# Patient Record
Sex: FEMALE | Race: WHITE | NOT HISPANIC OR LATINO | Employment: UNEMPLOYED | URBAN - NONMETROPOLITAN AREA
[De-identification: names, ages, dates, MRNs, and addresses within clinical notes are randomized per-mention and may not be internally consistent; named-entity substitution may affect disease eponyms.]

---

## 2019-02-13 ENCOUNTER — APPOINTMENT (OUTPATIENT)
Dept: CT IMAGING | Facility: HOSPITAL | Age: 49
End: 2019-02-13

## 2019-02-13 ENCOUNTER — HOSPITAL ENCOUNTER (EMERGENCY)
Facility: HOSPITAL | Age: 49
Discharge: HOME | End: 2019-02-13
Attending: EMERGENCY MEDICINE | Admitting: EMERGENCY MEDICINE

## 2019-02-13 VITALS
DIASTOLIC BLOOD PRESSURE: 84 MMHG | SYSTOLIC BLOOD PRESSURE: 176 MMHG | OXYGEN SATURATION: 95 % | TEMPERATURE: 98.1 F | HEART RATE: 109 BPM | WEIGHT: 120 LBS | RESPIRATION RATE: 18 BRPM | BODY MASS INDEX: 21.26 KG/M2 | HEIGHT: 63 IN

## 2019-02-13 DIAGNOSIS — N39.0 ACUTE UTI: Primary | ICD-10-CM

## 2019-02-13 LAB
6-ACETYL MORPHINE: NEGATIVE
ALBUMIN SERPL-MCNC: 4.2 G/DL (ref 3.5–5)
ALBUMIN/GLOB SERPL: 1.3 G/DL (ref 1.5–2.5)
ALP SERPL-CCNC: 79 U/L (ref 35–104)
ALT SERPL W P-5'-P-CCNC: 127 U/L (ref 10–36)
AMPHET+METHAMPHET UR QL: NEGATIVE
ANION GAP SERPL CALCULATED.3IONS-SCNC: 12.5 MMOL/L (ref 3.6–11.2)
AST SERPL-CCNC: 118 U/L (ref 10–30)
BACTERIA UR QL AUTO: ABNORMAL /HPF
BARBITURATES UR QL SCN: NEGATIVE
BASOPHILS # BLD AUTO: 0.04 10*3/MM3 (ref 0–0.3)
BASOPHILS NFR BLD AUTO: 0.3 % (ref 0–2)
BENZODIAZ UR QL SCN: NEGATIVE
BILIRUB SERPL-MCNC: 1.1 MG/DL (ref 0.2–1.8)
BILIRUB UR QL STRIP: NEGATIVE
BUN BLD-MCNC: 12 MG/DL (ref 7–21)
BUN/CREAT SERPL: 21.1 (ref 7–25)
BUPRENORPHINE SERPL-MCNC: NEGATIVE NG/ML
CALCIUM SPEC-SCNC: 8.9 MG/DL (ref 7.7–10)
CANNABINOIDS SERPL QL: NEGATIVE
CHLORIDE SERPL-SCNC: 106 MMOL/L (ref 99–112)
CLARITY UR: ABNORMAL
CO2 SERPL-SCNC: 19.5 MMOL/L (ref 24.3–31.9)
COCAINE UR QL: NEGATIVE
COLOR UR: ABNORMAL
CREAT BLD-MCNC: 0.57 MG/DL (ref 0.43–1.29)
DEPRECATED RDW RBC AUTO: 45.8 FL (ref 37–54)
EOSINOPHIL # BLD AUTO: 0.13 10*3/MM3 (ref 0–0.7)
EOSINOPHIL NFR BLD AUTO: 1 % (ref 0–5)
ERYTHROCYTE [DISTWIDTH] IN BLOOD BY AUTOMATED COUNT: 13.1 % (ref 11.5–14.5)
ETHANOL BLD-MCNC: 42 MG/DL
ETHANOL UR QL: 0.04 %
GFR SERPL CREATININE-BSD FRML MDRD: 113 ML/MIN/1.73
GLOBULIN UR ELPH-MCNC: 3.3 GM/DL
GLUCOSE BLD-MCNC: 99 MG/DL (ref 70–110)
GLUCOSE UR STRIP-MCNC: NEGATIVE MG/DL
HCT VFR BLD AUTO: 43.2 % (ref 37–47)
HGB BLD-MCNC: 14.7 G/DL (ref 12–16)
HGB UR QL STRIP.AUTO: ABNORMAL
HYALINE CASTS UR QL AUTO: ABNORMAL /LPF
IMM GRANULOCYTES # BLD AUTO: 0.04 10*3/MM3 (ref 0–0.03)
IMM GRANULOCYTES NFR BLD AUTO: 0.3 % (ref 0–0.5)
KETONES UR QL STRIP: NEGATIVE
LEUKOCYTE ESTERASE UR QL STRIP.AUTO: ABNORMAL
LYMPHOCYTES # BLD AUTO: 2.4 10*3/MM3 (ref 1–3)
LYMPHOCYTES NFR BLD AUTO: 18.5 % (ref 21–51)
MAGNESIUM SERPL-MCNC: 2 MG/DL (ref 1.7–2.6)
MCH RBC QN AUTO: 32.5 PG (ref 27–33)
MCHC RBC AUTO-ENTMCNC: 34 G/DL (ref 33–37)
MCV RBC AUTO: 95.6 FL (ref 80–94)
METHADONE UR QL SCN: NEGATIVE
MONOCYTES # BLD AUTO: 1.47 10*3/MM3 (ref 0.1–0.9)
MONOCYTES NFR BLD AUTO: 11.4 % (ref 0–10)
NEUTROPHILS # BLD AUTO: 8.86 10*3/MM3 (ref 1.4–6.5)
NEUTROPHILS NFR BLD AUTO: 68.5 % (ref 30–70)
NITRITE UR QL STRIP: NEGATIVE
OPIATES UR QL: NEGATIVE
OSMOLALITY SERPL CALC.SUM OF ELEC: 275.5 MOSM/KG (ref 273–305)
OXYCODONE UR QL SCN: NEGATIVE
PCP UR QL SCN: NEGATIVE
PH UR STRIP.AUTO: 7 [PH] (ref 5–8)
PLATELET # BLD AUTO: 195 10*3/MM3 (ref 130–400)
PMV BLD AUTO: 11.5 FL (ref 6–10)
POTASSIUM BLD-SCNC: 3.3 MMOL/L (ref 3.5–5.3)
PROT SERPL-MCNC: 7.5 G/DL (ref 6–8)
PROT UR QL STRIP: NEGATIVE
RBC # BLD AUTO: 4.52 10*6/MM3 (ref 4.2–5.4)
RBC # UR: ABNORMAL /HPF
REF LAB TEST METHOD: ABNORMAL
SODIUM BLD-SCNC: 138 MMOL/L (ref 135–153)
SP GR UR STRIP: 1.02 (ref 1–1.03)
SQUAMOUS #/AREA URNS HPF: ABNORMAL /HPF
UROBILINOGEN UR QL STRIP: ABNORMAL
WBC NRBC COR # BLD: 12.94 10*3/MM3 (ref 4.5–12.5)
WBC UR QL AUTO: ABNORMAL /HPF
YEAST URNS QL MICRO: ABNORMAL /HPF

## 2019-02-13 PROCEDURE — 87086 URINE CULTURE/COLONY COUNT: CPT | Performed by: EMERGENCY MEDICINE

## 2019-02-13 PROCEDURE — 85025 COMPLETE CBC W/AUTO DIFF WBC: CPT | Performed by: EMERGENCY MEDICINE

## 2019-02-13 PROCEDURE — 99284 EMERGENCY DEPT VISIT MOD MDM: CPT

## 2019-02-13 PROCEDURE — 80307 DRUG TEST PRSMV CHEM ANLYZR: CPT | Performed by: EMERGENCY MEDICINE

## 2019-02-13 PROCEDURE — 87077 CULTURE AEROBIC IDENTIFY: CPT | Performed by: EMERGENCY MEDICINE

## 2019-02-13 PROCEDURE — 93010 ELECTROCARDIOGRAM REPORT: CPT | Performed by: INTERNAL MEDICINE

## 2019-02-13 PROCEDURE — 25010000002 CEFTRIAXONE PER 250 MG: Performed by: EMERGENCY MEDICINE

## 2019-02-13 PROCEDURE — 87186 SC STD MICRODIL/AGAR DIL: CPT | Performed by: EMERGENCY MEDICINE

## 2019-02-13 PROCEDURE — 70450 CT HEAD/BRAIN W/O DYE: CPT | Performed by: RADIOLOGY

## 2019-02-13 PROCEDURE — 96372 THER/PROPH/DIAG INJ SC/IM: CPT

## 2019-02-13 PROCEDURE — 80053 COMPREHEN METABOLIC PANEL: CPT | Performed by: EMERGENCY MEDICINE

## 2019-02-13 PROCEDURE — 93005 ELECTROCARDIOGRAM TRACING: CPT | Performed by: EMERGENCY MEDICINE

## 2019-02-13 PROCEDURE — 70450 CT HEAD/BRAIN W/O DYE: CPT

## 2019-02-13 PROCEDURE — 81001 URINALYSIS AUTO W/SCOPE: CPT | Performed by: EMERGENCY MEDICINE

## 2019-02-13 PROCEDURE — 83735 ASSAY OF MAGNESIUM: CPT | Performed by: EMERGENCY MEDICINE

## 2019-02-13 RX ORDER — PHENYTOIN SODIUM 100 MG/1
CAPSULE, EXTENDED RELEASE ORAL 3 TIMES DAILY
COMMUNITY

## 2019-02-13 RX ORDER — CEFTRIAXONE 1 G/1
1000 INJECTION, POWDER, FOR SOLUTION INTRAMUSCULAR; INTRAVENOUS ONCE
Status: COMPLETED | OUTPATIENT
Start: 2019-02-13 | End: 2019-02-13

## 2019-02-13 RX ORDER — LIDOCAINE HYDROCHLORIDE 10 MG/ML
2.1 INJECTION, SOLUTION EPIDURAL; INFILTRATION; INTRACAUDAL; PERINEURAL ONCE
Status: COMPLETED | OUTPATIENT
Start: 2019-02-13 | End: 2019-02-13

## 2019-02-13 RX ORDER — CIPROFLOXACIN 250 MG/1
250 TABLET, FILM COATED ORAL 2 TIMES DAILY
Qty: 14 TABLET | Refills: 0 | OUTPATIENT
Start: 2019-02-13 | End: 2023-04-01

## 2019-02-13 RX ORDER — POTASSIUM CHLORIDE 20 MEQ/1
20 TABLET, EXTENDED RELEASE ORAL ONCE
Status: COMPLETED | OUTPATIENT
Start: 2019-02-13 | End: 2019-02-13

## 2019-02-13 RX ADMIN — LIDOCAINE HYDROCHLORIDE 2.1 ML: 10 INJECTION, SOLUTION EPIDURAL; INFILTRATION; INTRACAUDAL; PERINEURAL at 14:27

## 2019-02-13 RX ADMIN — METOPROLOL TARTRATE 25 MG: 25 TABLET, FILM COATED ORAL at 14:16

## 2019-02-13 RX ADMIN — CEFTRIAXONE SODIUM 1000 MG: 1 INJECTION, POWDER, FOR SOLUTION INTRAMUSCULAR; INTRAVENOUS at 14:27

## 2019-02-13 RX ADMIN — POTASSIUM CHLORIDE 20 MEQ: 1500 TABLET, EXTENDED RELEASE ORAL at 14:29

## 2019-02-13 NOTE — NURSING NOTE
Presented clinicals to Dr Boyle. Pt to be discharged home. Her boyfriend is here and feels comfortable taking her home. She was recommended to follow up with outpatient services and return if symptoms worsen. RBTOx2.

## 2019-02-13 NOTE — NURSING NOTE
"Intake assessment completed. Pt presented with confusion reporting she had fell or taken an unknown medication. Her only complaints to intake have been headache and that she is having trouble walking. Pt was has had disorganized speech and had been acting bizzare to ED staff. During assessment the pt answers orientation question correctly. Reports feeling \"much better than earlier\". The pt says she intends on going home after evaluation and that her boyfriend is coming to pick her up. She is NOT willing to voluntarily be admitted to the [facility]. She has denies si, hi, and avh. Denies depression, rates anxiety 10. Pt denies drug/ etoh abuse.   "

## 2019-02-13 NOTE — ED PROVIDER NOTES
Subjective   Patient presents to ER with altered        URI   Presenting symptoms: congestion, cough and fatigue    Timing:  Intermittent  Progression:  Worsening  Chronicity:  New  Relieved by:  Nothing  Worsened by:  Nothing  Ineffective treatments:  None tried  Altered Mental Status       Review of Systems   Constitutional: Positive for activity change and fatigue.   HENT: Positive for congestion.    Eyes: Negative.    Respiratory: Positive for cough.    Cardiovascular: Negative.    Gastrointestinal: Negative.    Endocrine: Negative.    Genitourinary: Negative.    Musculoskeletal: Negative.    Allergic/Immunologic: Negative.    Neurological: Negative.    Hematological: Negative.    Psychiatric/Behavioral: Positive for behavioral problems and sleep disturbance.       Past Medical History:   Diagnosis Date   • Seizures (CMS/HCC)        No Known Allergies    History reviewed. No pertinent surgical history.    History reviewed. No pertinent family history.    Social History     Socioeconomic History   • Marital status:      Spouse name: Not on file   • Number of children: Not on file   • Years of education: Not on file   • Highest education level: Not on file   Tobacco Use   • Smoking status: Current Every Day Smoker     Packs/day: 2.00     Types: Cigarettes   • Smokeless tobacco: Never Used   Substance and Sexual Activity   • Alcohol use: Yes     Comment: drinks a couple beers every other day    • Drug use: No     Comment: DENIES drug use           Objective   Physical Exam   Constitutional: She appears well-developed and well-nourished.   HENT:   Head: Normocephalic.   Eyes: Pupils are equal, round, and reactive to light.   Cardiovascular: Normal rate and regular rhythm.   Pulmonary/Chest: Effort normal. She has wheezes.   Musculoskeletal: Normal range of motion.   Neurological: She is alert.   Skin: Skin is warm.   Psychiatric: She has a normal mood and affect.   Nursing note and vitals  reviewed.      Procedures           ED Course                  MDM      Final diagnoses:   Acute UTI            Tyshawn Ngo MD  02/13/19 [id number]

## 2019-02-15 LAB — BACTERIA SPEC AEROBE CULT: ABNORMAL

## 2023-04-01 ENCOUNTER — HOSPITAL ENCOUNTER (EMERGENCY)
Facility: HOSPITAL | Age: 53
Discharge: HOME | End: 2023-04-01
Attending: STUDENT IN AN ORGANIZED HEALTH CARE EDUCATION/TRAINING PROGRAM | Admitting: STUDENT IN AN ORGANIZED HEALTH CARE EDUCATION/TRAINING PROGRAM
Payer: COMMERCIAL

## 2023-04-01 VITALS
WEIGHT: 140 LBS | TEMPERATURE: 97.1 F | DIASTOLIC BLOOD PRESSURE: 68 MMHG | HEIGHT: 63 IN | SYSTOLIC BLOOD PRESSURE: 154 MMHG | BODY MASS INDEX: 24.8 KG/M2 | HEART RATE: 78 BPM | OXYGEN SATURATION: 100 % | RESPIRATION RATE: 18 BRPM

## 2023-04-01 DIAGNOSIS — M54.50 ACUTE BILATERAL LOW BACK PAIN WITHOUT SCIATICA: Primary | ICD-10-CM

## 2023-04-01 PROCEDURE — 96374 THER/PROPH/DIAG INJ IV PUSH: CPT

## 2023-04-01 PROCEDURE — 25010000002 HYDROMORPHONE 1 MG/ML SOLUTION: Performed by: NURSE PRACTITIONER

## 2023-04-01 PROCEDURE — 96372 THER/PROPH/DIAG INJ SC/IM: CPT

## 2023-04-01 PROCEDURE — 25010000002 DEXAMETHASONE SODIUM PHOSPHATE 10 MG/ML SOLUTION: Performed by: NURSE PRACTITIONER

## 2023-04-01 PROCEDURE — 99283 EMERGENCY DEPT VISIT LOW MDM: CPT

## 2023-04-01 RX ORDER — DEXAMETHASONE SODIUM PHOSPHATE 10 MG/ML
10 INJECTION, SOLUTION INTRAMUSCULAR; INTRAVENOUS ONCE
Status: COMPLETED | OUTPATIENT
Start: 2023-04-01 | End: 2023-04-01

## 2023-04-01 RX ORDER — CYCLOBENZAPRINE HCL 10 MG
10 TABLET ORAL ONCE
Status: COMPLETED | OUTPATIENT
Start: 2023-04-01 | End: 2023-04-01

## 2023-04-01 RX ADMIN — CYCLOBENZAPRINE 10 MG: 10 TABLET, FILM COATED ORAL at 05:34

## 2023-04-01 RX ADMIN — DEXAMETHASONE SODIUM PHOSPHATE 10 MG: 10 INJECTION INTRAMUSCULAR; INTRAVENOUS at 05:34

## 2023-04-01 RX ADMIN — HYDROMORPHONE HYDROCHLORIDE 1 MG: 1 INJECTION, SOLUTION INTRAMUSCULAR; INTRAVENOUS; SUBCUTANEOUS at 04:41

## 2023-04-04 NOTE — ED PROVIDER NOTES
Subjective   History of Present Illness  Patient is a 53 year old female with past medical history significant for seizures. She reports lower back pain. She states that she has history of having back pain in the past and thinks she irritated something. She denies any other significant complaints.    History provided by:  Patient  Back Pain  Location:  Lumbar spine  Quality:  Stabbing and aching  Radiates to:  Does not radiate  Pain severity:  Moderate  Onset quality:  Gradual  Timing:  Constant  Chronicity:  New  Associated symptoms: no abdominal pain, no bladder incontinence, no bowel incontinence, no chest pain, no dysuria, no fever, no leg pain, no numbness and no paresthesias        Review of Systems   Constitutional: Negative.  Negative for fever.   HENT: Negative.    Respiratory: Negative.    Cardiovascular: Negative.  Negative for chest pain.   Gastrointestinal: Negative.  Negative for abdominal pain and bowel incontinence.   Endocrine: Negative.    Genitourinary: Negative.  Negative for bladder incontinence and dysuria.   Musculoskeletal: Positive for back pain.   Skin: Negative.    Neurological: Negative.  Negative for numbness and paresthesias.   Hematological: Negative.    Psychiatric/Behavioral: Negative.    All other systems reviewed and are negative.      Past Medical History:   Diagnosis Date   • Seizures        No Known Allergies    No past surgical history on file.    No family history on file.    Social History     Socioeconomic History   • Marital status:    Tobacco Use   • Smoking status: Every Day     Packs/day: 2.00     Types: Cigarettes   • Smokeless tobacco: Never   Substance and Sexual Activity   • Alcohol use: Yes     Comment: drinks a couple beers every other day    • Drug use: No     Comment: DENIES drug use           Objective   Physical Exam  Vitals and nursing note reviewed.   Constitutional:       General: She is not in acute distress.     Appearance: She is well-developed.  She is not diaphoretic.   HENT:      Head: Normocephalic and atraumatic.      Right Ear: External ear normal.      Left Ear: External ear normal.      Nose: Nose normal.   Eyes:      Conjunctiva/sclera: Conjunctivae normal.      Pupils: Pupils are equal, round, and reactive to light.   Neck:      Vascular: No JVD.      Trachea: No tracheal deviation.   Cardiovascular:      Rate and Rhythm: Normal rate and regular rhythm.      Heart sounds: Normal heart sounds. No murmur heard.  Pulmonary:      Effort: Pulmonary effort is normal. No respiratory distress.      Breath sounds: Normal breath sounds. No wheezing.   Abdominal:      General: Bowel sounds are normal.      Palpations: Abdomen is soft.      Tenderness: There is no abdominal tenderness.   Musculoskeletal:         General: No deformity. Normal range of motion.      Cervical back: Normal range of motion and neck supple.   Skin:     General: Skin is warm and dry.      Capillary Refill: Capillary refill takes less than 2 seconds.      Coloration: Skin is not pale.      Findings: No erythema or rash.   Neurological:      General: No focal deficit present.      Mental Status: She is alert and oriented to person, place, and time.      Cranial Nerves: No cranial nerve deficit.   Psychiatric:         Behavior: Behavior normal.         Thought Content: Thought content normal.         Procedures       ED Course                                           Medical Decision Making  Patient is a 53 year old female with past medical history significant for seizures. She reports lower back pain. She states that she has history of having back pain in the past and thinks she irritated something. She denies any other significant complaints.    Acute bilateral low back pain without sciatica: complicated acute illness or injury  Risk  Prescription drug management.          Final diagnoses:   Acute bilateral low back pain without sciatica       ED Disposition  ED Disposition     ED  Disposition   Discharge    Condition   Stable    Comment   --             Kamilah Chan, APRN  [address] [id number] [phone]    Call in 2 days           Medication List      Stop    ciprofloxacin 250 MG tablet  Commonly known as: Joanna Cheung, SANDHYA  04/04/23 1012

## 2023-05-23 ENCOUNTER — HOSPITAL ENCOUNTER (EMERGENCY)
Facility: HOSPITAL | Age: 53
Discharge: PSYCHIATRIC OR SUBSTANCE USE FACILITY | DRG: 885 | End: 2023-05-23
Attending: STUDENT IN AN ORGANIZED HEALTH CARE EDUCATION/TRAINING PROGRAM | Admitting: STUDENT IN AN ORGANIZED HEALTH CARE EDUCATION/TRAINING PROGRAM
Payer: COMMERCIAL

## 2023-05-23 ENCOUNTER — HOSPITAL ENCOUNTER (INPATIENT)
Facility: HOSPITAL | Age: 53
LOS: 8 days | Discharge: HOME | DRG: 885 | End: 2023-05-31
Attending: PSYCHIATRY & NEUROLOGY | Admitting: PSYCHIATRY & NEUROLOGY
Payer: COMMERCIAL

## 2023-05-23 VITALS
HEART RATE: 100 BPM | SYSTOLIC BLOOD PRESSURE: 145 MMHG | BODY MASS INDEX: 23.04 KG/M2 | DIASTOLIC BLOOD PRESSURE: 87 MMHG | TEMPERATURE: 97.3 F | RESPIRATION RATE: 18 BRPM | OXYGEN SATURATION: 96 % | HEIGHT: 63 IN | WEIGHT: 130 LBS

## 2023-05-23 DIAGNOSIS — R44.3 HALLUCINATIONS: Primary | ICD-10-CM

## 2023-05-23 DIAGNOSIS — F25.1 SCHIZOAFFECTIVE DISORDER, DEPRESSIVE TYPE: ICD-10-CM

## 2023-05-23 LAB
ALBUMIN SERPL-MCNC: 4.5 G/DL (ref 3.5–5.2)
ALBUMIN/GLOB SERPL: 1.3 G/DL
ALP SERPL-CCNC: 85 U/L (ref 39–117)
ALT SERPL W P-5'-P-CCNC: 21 U/L (ref 1–33)
AMPHET+METHAMPHET UR QL: NEGATIVE
AMPHETAMINES UR QL: NEGATIVE
ANION GAP SERPL CALCULATED.3IONS-SCNC: 12.3 MMOL/L (ref 5–15)
AST SERPL-CCNC: 19 U/L (ref 1–32)
BACTERIA UR QL AUTO: ABNORMAL /HPF
BARBITURATES UR QL SCN: NEGATIVE
BASOPHILS # BLD AUTO: 0.15 10*3/MM3 (ref 0–0.2)
BASOPHILS NFR BLD AUTO: 1.2 % (ref 0–1.5)
BENZODIAZ UR QL SCN: NEGATIVE
BILIRUB SERPL-MCNC: 0.3 MG/DL (ref 0–1.2)
BILIRUB UR QL STRIP: NEGATIVE
BUN SERPL-MCNC: 3 MG/DL (ref 6–20)
BUN/CREAT SERPL: 5.6 (ref 7–25)
BUPRENORPHINE SERPL-MCNC: POSITIVE NG/ML
C TRACH RRNA CVX QL NAA+PROBE: NOT DETECTED
CALCIUM SPEC-SCNC: 9.8 MG/DL (ref 8.6–10.5)
CANNABINOIDS SERPL QL: NEGATIVE
CHLORIDE SERPL-SCNC: 102 MMOL/L (ref 98–107)
CLARITY UR: ABNORMAL
CO2 SERPL-SCNC: 25.7 MMOL/L (ref 22–29)
COCAINE UR QL: NEGATIVE
COLOR UR: YELLOW
CREAT SERPL-MCNC: 0.54 MG/DL (ref 0.57–1)
DEPRECATED RDW RBC AUTO: 44.7 FL (ref 37–54)
EGFRCR SERPLBLD CKD-EPI 2021: 110.9 ML/MIN/1.73
EOSINOPHIL # BLD AUTO: 0.1 10*3/MM3 (ref 0–0.4)
EOSINOPHIL NFR BLD AUTO: 0.8 % (ref 0.3–6.2)
ERYTHROCYTE [DISTWIDTH] IN BLOOD BY AUTOMATED COUNT: 13.2 % (ref 12.3–15.4)
ETHANOL BLD-MCNC: <10 MG/DL (ref 0–10)
ETHANOL UR QL: <0.01 %
FLUAV RNA RESP QL NAA+PROBE: NOT DETECTED
FLUBV RNA ISLT QL NAA+PROBE: NOT DETECTED
GLOBULIN UR ELPH-MCNC: 3.5 GM/DL
GLUCOSE SERPL-MCNC: 126 MG/DL (ref 65–99)
GLUCOSE UR STRIP-MCNC: NEGATIVE MG/DL
HCG SERPL QL: NEGATIVE
HCT VFR BLD AUTO: 46.2 % (ref 34–46.6)
HGB BLD-MCNC: 15.2 G/DL (ref 12–15.9)
HGB UR QL STRIP.AUTO: NEGATIVE
HYALINE CASTS UR QL AUTO: ABNORMAL /LPF
IMM GRANULOCYTES # BLD AUTO: 0.04 10*3/MM3 (ref 0–0.05)
IMM GRANULOCYTES NFR BLD AUTO: 0.3 % (ref 0–0.5)
KETONES UR QL STRIP: NEGATIVE
LEUKOCYTE ESTERASE UR QL STRIP.AUTO: ABNORMAL
LYMPHOCYTES # BLD AUTO: 2.34 10*3/MM3 (ref 0.7–3.1)
LYMPHOCYTES NFR BLD AUTO: 19.5 % (ref 19.6–45.3)
MAGNESIUM SERPL-MCNC: 1.9 MG/DL (ref 1.6–2.6)
MCH RBC QN AUTO: 30.5 PG (ref 26.6–33)
MCHC RBC AUTO-ENTMCNC: 32.9 G/DL (ref 31.5–35.7)
MCV RBC AUTO: 92.8 FL (ref 79–97)
METHADONE UR QL SCN: NEGATIVE
MONOCYTES # BLD AUTO: 0.98 10*3/MM3 (ref 0.1–0.9)
MONOCYTES NFR BLD AUTO: 8.1 % (ref 5–12)
N GONORRHOEA RRNA SPEC QL NAA+PROBE: NOT DETECTED
NEUTROPHILS NFR BLD AUTO: 70.1 % (ref 42.7–76)
NEUTROPHILS NFR BLD AUTO: 8.42 10*3/MM3 (ref 1.7–7)
NITRITE UR QL STRIP: NEGATIVE
NRBC BLD AUTO-RTO: 0 /100 WBC (ref 0–0.2)
OPIATES UR QL: NEGATIVE
OXYCODONE UR QL SCN: NEGATIVE
PCP UR QL SCN: NEGATIVE
PH UR STRIP.AUTO: 7.5 [PH] (ref 5–8)
PLATELET # BLD AUTO: 329 10*3/MM3 (ref 140–450)
PMV BLD AUTO: 9.9 FL (ref 6–12)
POTASSIUM SERPL-SCNC: 3.5 MMOL/L (ref 3.5–5.2)
PROPOXYPH UR QL: NEGATIVE
PROT SERPL-MCNC: 8 G/DL (ref 6–8.5)
PROT UR QL STRIP: NEGATIVE
RBC # BLD AUTO: 4.98 10*6/MM3 (ref 3.77–5.28)
RBC # UR STRIP: ABNORMAL /HPF
REF LAB TEST METHOD: ABNORMAL
SARS-COV-2 RNA RESP QL NAA+PROBE: NOT DETECTED
SODIUM SERPL-SCNC: 140 MMOL/L (ref 136–145)
SP GR UR STRIP: 1.01 (ref 1–1.03)
SQUAMOUS #/AREA URNS HPF: ABNORMAL /HPF
TRICHOMONAS VAGINALIS PCR: NOT DETECTED
TRICYCLICS UR QL SCN: NEGATIVE
UROBILINOGEN UR QL STRIP: ABNORMAL
WBC # UR STRIP: ABNORMAL /HPF
WBC NRBC COR # BLD: 12.03 10*3/MM3 (ref 3.4–10.8)

## 2023-05-23 PROCEDURE — 87661 TRICHOMONAS VAGINALIS AMPLIF: CPT | Performed by: PSYCHIATRY & NEUROLOGY

## 2023-05-23 PROCEDURE — 87591 N.GONORRHOEAE DNA AMP PROB: CPT | Performed by: PSYCHIATRY & NEUROLOGY

## 2023-05-23 PROCEDURE — 99285 EMERGENCY DEPT VISIT HI MDM: CPT

## 2023-05-23 PROCEDURE — 81001 URINALYSIS AUTO W/SCOPE: CPT | Performed by: STUDENT IN AN ORGANIZED HEALTH CARE EDUCATION/TRAINING PROGRAM

## 2023-05-23 PROCEDURE — 87086 URINE CULTURE/COLONY COUNT: CPT | Performed by: PSYCHIATRY & NEUROLOGY

## 2023-05-23 PROCEDURE — 93005 ELECTROCARDIOGRAM TRACING: CPT | Performed by: PSYCHIATRY & NEUROLOGY

## 2023-05-23 PROCEDURE — 80053 COMPREHEN METABOLIC PANEL: CPT | Performed by: STUDENT IN AN ORGANIZED HEALTH CARE EDUCATION/TRAINING PROGRAM

## 2023-05-23 PROCEDURE — 87636 SARSCOV2 & INF A&B AMP PRB: CPT | Performed by: STUDENT IN AN ORGANIZED HEALTH CARE EDUCATION/TRAINING PROGRAM

## 2023-05-23 PROCEDURE — 80306 DRUG TEST PRSMV INSTRMNT: CPT | Performed by: STUDENT IN AN ORGANIZED HEALTH CARE EDUCATION/TRAINING PROGRAM

## 2023-05-23 PROCEDURE — 83735 ASSAY OF MAGNESIUM: CPT | Performed by: STUDENT IN AN ORGANIZED HEALTH CARE EDUCATION/TRAINING PROGRAM

## 2023-05-23 PROCEDURE — 82077 ASSAY SPEC XCP UR&BREATH IA: CPT | Performed by: STUDENT IN AN ORGANIZED HEALTH CARE EDUCATION/TRAINING PROGRAM

## 2023-05-23 PROCEDURE — 84703 CHORIONIC GONADOTROPIN ASSAY: CPT | Performed by: STUDENT IN AN ORGANIZED HEALTH CARE EDUCATION/TRAINING PROGRAM

## 2023-05-23 PROCEDURE — 87491 CHLMYD TRACH DNA AMP PROBE: CPT | Performed by: PSYCHIATRY & NEUROLOGY

## 2023-05-23 PROCEDURE — 36415 COLL VENOUS BLD VENIPUNCTURE: CPT

## 2023-05-23 PROCEDURE — 93010 ELECTROCARDIOGRAM REPORT: CPT | Performed by: INTERNAL MEDICINE

## 2023-05-23 PROCEDURE — 99223 1ST HOSP IP/OBS HIGH 75: CPT | Performed by: PSYCHIATRY & NEUROLOGY

## 2023-05-23 PROCEDURE — 85025 COMPLETE CBC W/AUTO DIFF WBC: CPT | Performed by: STUDENT IN AN ORGANIZED HEALTH CARE EDUCATION/TRAINING PROGRAM

## 2023-05-23 RX ORDER — BUPRENORPHINE HYDROCHLORIDE AND NALOXONE HYDROCHLORIDE DIHYDRATE 8; 2 MG/1; MG/1
2 TABLET SUBLINGUAL DAILY
Status: CANCELLED | OUTPATIENT
Start: 2023-05-23

## 2023-05-23 RX ORDER — NICOTINE 21 MG/24HR
1 PATCH, TRANSDERMAL 24 HOURS TRANSDERMAL
Status: DISCONTINUED | OUTPATIENT
Start: 2023-05-23 | End: 2023-05-31 | Stop reason: HOSPADM

## 2023-05-23 RX ORDER — LOPERAMIDE HYDROCHLORIDE 2 MG/1
2 CAPSULE ORAL
Status: DISCONTINUED | OUTPATIENT
Start: 2023-05-23 | End: 2023-05-31 | Stop reason: HOSPADM

## 2023-05-23 RX ORDER — TRAZODONE HYDROCHLORIDE 50 MG/1
50 TABLET ORAL NIGHTLY PRN
Status: DISCONTINUED | OUTPATIENT
Start: 2023-05-23 | End: 2023-05-31 | Stop reason: HOSPADM

## 2023-05-23 RX ORDER — HYDROXYZINE 50 MG/1
50 TABLET, FILM COATED ORAL EVERY 6 HOURS PRN
Status: DISCONTINUED | OUTPATIENT
Start: 2023-05-23 | End: 2023-05-31 | Stop reason: HOSPADM

## 2023-05-23 RX ORDER — BUPRENORPHINE HYDROCHLORIDE AND NALOXONE HYDROCHLORIDE DIHYDRATE 8; 2 MG/1; MG/1
2 TABLET SUBLINGUAL DAILY
COMMUNITY
End: 2023-06-10 | Stop reason: HOSPADM

## 2023-05-23 RX ORDER — ECHINACEA PURPUREA EXTRACT 125 MG
2 TABLET ORAL AS NEEDED
Status: DISCONTINUED | OUTPATIENT
Start: 2023-05-23 | End: 2023-05-31 | Stop reason: HOSPADM

## 2023-05-23 RX ORDER — BENZONATATE 100 MG/1
100 CAPSULE ORAL 3 TIMES DAILY PRN
Status: DISCONTINUED | OUTPATIENT
Start: 2023-05-23 | End: 2023-05-31 | Stop reason: HOSPADM

## 2023-05-23 RX ORDER — IBUPROFEN 400 MG/1
400 TABLET ORAL EVERY 6 HOURS PRN
Status: DISCONTINUED | OUTPATIENT
Start: 2023-05-23 | End: 2023-05-30

## 2023-05-23 RX ORDER — ALUMINA, MAGNESIA, AND SIMETHICONE 2400; 2400; 240 MG/30ML; MG/30ML; MG/30ML
15 SUSPENSION ORAL EVERY 6 HOURS PRN
Status: DISCONTINUED | OUTPATIENT
Start: 2023-05-23 | End: 2023-05-31 | Stop reason: HOSPADM

## 2023-05-23 RX ORDER — OLANZAPINE 5 MG/1
5 TABLET ORAL NIGHTLY
Status: DISCONTINUED | OUTPATIENT
Start: 2023-05-23 | End: 2023-05-24

## 2023-05-23 RX ORDER — BENZTROPINE MESYLATE 1 MG/ML
1 INJECTION INTRAMUSCULAR; INTRAVENOUS ONCE AS NEEDED
Status: DISCONTINUED | OUTPATIENT
Start: 2023-05-23 | End: 2023-05-31 | Stop reason: HOSPADM

## 2023-05-23 RX ORDER — ONDANSETRON 4 MG/1
4 TABLET, FILM COATED ORAL EVERY 6 HOURS PRN
Status: DISCONTINUED | OUTPATIENT
Start: 2023-05-23 | End: 2023-05-31 | Stop reason: HOSPADM

## 2023-05-23 RX ORDER — ACETAMINOPHEN 325 MG/1
650 TABLET ORAL EVERY 6 HOURS PRN
Status: DISCONTINUED | OUTPATIENT
Start: 2023-05-23 | End: 2023-05-27

## 2023-05-23 RX ORDER — FAMOTIDINE 20 MG/1
20 TABLET, FILM COATED ORAL 2 TIMES DAILY PRN
Status: DISCONTINUED | OUTPATIENT
Start: 2023-05-23 | End: 2023-05-31 | Stop reason: HOSPADM

## 2023-05-23 RX ORDER — BENZTROPINE MESYLATE 1 MG/1
2 TABLET ORAL ONCE AS NEEDED
Status: DISCONTINUED | OUTPATIENT
Start: 2023-05-23 | End: 2023-05-31 | Stop reason: HOSPADM

## 2023-05-23 RX ORDER — BUPRENORPHINE HYDROCHLORIDE AND NALOXONE HYDROCHLORIDE DIHYDRATE 8; 2 MG/1; MG/1
2 TABLET SUBLINGUAL DAILY
Status: DISCONTINUED | OUTPATIENT
Start: 2023-05-23 | End: 2023-05-31 | Stop reason: HOSPADM

## 2023-05-23 RX ADMIN — BUPRENORPHINE HYDROCHLORIDE AND NALOXONE HYDROCHLORIDE DIHYDRATE 2 TABLET: 8; 2 TABLET SUBLINGUAL at 15:57

## 2023-05-23 RX ADMIN — OLANZAPINE 5 MG: 5 TABLET, FILM COATED ORAL at 20:55

## 2023-05-23 RX ADMIN — NICOTINE TRANSDERMAL SYSTEM 1 PATCH: 21 PATCH, EXTENDED RELEASE TRANSDERMAL at 13:22
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
## 2023-05-23 NOTE — NURSING NOTE
Patient searched per policy by two staff members. Items logged and placed in intake locker. Safety Checks in place.

## 2023-05-23 NOTE — PLAN OF CARE
Problem: Adult Behavioral Health Plan of Care  Goal: Plan of Care Review  Outcome: Ongoing, Progressing  Flowsheets  Taken 5/23/2023 1226  Progress: no change  Outcome Evaluation: NEW ADMISSION  Taken 5/23/2023 1126  Plan of Care Reviewed With: patient  Patient Agreement with Plan of Care: agrees   Goal Outcome Evaluation:  Plan of Care Reviewed With: patient  Patient Agreement with Plan of Care: agrees     Progress: no change  Outcome Evaluation: NEW ADMISSION
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
## 2023-05-24 LAB
BACTERIA SPEC AEROBE CULT: NORMAL
QT INTERVAL: 362 MS
QTC INTERVAL: 415 MS

## 2023-05-24 RX ORDER — OLANZAPINE 10 MG/1
10 TABLET ORAL NIGHTLY
Status: DISCONTINUED | OUTPATIENT
Start: 2023-05-24 | End: 2023-05-31 | Stop reason: HOSPADM

## 2023-05-24 RX ADMIN — TRAZODONE HYDROCHLORIDE 50 MG: 50 TABLET ORAL at 20:52

## 2023-05-24 RX ADMIN — OLANZAPINE 10 MG: 10 TABLET, FILM COATED ORAL at 20:52

## 2023-05-24 RX ADMIN — HYDROXYZINE HYDROCHLORIDE 50 MG: 50 TABLET ORAL at 20:53

## 2023-05-24 RX ADMIN — BUPRENORPHINE HYDROCHLORIDE AND NALOXONE HYDROCHLORIDE DIHYDRATE 2 TABLET: 8; 2 TABLET SUBLINGUAL at 08:32

## 2023-05-24 RX ADMIN — NICOTINE TRANSDERMAL SYSTEM 1 PATCH: 21 PATCH, EXTENDED RELEASE TRANSDERMAL at 08:32

## 2023-05-24 RX ADMIN — HYDROXYZINE HYDROCHLORIDE 50 MG: 50 TABLET ORAL at 15:52

## 2023-05-24 NOTE — PLAN OF CARE
Goal Outcome Evaluation:  Plan of Care Reviewed With: patient  Patient Agreement with Plan of Care: agrees     Progress: no change  Outcome Evaluation: Pt calm and cooperative this shift. Pt is getting along well with her roommate and they have been walking the mallory and laughing and had no issues or concerns.

## 2023-05-24 NOTE — PROGRESS NOTES
"INPATIENT PSYCHIATRIC PROGRESS NOTE    Name:  Anna Moore  :  1970  MRN:  [id number]  Visit Number:  [id number]  Length of stay:  1    SUBJECTIVE    CC/Focus of Exam: Psychosis    INTERVAL HISTORY:  Patient continues to experience ongoing paranoia of people trying to kill her, auditory hallucinations of these individuals conspiring to get her and waiting in the next room for her.  Elevated anxiety, insomnia, nightmares, increased agitation and psychomotor activity.    Depression rating 5/10  Anxiety rating 9/10  Sleep: Poor  Withdrawal sx: Denied  Cravin/10    Review of Systems   Constitutional: Negative.    Respiratory: Negative.    Cardiovascular: Negative.    Gastrointestinal: Negative.    Musculoskeletal: Negative.    Psychiatric/Behavioral: Positive for dysphoric mood, hallucinations and sleep disturbance. The patient is nervous/anxious and is hyperactive.        OBJECTIVE    Temp:  [97.3 °F (36.3 °C)-98.1 °F (36.7 °C)] 98.1 °F (36.7 °C)  Heart Rate:  [] 90  Resp:  [16-18] 18  BP: (105-149)/(71-90) 129/90    MENTAL STATUS EXAM:  Appearance: Casually dressed, fair hygeine.   Cooperation: Cooperative  Psychomotor: Increased psychomotor agitation/retardation, No EPS, No motor tics  Speech: normal rate, amount.  Mood: \"Worried\"   Affect: congruent, anxious, elevated  Thought Content: goal directed, significant delusional material present  Thought process: linear, organized.  Suicidality: No SI  Homicidality: No HI  Perception: + AH/VH  Insight: Poor  Judgment: fair    Lab Results (last 24 hours)     Procedure Component Value Units Date/Time    Urine Culture - Urine, Urine, Clean Catch [[id number]] Collected: 23 0930    Specimen: Urine, Clean Catch Updated: 23 1143     Urine Culture 50,000 CFU/mL Mixed Yvonne Isolated    Narrative:      Specimen contains mixed organisms of questionable pathogenicity suggestive of contamination. If symptoms persist, suggest recollection.  Colonization of " the urinary tract without infection is common. Treatment is discouraged unless the patient is symptomatic, pregnant, or undergoing an invasive urologic procedure.    Chlamydia trachomatis, Neisseria gonorrhoeae, Trichomonas vaginalis, PCR - Urine, Urine, Clean Catch [[id number]]  (Normal) Collected: 05/23/23 0930    Specimen: Urine, Clean Catch Updated: 05/23/23 1759     Chlamydia DNA by PCR Not Detected     Neisseria gonorrhoeae by PCR Not Detected     Trichomonas vaginalis PCR Not Detected             Imaging Results (Last 24 Hours)     ** No results found for the last 24 hours. **             ECG/EMG Results (most recent)     Procedure Component Value Units Date/Time    ECG 12 Lead Other [[id number]] Collected: 05/23/23 1456     Updated: 05/23/23 1458     QT Interval 362 ms      QTC Interval 415 ms     Narrative:      Test Reason : Baseline Cardiac Status~  Blood Pressure :   */*   mmHG  Vent. Rate :  79 BPM     Atrial Rate :  79 BPM     P-R Int : 122 ms          QRS Dur :  88 ms      QT Int : 362 ms       P-R-T Axes :  62  38  49 degrees     QTc Int : 415 ms    Normal sinus rhythm  Normal ECG  When compared with ECG of 13-FEB-2019 12:32,  No significant change was found    Referred By:            Confirmed By:            ALLERGIES: Patient has no known allergies.      Current Facility-Administered Medications:   •  acetaminophen (TYLENOL) tablet 650 mg, 650 mg, Oral, Q6H PRN, Tee Potter MD  •  aluminum-magnesium hydroxide-simethicone (MAALOX MAX) 400-400-40 MG/5ML suspension 15 mL, 15 mL, Oral, Q6H PRN, Tee Potter MD  •  benzonatate (TESSALON) capsule 100 mg, 100 mg, Oral, TID PRN, Tee Potter MD  •  benztropine (COGENTIN) tablet 2 mg, 2 mg, Oral, Once PRN **OR** benztropine (COGENTIN) injection 1 mg, 1 mg, Intramuscular, Once PRN, Tee Potter MD  •  buprenorphine-naloxone (SUBOXONE) 8-2 MG per SL tablet 2 tablet, 2 tablet, Sublingual, Daily, Tee oPtter MD, 2 tablet at 05/24/23  0832  •  famotidine (PEPCID) tablet 20 mg, 20 mg, Oral, BID PRN, Tee Potter MD  •  hydrOXYzine (ATARAX) tablet 50 mg, 50 mg, Oral, Q6H PRN, Tee Potter MD  •  ibuprofen (ADVIL,MOTRIN) tablet 400 mg, 400 mg, Oral, Q6H PRN, Tee Potter MD  •  loperamide (IMODIUM) capsule 2 mg, 2 mg, Oral, Q2H PRN, Tee Potter MD  •  magnesium hydroxide (MILK OF MAGNESIA) suspension 10 mL, 10 mL, Oral, Daily PRN, Tee Potter MD  •  nicotine (NICODERM CQ) 21 MG/24HR patch 1 patch, 1 patch, Transdermal, Q24H, Tee Potter MD, 1 patch at 05/24/23 0832  •  OLANZapine (zyPREXA) tablet 5 mg, 5 mg, Oral, Nightly, Tee Potter MD, 5 mg at 05/23/23 2055  •  ondansetron (ZOFRAN) tablet 4 mg, 4 mg, Oral, Q6H PRN, Tee Potter MD  •  sodium chloride nasal spray 2 spray, 2 spray, Each Nare, PRN, Tee Potter MD  •  traZODone (DESYREL) tablet 50 mg, 50 mg, Oral, Nightly PRN, Tee Potter MD    Reviewed chart, notes, vitals, labs and EKG personally reviewed.    ASSESSMENT & PLAN:    Unspecified psychotic disorder  -Rule out mood disorder, psychotic disorder, substance-induced disorder  -Increase olanzapine to 10 mg nightly  -We will establish outpatient psychiatric care following hospitalization     Opioid use disorder, severe, dependence, on maintenance medication  -Admission UDS positive for buprenorphine  -Continue Suboxone 16 mg daily  -We will refer for substance abuse treatment following hospitalization     Urinary tract infection  -UA with 3-5 RBC, many WBC, negative nitrite  -Urine culture no growth 24 hours  -STI testing negative     The patient has been admitted for safety and stabilization.  Patient will be monitored for suicidality daily and maintained on Special Precautions Level 3 (q15 min checks) .  The patient will have individual and group therapy with a master's level therapist. A master treatment plan will be developed and agreed upon by the patient and his/her treatment team.  The  patient's estimated length of stay in the hospital is 5-7 days.       Special precautions: Special Precautions Level 3 (q15 min checks)     Behavioral Health Treatment Plan and Problem List: I have reviewed and approved the Behavioral Health Treatment Plan and Problem list.  The patient has had a chance to review and agrees with the treatment plan.     Clinician:  Tee Potter MD  05/24/23  12:50 EDT

## 2023-05-24 NOTE — DISCHARGE INSTR - APPOINTMENTS
[facility]  [address] [place] [id number]  [phone]    You have an appointment on June 6 2023 at 9:00am.
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
## 2023-05-24 NOTE — PLAN OF CARE
Problem: Adult Behavioral Health Plan of Care  Goal: Plan of Care Review  Outcome: Ongoing, Progressing  Flowsheets  Taken 5/24/2023 1324 by Stanton Nicholson, RN  Outcome Evaluation: PATIENT VERBALIZES POOR SLEEP, SEVERE ANXIETY AND DEPRESSION, PARANOIA AND AUDITORY HALLUCINATIONS. PATIENT IS AOX3, COOPERATIVE WITH NO S/SOF ACUTE DISTRESS NOTED. NEW ORDERS: ZYPREXA 10MG  Taken 5/24/2023 0719 by Stanton Nicholson, RN  Plan of Care Reviewed With: patient  Patient Agreement with Plan of Care: agrees  Taken 5/23/2023 2256 by Melissa Adrian RN  Progress: no change   Goal Outcome Evaluation:  Plan of Care Reviewed With: patient  Patient Agreement with Plan of Care: agrees     Progress: no change  Outcome Evaluation: PATIENT VERBALIZES POOR SLEEP, SEVERE ANXIETY AND DEPRESSION, PARANOIA AND AUDITORY HALLUCINATIONS. PATIENT IS AOX3, COOPERATIVE WITH NO S/SOF ACUTE DISTRESS NOTED. NEW ORDERS: ZYPREXA 10MG

## 2023-05-25 PROCEDURE — 99232 SBSQ HOSP IP/OBS MODERATE 35: CPT | Performed by: PSYCHIATRY & NEUROLOGY

## 2023-05-25 RX ADMIN — BUPRENORPHINE HYDROCHLORIDE AND NALOXONE HYDROCHLORIDE DIHYDRATE 2 TABLET: 8; 2 TABLET SUBLINGUAL at 08:14

## 2023-05-25 RX ADMIN — TRAZODONE HYDROCHLORIDE 50 MG: 50 TABLET ORAL at 20:37

## 2023-05-25 RX ADMIN — OLANZAPINE 10 MG: 10 TABLET, FILM COATED ORAL at 20:37

## 2023-05-25 RX ADMIN — MAGNESIUM HYDROXIDE 10 ML: 2400 SUSPENSION ORAL at 09:50

## 2023-05-25 RX ADMIN — HYDROXYZINE HYDROCHLORIDE 50 MG: 50 TABLET ORAL at 11:56

## 2023-05-25 RX ADMIN — HYDROXYZINE HYDROCHLORIDE 50 MG: 50 TABLET ORAL at 20:37

## 2023-05-25 RX ADMIN — NICOTINE TRANSDERMAL SYSTEM 1 PATCH: 21 PATCH, EXTENDED RELEASE TRANSDERMAL at 08:14

## 2023-05-25 NOTE — PLAN OF CARE
Goal Outcome Evaluation:  Plan of Care Reviewed With: patient  Patient Agreement with Plan of Care: agrees               Pt states anxiety 7. Pt is calm and cooperative with staff, med compliant

## 2023-05-25 NOTE — PROGRESS NOTES
"INPATIENT PSYCHIATRIC PROGRESS NOTE    Name:  Anna Moore  :  1970  MRN:  [id number]  Visit Number:  [id number]  Length of stay:  2    SUBJECTIVE    CC/Focus of Exam: Psychosis    INTERVAL HISTORY:  Patient with some improvement of psychotic and manic symptoms.  Patient with appropriate energy and activity today, more normal speech, more organized and reporting delusions are improving.  She does appear troubled by these delusions and anxious about how they occurred.  She denies any similar history of these symptoms, but does report significantly poor sleep prior to hospitalization.  Slept much better last night and feels she is improving overall.    Depression rating 2/10  Anxiety rating 6/10  Sleep: Better  Withdrawal sx: Denied  Cravin/10    Review of Systems   Constitutional: Negative.    Respiratory: Negative.    Cardiovascular: Negative.    Gastrointestinal: Negative.    Musculoskeletal: Negative.    Psychiatric/Behavioral: Positive for hallucinations. The patient is nervous/anxious.        OBJECTIVE    Temp:  [97.8 °F (36.6 °C)-98.4 °F (36.9 °C)] 97.8 °F (36.6 °C)  Heart Rate:  [80-89] 89  Resp:  [18] 18  BP: (163)/(83-87) 163/87    MENTAL STATUS EXAM:  Appearance: Casually dressed, fair hygeine.   Cooperation: Cooperative  Psychomotor: No psychomotor agitation/retardation, No EPS, No motor tics  Speech: normal rate, amount.  Mood: \"Better but anxious\"   Affect: congruent, appropriate  Thought Content: goal directed, delusional material improving  Thought process: linear, organized.  Suicidality: No SI  Homicidality: No HI  Perception: Denies AH/VH  Insight: Fair  Judgment: fair    Lab Results (last 24 hours)     ** No results found for the last 24 hours. **             Imaging Results (Last 24 Hours)     ** No results found for the last 24 hours. **             ECG/EMG Results (most recent)     Procedure Component Value Units Date/Time    ECG 12 Lead Other [[id number]] Collected: 23 6996     " Updated: 05/24/23 1756     QT Interval 362 ms      QTC Interval 415 ms     Narrative:      Test Reason : Baseline Cardiac Status~  Blood Pressure :   */*   mmHG  Vent. Rate :  79 BPM     Atrial Rate :  79 BPM     P-R Int : 122 ms          QRS Dur :  88 ms      QT Int : 362 ms       P-R-T Axes :  62  38  49 degrees     QTc Int : 415 ms    Normal sinus rhythm  Normal ECG  When compared with ECG of 13-FEB-2019 12:32,  No significant change was found  Confirmed by Simeon Gutierrez ([id number]) on 5/24/2023 5:54:31 PM    Referred By:            Confirmed By: Simeon Gutierrez           ALLERGIES: Patient has no known allergies.      Current Facility-Administered Medications:   •  acetaminophen (TYLENOL) tablet 650 mg, 650 mg, Oral, Q6H PRN, Tee Potter MD  •  aluminum-magnesium hydroxide-simethicone (MAALOX MAX) 400-400-40 MG/5ML suspension 15 mL, 15 mL, Oral, Q6H PRN, Tee Potter MD  •  benzonatate (TESSALON) capsule 100 mg, 100 mg, Oral, TID PRN, Tee Potter MD  •  benztropine (COGENTIN) tablet 2 mg, 2 mg, Oral, Once PRN **OR** benztropine (COGENTIN) injection 1 mg, 1 mg, Intramuscular, Once PRN, Tee Potter MD  •  buprenorphine-naloxone (SUBOXONE) 8-2 MG per SL tablet 2 tablet, 2 tablet, Sublingual, Daily, Tee Potter MD, 2 tablet at 05/25/23 0814  •  famotidine (PEPCID) tablet 20 mg, 20 mg, Oral, BID PRN, Tee Potter MD  •  hydrOXYzine (ATARAX) tablet 50 mg, 50 mg, Oral, Q6H PRN, Tee Potter MD, 50 mg at 05/25/23 1156  •  ibuprofen (ADVIL,MOTRIN) tablet 400 mg, 400 mg, Oral, Q6H PRN, Tee Potter MD  •  loperamide (IMODIUM) capsule 2 mg, 2 mg, Oral, Q2H PRN, Tee Potter MD  •  magnesium hydroxide (MILK OF MAGNESIA) suspension 10 mL, 10 mL, Oral, Daily PRN, Tee Potter MD, 10 mL at 05/25/23 0950  •  nicotine (NICODERM CQ) 21 MG/24HR patch 1 patch, 1 patch, Transdermal, Q24H, Tee Potter MD, 1 patch at 05/25/23 0814  •  OLANZapine (zyPREXA) tablet 10 mg, 10 mg, Oral,  Nightly, Tee Potter MD, 10 mg at 05/24/23 2052  •  ondansetron (ZOFRAN) tablet 4 mg, 4 mg, Oral, Q6H PRN, Tee Potter MD  •  sodium chloride nasal spray 2 spray, 2 spray, Each Nare, PRN, Tee Potter MD  •  traZODone (DESYREL) tablet 50 mg, 50 mg, Oral, Nightly PRN, Tee Potter MD, 50 mg at 05/24/23 2052    Reviewed chart, notes, vitals, labs and EKG personally reviewed.    ASSESSMENT & PLAN:    Unspecified psychotic disorder  -Rule out mood disorder, psychotic disorder, substance-induced disorder  -Increased olanzapine to 10 mg nightly on 5/24/2023  -We will establish outpatient psychiatric care following hospitalization     Opioid use disorder, severe, dependence, on maintenance medication  -Admission UDS positive for buprenorphine  -Continue Suboxone 16 mg daily  -We will refer for substance abuse treatment following hospitalization     Urinary tract infection  -UA with 3-5 RBC, many WBC, negative nitrite  -Urine culture no growth 48 hours  -STI testing negative     The patient has been admitted for safety and stabilization.  Patient will be monitored for suicidality daily and maintained on Special Precautions Level 3 (q15 min checks) .  The patient will have individual and group therapy with a master's level therapist. A master treatment plan will be developed and agreed upon by the patient and his/her treatment team.  The patient's estimated length of stay in the hospital is 1-3 days.       Special precautions: Special Precautions Level 3 (q15 min checks)     Behavioral Health Treatment Plan and Problem List: I have reviewed and approved the Behavioral Health Treatment Plan and Problem list.  The patient has had a chance to review and agrees with the treatment plan.     Clinician:  Tee Potter MD  05/25/23  12:42 EDT

## 2023-05-25 NOTE — PLAN OF CARE
DATA:      Therapist discussed case with Dr. Potter and met with patient today to review coping skills, review plan of care, and discuss discharge.    Therapist obtained consent for patient's son, Luigi. Therapist attempted to contact Luigi and Jurgen today; no answer or option to leave voicemail.     1420: Therapist contacted Luigi at [phone]. He reports that the patient can come to his home at discharge. Luigi reports that the patient began having symptoms of mild paranoia around 2 years ago, but her symptoms increased significantly over the past 2 weeks without any known contributing factors. Therapist provided Luigi with psychoeducation regarding patient's symptoms and how he can be supportive. He was receptive. Therapist advised Luigi to secure any firearms/weapons, sharp objects, or medications in the home. He confirms there are no firearms in the home and he will secure other potentially harmful objects prior to discharge. Luigi is agreeable with patient potentially discharging tomorrow and denies any concerns. He will pick her up when she's ready.      Clinical Maneuvering/Intervention:     Therapist assisted patient in processing above session content; acknowledged and normalized patient’s thoughts, feelings, and concerns.  Discussed the therapist/patient relationship and explain the parameters and limitations of relative confidentiality.  Also discussed the importance of active participation, and honesty to the treatment process.  Encouraged the patient to discuss/vent their feelings, frustrations, and fears concerning their ongoing medical issues and validated their feelings.     Allowed patient to freely discuss issues without interruption or judgment. Provided safe, confidential environment to facilitate the development of positive therapeutic relationship and encourage open, honest communication.      Therapist addressed discharge safety planning this date. Assisted patient in identifying risk factors  which would indicate the need for higher level of care after discharge;  including thoughts to harm self or others and/or self-harming behavior. Encouraged patient to call 911, or present to the nearest emergency room should any of these events occur. Discussed crisis intervention services and means to access.  Encouraged securing any objects of harm.    Therapist completed integrated summary, treatment plan, and initiated social history this date.  Therapist is strongly encouraging family involvement in treatment.       Encouraged mask wearing, social distancing, and regular hand washing due to COVID19 risk.      ASSESSMENT:      Therapist met 1:1 with patient today. Patient denies suicidal ideation. Patient denies homicidal ideation. Patient denies AVH. Patient presenting with improvements today and does report feeling better. Patient's affect is appropriate and she reports anxiety has improved. Patient reports sleep is improved also and that she had been sleeping poorly prior to hospitalization. Patient appears to be wavering somewhat with her delusional thinking related to her brother and his girlfriend. Patient is much less distressed about the situation and reports she thinks she would like to stay with her son for a while after discharge. Patient reports she thinks it would help her to get out of her home for a bit.      PLAN:       Patient to remain hospitalized this date.      Treatment team will focus efforts on stabilizing patient's acute symptoms while providing education on healthy coping and crisis management to reduce hospitalizations.   Patient requires daily psychiatrist evaluation and 24/7 nursing supervision to promote patient  safety.     Therapist will offer 1-4 individual sessions, 1 therapy group daily, family education, and appropriate referral.

## 2023-05-25 NOTE — PLAN OF CARE
Goal Outcome Evaluation:  Plan of Care Reviewed With: patient  Patient Agreement with Plan of Care: agrees        Outcome Evaluation: Patient rates anxiety 5 depression 0. Patient is calm and cooperative with staff and other patient's. Pt denies any SI/HI/AVH. Pt has no complaints on this shift.

## 2023-05-26 RX ORDER — CETIRIZINE HYDROCHLORIDE 10 MG/1
10 TABLET ORAL DAILY
Status: DISCONTINUED | OUTPATIENT
Start: 2023-05-26 | End: 2023-05-31 | Stop reason: HOSPADM

## 2023-05-26 RX ORDER — FLUOXETINE HYDROCHLORIDE 20 MG/1
20 CAPSULE ORAL DAILY
Status: DISCONTINUED | OUTPATIENT
Start: 2023-05-26 | End: 2023-05-31 | Stop reason: HOSPADM

## 2023-05-26 RX ADMIN — TRAZODONE HYDROCHLORIDE 50 MG: 50 TABLET ORAL at 21:03

## 2023-05-26 RX ADMIN — ACETAMINOPHEN 650 MG: 325 TABLET ORAL at 22:33

## 2023-05-26 RX ADMIN — FLUOXETINE HYDROCHLORIDE 20 MG: 20 CAPSULE ORAL at 09:46

## 2023-05-26 RX ADMIN — HYDROXYZINE HYDROCHLORIDE 50 MG: 50 TABLET ORAL at 09:46

## 2023-05-26 RX ADMIN — HYDROXYZINE HYDROCHLORIDE 50 MG: 50 TABLET ORAL at 22:33

## 2023-05-26 RX ADMIN — HYDROXYZINE HYDROCHLORIDE 50 MG: 50 TABLET ORAL at 16:52

## 2023-05-26 RX ADMIN — CETIRIZINE HYDROCHLORIDE 10 MG: 10 TABLET, FILM COATED ORAL at 09:46

## 2023-05-26 RX ADMIN — BUPRENORPHINE HYDROCHLORIDE AND NALOXONE HYDROCHLORIDE DIHYDRATE 2 TABLET: 8; 2 TABLET SUBLINGUAL at 09:25

## 2023-05-26 RX ADMIN — ACETAMINOPHEN 650 MG: 325 TABLET ORAL at 11:25

## 2023-05-26 RX ADMIN — NICOTINE TRANSDERMAL SYSTEM 1 PATCH: 21 PATCH, EXTENDED RELEASE TRANSDERMAL at 09:25

## 2023-05-26 RX ADMIN — OLANZAPINE 10 MG: 10 TABLET, FILM COATED ORAL at 21:03
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
## 2023-05-26 NOTE — PROGRESS NOTES
"INPATIENT PSYCHIATRIC PROGRESS NOTE    Name:  Anna Moore  :  1970  MRN:  [id number]  Visit Number:  [id number]  Length of stay:  3    SUBJECTIVE    CC/Focus of Exam: Psychosis    INTERVAL HISTORY:  Patient with some setback with psychotic symptoms.  She appears anxious, pressured, increased psychomotor activity. She reports AH of a young girl crying overnight. She appears anxious, on edge today. She continues to sleep better.    Depression rating 3/10  Anxiety rating 8/10  Sleep: Better, 9-10h last night  Withdrawal sx: Denied  Cravin/10    Review of Systems   Constitutional: Negative.    Respiratory: Negative.    Cardiovascular: Negative.    Gastrointestinal: Negative.    Musculoskeletal: Negative.    Psychiatric/Behavioral: Positive for hallucinations. The patient is nervous/anxious.        OBJECTIVE    Temp:  [97.6 °F (36.4 °C)-98.5 °F (36.9 °C)] 97.6 °F (36.4 °C)  Heart Rate:  [75-86] 86  Resp:  [18] 18  BP: (148-189)/(88-96) 160/94    MENTAL STATUS EXAM:  Appearance: Casually dressed, fair hygeine.   Cooperation: Cooperative  Psychomotor: No psychomotor agitation/retardation, No EPS, No motor tics  Speech: normal rate, amount.  Mood: \"anxious\"   Affect: congruent, restricted  Thought Content: goal directed, delusional material improving  Thought process: linear, organized.  Suicidality: No SI  Homicidality: No HI  Perception: + AH/VH  Insight: Fair  Judgment: fair    Lab Results (last 24 hours)     ** No results found for the last 24 hours. **             Imaging Results (Last 24 Hours)     ** No results found for the last 24 hours. **             ECG/EMG Results (most recent)     Procedure Component Value Units Date/Time    ECG 12 Lead Other [[id number]] Collected: 23 1456     Updated: 23 1756     QT Interval 362 ms      QTC Interval 415 ms     Narrative:      Test Reason : Baseline Cardiac Status~  Blood Pressure :   */*   mmHG  Vent. Rate :  79 BPM     Atrial Rate :  79 BPM     P-R " Int : 122 ms          QRS Dur :  88 ms      QT Int : 362 ms       P-R-T Axes :  62  38  49 degrees     QTc Int : 415 ms    Normal sinus rhythm  Normal ECG  When compared with ECG of 13-FEB-2019 12:32,  No significant change was found  Confirmed by Simeon Gutierrez ([id number]) on 5/24/2023 5:54:31 PM    Referred By:            Confirmed By: Simeon Gutierrez           ALLERGIES: Patient has no known allergies.      Current Facility-Administered Medications:   •  acetaminophen (TYLENOL) tablet 650 mg, 650 mg, Oral, Q6H PRN, Tee Potter MD  •  aluminum-magnesium hydroxide-simethicone (MAALOX MAX) 400-400-40 MG/5ML suspension 15 mL, 15 mL, Oral, Q6H PRN, Tee Potter MD  •  benzonatate (TESSALON) capsule 100 mg, 100 mg, Oral, TID PRN, Tee Potter MD  •  benztropine (COGENTIN) tablet 2 mg, 2 mg, Oral, Once PRN **OR** benztropine (COGENTIN) injection 1 mg, 1 mg, Intramuscular, Once PRN, Tee Potter MD  •  buprenorphine-naloxone (SUBOXONE) 8-2 MG per SL tablet 2 tablet, 2 tablet, Sublingual, Daily, Tee Potter MD, 2 tablet at 05/26/23 0925  •  cetirizine (zyrTEC) tablet 10 mg, 10 mg, Oral, Daily, Tee Potter MD  •  famotidine (PEPCID) tablet 20 mg, 20 mg, Oral, BID PRN, Tee Potter MD  •  FLUoxetine (PROzac) capsule 20 mg, 20 mg, Oral, Daily, Tee Potter MD  •  hydrOXYzine (ATARAX) tablet 50 mg, 50 mg, Oral, Q6H PRN, Tee Potter MD, 50 mg at 05/25/23 2037  •  ibuprofen (ADVIL,MOTRIN) tablet 400 mg, 400 mg, Oral, Q6H PRN, Tee Potter MD  •  loperamide (IMODIUM) capsule 2 mg, 2 mg, Oral, Q2H PRN, Tee Potter MD  •  magnesium hydroxide (MILK OF MAGNESIA) suspension 10 mL, 10 mL, Oral, Daily PRN, Tee Potter MD, 10 mL at 05/25/23 0950  •  nicotine (NICODERM CQ) 21 MG/24HR patch 1 patch, 1 patch, Transdermal, Q24H, Tee Potter MD, 1 patch at 05/26/23 0925  •  OLANZapine (zyPREXA) tablet 10 mg, 10 mg, Oral, Nightly, Tee Potter MD, 10 mg at 05/25/23 2037  •   ondansetron (ZOFRAN) tablet 4 mg, 4 mg, Oral, Q6H PRN, Tee Potter MD  •  sodium chloride nasal spray 2 spray, 2 spray, Each Nare, PRN, Tee Potter MD  •  traZODone (DESYREL) tablet 50 mg, 50 mg, Oral, Nightly PRN, Tee Potter MD, 50 mg at 05/25/23 2037    Reviewed chart, notes, vitals, labs and EKG personally reviewed.    ASSESSMENT & PLAN:    Unspecified psychotic disorder  -R/O bipolar d/o, psychotic d/o  -Increased olanzapine to 10 mg nightly on 5/24/2023  -We will establish outpatient psychiatric care following hospitalization     Post Traumatic Stress Disorder - new  -Meds as above  -Begin fluoxetine 20mg daily  -OP care as above. Refer for trauma focused therapy    Opioid use disorder, severe, dependence, on maintenance medication  -Admission UDS positive for buprenorphine  -Continue Suboxone 16 mg daily  -We will refer for substance abuse treatment following hospitalization     Urinary tract infection  -UA with 3-5 RBC, many WBC, negative nitrite  -Urine culture no growth 72 hours  -STI testing negative     The patient has been admitted for safety and stabilization.  Patient will be monitored for suicidality daily and maintained on Special Precautions Level 3 (q15 min checks) .  The patient will have individual and group therapy with a master's level therapist. A master treatment plan will be developed and agreed upon by the patient and his/her treatment team.  The patient's estimated length of stay in the hospital is 2-3 days.       Special precautions: Special Precautions Level 3 (q15 min checks)     Behavioral Health Treatment Plan and Problem List: I have reviewed and approved the Behavioral Health Treatment Plan and Problem list.  The patient has had a chance to review and agrees with the treatment plan.     Clinician:  Tee Potter MD  05/26/23  09:43 EDT

## 2023-05-26 NOTE — PLAN OF CARE
Goal Outcome Evaluation:  Plan of Care Reviewed With: patient  Patient Agreement with Plan of Care: agrees        Outcome Evaluation: Patient  calm and cooperative. Denied JAYY, HI, YING.
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
## 2023-05-27 RX ADMIN — IBUPROFEN 400 MG: 400 TABLET, FILM COATED ORAL at 21:38

## 2023-05-27 RX ADMIN — BUPRENORPHINE HYDROCHLORIDE AND NALOXONE HYDROCHLORIDE DIHYDRATE 2 TABLET: 8; 2 TABLET SUBLINGUAL at 08:17

## 2023-05-27 RX ADMIN — ACETAMINOPHEN 650 MG: 325 TABLET ORAL at 07:35

## 2023-05-27 RX ADMIN — FLUOXETINE HYDROCHLORIDE 20 MG: 20 CAPSULE ORAL at 08:17

## 2023-05-27 RX ADMIN — HYDROXYZINE HYDROCHLORIDE 50 MG: 50 TABLET ORAL at 14:20

## 2023-05-27 RX ADMIN — TRAZODONE HYDROCHLORIDE 50 MG: 50 TABLET ORAL at 20:33

## 2023-05-27 RX ADMIN — IBUPROFEN 400 MG: 400 TABLET, FILM COATED ORAL at 14:19

## 2023-05-27 RX ADMIN — HYDROXYZINE HYDROCHLORIDE 50 MG: 50 TABLET ORAL at 21:31

## 2023-05-27 RX ADMIN — HYDROXYZINE HYDROCHLORIDE 50 MG: 50 TABLET ORAL at 08:18

## 2023-05-27 RX ADMIN — NICOTINE TRANSDERMAL SYSTEM 1 PATCH: 21 PATCH, EXTENDED RELEASE TRANSDERMAL at 08:17

## 2023-05-27 RX ADMIN — CETIRIZINE HYDROCHLORIDE 10 MG: 10 TABLET, FILM COATED ORAL at 08:17

## 2023-05-27 RX ADMIN — OLANZAPINE 10 MG: 10 TABLET, FILM COATED ORAL at 20:33

## 2023-05-27 NOTE — PLAN OF CARE
Goal Outcome Evaluation:  Plan of Care Reviewed With: patient  Patient Agreement with Plan of Care: agrees        Outcome Evaluation: Patient calm and cooperative, approriate with others. Denied SI, HI, YING.

## 2023-05-27 NOTE — PLAN OF CARE
Goal Outcome Evaluation:              Outcome Evaluation: Patient has spent time out of room, interacting with peers appropriately. She denies SI/HI and A/V hallucinations

## 2023-05-27 NOTE — PROGRESS NOTES
" Inpatient Psych Progress Note     Clinician: Trevor Juarez MD  Admission Date: 5/23/2023  09:58 EDT 05/27/23    Behavioral Health Treatment Plan and Problem List: I have reviewed and approved the Behavioral Health Treatment Plan and Problem list.    Allergies  No Known Allergies    Hospital Day: 4 days      Assessment completed within view of staff    History  CC/clinical focus: psychosis    Interval HPI: Patient seen and evaluated by me.  Chart reviewed. Slept better last night, but not optimal. Anxious, a bit pressured. Denies AH this morning.   Med Compliant.  ROS otherwise as below.      Interval Review of Systems:   General ROS: negative for - fever or malaise  Endocrine ROS: negative for - palpitations  Respiratory ROS: no cough, shortness of breath, or wheezing  Cardiovascular ROS: no chest pain or dyspnea on exertion  Gastrointestinal ROS: no abdominal pain,no black or bloody stools    /83 (BP Location: Right arm, Patient Position: Sitting)   Pulse 98   Temp 97.4 °F (36.3 °C) (Temporal)   Resp 18   Ht 160 cm (63\")   Wt 59.2 kg (130 lb 9.6 oz)   LMP 05/21/2023 (Approximate)   SpO2 97%   BMI 23.13 kg/m²     Mental Status Exam  Mood: anxious  Affect: dysphoric   Thought Processes: linear  Thought Content: negativistic  Hallucinations: no  Suicidal Thoughts: denies  Suicidal Plan/Intent: denies  Hopelesness:Moderate  Homicidal Thoughts:  denies      Medical Decision Making:   Labs:     Lab Results (last 24 hours)     ** No results found for the last 24 hours. **            Radiology:     Imaging Results (Last 24 Hours)     ** No results found for the last 24 hours. **            EKG:     ECG/EMG Results (most recent)     Procedure Component Value Units Date/Time    ECG 12 Lead Other [[id number]] Collected: 05/23/23 1456     Updated: 05/24/23 1756     QT Interval 362 ms      QTC Interval 415 ms     Narrative:      Test Reason : Baseline Cardiac Status~  Blood Pressure :   */*   mmHG  Vent. Rate :  " 79 BPM     Atrial Rate :  79 BPM     P-R Int : 122 ms          QRS Dur :  88 ms      QT Int : 362 ms       P-R-T Axes :  62  38  49 degrees     QTc Int : 415 ms    Normal sinus rhythm  Normal ECG  When compared with ECG of 13-FEB-2019 12:32,  No significant change was found  Confirmed by Simeon Gutierrez ([id number]) on 5/24/2023 5:54:31 PM    Referred By:            Confirmed By: Simeon Gutierrez           Medications:  buprenorphine-naloxone, 2 tablet, Sublingual, Daily  cetirizine, 10 mg, Oral, Daily  FLUoxetine, 20 mg, Oral, Daily  nicotine, 1 patch, Transdermal, Q24H  OLANZapine, 10 mg, Oral, Nightly           All medications reviewed.      Assessment and Plan:    Unspecified psychotic disorder  -R/O bipolar d/o, psychotic d/o  -Increased olanzapine to 10 mg nightly on 5/24/2023  -We will establish outpatient psychiatric care following hospitalization     Post Traumatic Stress Disorder - new  -Meds as above  -Begin fluoxetine 20mg daily  -OP care as above. Refer for trauma focused therapy     Opioid use disorder, severe, dependence, on maintenance medication  -Admission UDS positive for buprenorphine  -Continue Suboxone 16 mg daily  -We will refer for substance abuse treatment following hospitalization     Urinary tract infection  -UA with 3-5 RBC, many WBC, negative nitrite  -Urine culture no growth 72 hours  -STI testing negative      Continue hospitalization for safety and stabilization.  Continue current level of Special Precautions (q15 minute checks).

## 2023-05-28 RX ADMIN — BUPRENORPHINE HYDROCHLORIDE AND NALOXONE HYDROCHLORIDE DIHYDRATE 2 TABLET: 8; 2 TABLET SUBLINGUAL at 08:24

## 2023-05-28 RX ADMIN — IBUPROFEN 400 MG: 400 TABLET, FILM COATED ORAL at 09:13

## 2023-05-28 RX ADMIN — TRAZODONE HYDROCHLORIDE 50 MG: 50 TABLET ORAL at 20:37

## 2023-05-28 RX ADMIN — CETIRIZINE HYDROCHLORIDE 10 MG: 10 TABLET, FILM COATED ORAL at 08:24

## 2023-05-28 RX ADMIN — NICOTINE TRANSDERMAL SYSTEM 1 PATCH: 21 PATCH, EXTENDED RELEASE TRANSDERMAL at 08:24

## 2023-05-28 RX ADMIN — IBUPROFEN 400 MG: 400 TABLET, FILM COATED ORAL at 20:37

## 2023-05-28 RX ADMIN — FLUOXETINE HYDROCHLORIDE 20 MG: 20 CAPSULE ORAL at 08:24

## 2023-05-28 RX ADMIN — HYDROXYZINE HYDROCHLORIDE 50 MG: 50 TABLET ORAL at 20:37

## 2023-05-28 RX ADMIN — OLANZAPINE 10 MG: 10 TABLET, FILM COATED ORAL at 20:37

## 2023-05-28 NOTE — PLAN OF CARE
Goal Outcome Evaluation:  Plan of Care Reviewed With: patient  Patient Agreement with Plan of Care: agrees     Progress: improving  Outcome Evaluation: Pt has been calm and cooperative, spent most of shift in the day room. Pt rates anxiety 7, depression 9, states she has trouble falling asleep and her appetite is good. Pt denies SI/HI/AVH. Pt did mention thoughts of paranoia r/t her brother. Pt voices no complaints to this RN.
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
## 2023-05-28 NOTE — PROGRESS NOTES
"      Inpatient Psych Progress Note     Clinician: Trevor Juarez MD  Admission Date: 5/23/2023  07:57 EDT 05/28/23    Behavioral Health Treatment Plan and Problem List: I have reviewed and approved the Behavioral Health Treatment Plan and Problem list.    Allergies  No Known Allergies    Hospital Day: 5 days      Assessment completed within view of staff    History  CC/clinical focus: psychosis     Interval HPI: Patient seen and evaluated by me.  Chart reviewed. Patient up in lobby, socializing with other patients. She c/o chronic back pain, better with Motrin. She states that she knows that she messed up. Denies any SI/HI. Tolerating medications well.   Med Compliant.  ROS otherwise as below.      Interval Review of Systems:   General ROS: negative for - fever or malaise  Endocrine ROS: negative for - palpitations  Respiratory ROS: no cough, shortness of breath, or wheezing  Cardiovascular ROS: no chest pain or dyspnea on exertion  Gastrointestinal ROS: no abdominal pain,no black or bloody stools    /70 (BP Location: Right arm, Patient Position: Sitting)   Pulse 89   Temp 97.7 °F (36.5 °C) (Temporal)   Resp 18   Ht 160 cm (63\")   Wt 59.2 kg (130 lb 9.6 oz)   LMP 05/21/2023 (Approximate)   SpO2 95%   BMI 23.13 kg/m²     Mental Status Exam  Mood: anxious  Affect: dysphoric   Thought Processes: linear, logical, and goal directed  Thought Content: negativistic  Hallucinations: no  Suicidal Thoughts: denies  Suicidal Plan/Intent: denies  Hopelesness:Moderate  Homicidal Thoughts:  denies      Medical Decision Making:   Labs:     Lab Results (last 24 hours)     ** No results found for the last 24 hours. **            Radiology:     Imaging Results (Last 24 Hours)     ** No results found for the last 24 hours. **            EKG:     ECG/EMG Results (most recent)     Procedure Component Value Units Date/Time    ECG 12 Lead Other [[id number]] Collected: 05/23/23 1456     Updated: 05/24/23 175     QT Interval " 362 ms      QTC Interval 415 ms     Narrative:      Test Reason : Baseline Cardiac Status~  Blood Pressure :   */*   mmHG  Vent. Rate :  79 BPM     Atrial Rate :  79 BPM     P-R Int : 122 ms          QRS Dur :  88 ms      QT Int : 362 ms       P-R-T Axes :  62  38  49 degrees     QTc Int : 415 ms    Normal sinus rhythm  Normal ECG  When compared with ECG of 13-FEB-2019 12:32,  No significant change was found  Confirmed by Simeon Gutierrez ([id number]) on 5/24/2023 5:54:31 PM    Referred By:            Confirmed By: Simeon Gutierrez           Medications:  buprenorphine-naloxone, 2 tablet, Sublingual, Daily  cetirizine, 10 mg, Oral, Daily  FLUoxetine, 20 mg, Oral, Daily  nicotine, 1 patch, Transdermal, Q24H  OLANZapine, 10 mg, Oral, Nightly           All medications reviewed.      Assessment and Plan:      Unspecified psychotic disorder  - R/O bipolar d/o, psychotic d/o  - Increased olanzapine to 10 mg nightly on 5/24/2023  - We will establish outpatient psychiatric care following hospitalization     Post Traumatic Stress Disorder - new  - Meds as above  - Continue fluoxetine 20mg daily  - OP care as above. Refer for trauma focused therapy     Opioid use disorder, severe, dependence, on maintenance medication  - Admission UDS positive for buprenorphine  - Continue Suboxone 16 mg daily  - We will refer for substance abuse treatment following hospitalization     Urinary tract infection  - UA with 3-5 RBC, many WBC, negative nitrite  - Urine culture no growth 72 hours  - STI testing negative      Continue hospitalization for safety and stabilization.  Continue current level of Special Precautions (q15 minute checks).        This note was generated by a scribeValeriano. The work documented in this note was completed, reviewed, and approved by the attending psychiatrist as designated Dr. Trevor Juarez electronic signature.     IrTevor MD, personally performed the services described in this documentation as scribed  by the above named individual and is both accurate and complete.

## 2023-05-29 RX ADMIN — HYDROXYZINE HYDROCHLORIDE 50 MG: 50 TABLET ORAL at 20:18

## 2023-05-29 RX ADMIN — OLANZAPINE 10 MG: 10 TABLET, FILM COATED ORAL at 20:18

## 2023-05-29 RX ADMIN — BUPRENORPHINE HYDROCHLORIDE AND NALOXONE HYDROCHLORIDE DIHYDRATE 2 TABLET: 8; 2 TABLET SUBLINGUAL at 08:41

## 2023-05-29 RX ADMIN — IBUPROFEN 400 MG: 400 TABLET, FILM COATED ORAL at 14:48

## 2023-05-29 RX ADMIN — CETIRIZINE HYDROCHLORIDE 10 MG: 10 TABLET, FILM COATED ORAL at 08:41

## 2023-05-29 RX ADMIN — IBUPROFEN 400 MG: 400 TABLET, FILM COATED ORAL at 20:18

## 2023-05-29 RX ADMIN — FLUOXETINE HYDROCHLORIDE 20 MG: 20 CAPSULE ORAL at 08:41

## 2023-05-29 RX ADMIN — TRAZODONE HYDROCHLORIDE 50 MG: 50 TABLET ORAL at 20:18

## 2023-05-29 RX ADMIN — HYDROXYZINE HYDROCHLORIDE 50 MG: 50 TABLET ORAL at 14:49

## 2023-05-29 RX ADMIN — NICOTINE TRANSDERMAL SYSTEM 1 PATCH: 21 PATCH, EXTENDED RELEASE TRANSDERMAL at 08:41

## 2023-05-29 RX ADMIN — IBUPROFEN 400 MG: 400 TABLET, FILM COATED ORAL at 08:41

## 2023-05-29 NOTE — PLAN OF CARE
Goal Outcome Evaluation:  Plan of Care Reviewed With: patient  Patient Agreement with Plan of Care: agrees                Pt states anxiety 7, depression 7. Pt is calm and cooperative with staff, med compliant

## 2023-05-29 NOTE — PROGRESS NOTES
" Inpatient Psych Progress Note     Clinician: Trevor Juarez MD  Admission Date: 5/23/2023  11:29 EDT 05/29/23    Behavioral Health Treatment Plan and Problem List: I have reviewed and approved the Behavioral Health Treatment Plan and Problem list.    Allergies  No Known Allergies    Hospital Day: 6 days      Assessment completed within view of staff    History  CC/clinical focus: psychosis    Interval HPI: Patient seen and evaluated by me.  Chart reviewed.   Patient rates  level of depression (subjectively) at a  5/10.  Anxiety   3/10.  Patient tolerating meds okay.  Denies side effects.  C/o insomnia.  Med Compliant.  ROS otherwise as below.      Interval Review of Systems:   General ROS: negative for - fever or malaise  Endocrine ROS: negative for - palpitations  Respiratory ROS: no cough, shortness of breath, or wheezing  Cardiovascular ROS: no chest pain or dyspnea on exertion  Gastrointestinal ROS: no abdominal pain,no black or bloody stools    /76 (BP Location: Right arm, Patient Position: Sitting)   Pulse 72   Temp 97.6 °F (36.4 °C) (Temporal)   Resp 18   Ht 160 cm (63\")   Wt 59.2 kg (130 lb 9.6 oz)   LMP 05/21/2023 (Approximate)   SpO2 99%   BMI 23.13 kg/m²     Mental Status Exam  Mood: depressed  Affect:mood congruent   Thought Processes: linear  Thought Content: normal  Hallucinations: no  Suicidal Thoughts: denies  Suicidal Plan/Intent: denies  Hopelesness:mild  Homicidal Thoughts:  denies      Medical Decision Making:   Labs:     Lab Results (last 24 hours)     ** No results found for the last 24 hours. **            Radiology:     Imaging Results (Last 24 Hours)     ** No results found for the last 24 hours. **            EKG:     ECG/EMG Results (most recent)     Procedure Component Value Units Date/Time    ECG 12 Lead Other [[id number]] Collected: 05/23/23 1456     Updated: 05/24/23 1756     QT Interval 362 ms      QTC Interval 415 ms     Narrative:      Test Reason : Baseline Cardiac " Status~  Blood Pressure :   */*   mmHG  Vent. Rate :  79 BPM     Atrial Rate :  79 BPM     P-R Int : 122 ms          QRS Dur :  88 ms      QT Int : 362 ms       P-R-T Axes :  62  38  49 degrees     QTc Int : 415 ms    Normal sinus rhythm  Normal ECG  When compared with ECG of 13-FEB-2019 12:32,  No significant change was found  Confirmed by Simeon Gutierrez ([id number]) on 5/24/2023 5:54:31 PM    Referred By:            Confirmed By: Simeon Gutierrez           Medications:  buprenorphine-naloxone, 2 tablet, Sublingual, Daily  cetirizine, 10 mg, Oral, Daily  FLUoxetine, 20 mg, Oral, Daily  nicotine, 1 patch, Transdermal, Q24H  OLANZapine, 10 mg, Oral, Nightly           All medications reviewed.      Assessment and Plan:    Unspecified psychotic disorder  - R/O bipolar d/o, psychotic d/o  - Increased olanzapine to 10 mg nightly on 5/24/2023  - We will establish outpatient psychiatric care following hospitalization     Post Traumatic Stress Disorder - new  - Meds as above  - Continue fluoxetine 20mg daily  - OP care as above. Refer for trauma focused therapy     Opioid use disorder, severe, dependence, on maintenance medication  - Admission UDS positive for buprenorphine  - Continue Suboxone 16 mg daily  - We will refer for substance abuse treatment following hospitalization     Urinary tract infection  - UA with 3-5 RBC, many WBC, negative nitrite  - Urine culture no growth 72 hours  - STI testing negative      Continue hospitalization for safety and stabilization.  Continue current level of Special Precautions (q15 minute checks).

## 2023-05-29 NOTE — PLAN OF CARE
Goal Outcome Evaluation:  Plan of Care Reviewed With: patient  Patient Agreement with Plan of Care: agrees  Consent Given to Review Plan with: Jurgen Lafleur  Progress: improving  Outcome Evaluation: PT is calm. Rates anxiety 5/10, and depression 6/10. Sleeps fair, and appetite is good. Denies SI, HI and AVH.

## 2023-05-30 RX ORDER — IBUPROFEN 400 MG/1
600 TABLET ORAL EVERY 6 HOURS PRN
Status: DISCONTINUED | OUTPATIENT
Start: 2023-05-30 | End: 2023-05-31 | Stop reason: HOSPADM

## 2023-05-30 RX ADMIN — CETIRIZINE HYDROCHLORIDE 10 MG: 10 TABLET, FILM COATED ORAL at 08:16

## 2023-05-30 RX ADMIN — FLUOXETINE HYDROCHLORIDE 20 MG: 20 CAPSULE ORAL at 08:16

## 2023-05-30 RX ADMIN — BUPRENORPHINE HYDROCHLORIDE AND NALOXONE HYDROCHLORIDE DIHYDRATE 2 TABLET: 8; 2 TABLET SUBLINGUAL at 08:16

## 2023-05-30 RX ADMIN — HYDROXYZINE HYDROCHLORIDE 50 MG: 50 TABLET ORAL at 08:17

## 2023-05-30 RX ADMIN — OLANZAPINE 10 MG: 10 TABLET, FILM COATED ORAL at 20:39

## 2023-05-30 RX ADMIN — IBUPROFEN 600 MG: 400 TABLET, FILM COATED ORAL at 20:39

## 2023-05-30 RX ADMIN — IBUPROFEN 400 MG: 400 TABLET, FILM COATED ORAL at 15:04

## 2023-05-30 RX ADMIN — HYDROXYZINE HYDROCHLORIDE 50 MG: 50 TABLET ORAL at 15:04

## 2023-05-30 RX ADMIN — TRAZODONE HYDROCHLORIDE 50 MG: 50 TABLET ORAL at 20:41

## 2023-05-30 RX ADMIN — IBUPROFEN 400 MG: 400 TABLET, FILM COATED ORAL at 08:16

## 2023-05-30 RX ADMIN — NICOTINE TRANSDERMAL SYSTEM 1 PATCH: 21 PATCH, EXTENDED RELEASE TRANSDERMAL at 08:16

## 2023-05-30 NOTE — PROGRESS NOTES
"INPATIENT PSYCHIATRIC PROGRESS NOTE    Name:  Anna Moore  :  1970  MRN:  [id number]  Visit Number:  [id number]  Length of stay:  7    SUBJECTIVE  CC/Focus of Exam: Psychosis    INTERVAL HISTORY:  Patient is seen for follow up, patient reports musculoskeletal pain, asks for neurontin and increase in Motrin, which motrin was increased, neurontin was not started, she calims the antidepressant not working, when she is informed that prozac/fluoxetine benefits will be more apparent in 4 weeks, and zyprexa will have some benefit with mood and anxiety symptoms, patient rates anxiety and depression as 7/10.      Review of Systems   Musculoskeletal: Positive for arthralgias and myalgias.   Psychiatric/Behavioral: Positive for dysphoric mood and sleep disturbance. The patient is nervous/anxious.    All other systems reviewed and are negative.      OBJECTIVE    Temp:  [96.8 °F (36 °C)-97.1 °F (36.2 °C)] 97.1 °F (36.2 °C)  Heart Rate:  [75-79] 79  Resp:  [18] 18  BP: (119-161)/(69-78) 119/69    MENTAL STATUS EXAM:  Appearance: Casually dressed, good hygeine.   Cooperation: Cooperative  Psychomotor: No psychomotor agitation/retardation, No EPS, No motor tics  Speech: normal rate, amount.  Mood: \"so so \"   Affect: congruent, appropriate  Thought Content: goal directed, no delusional material present  Thought process: linear, organized.  Suicidality: No SI  Homicidality: No HI  Perception: No AH/VH  Insight: fair   Judgment: fair    Lab Results (last 24 hours)     ** No results found for the last 24 hours. **             Imaging Results (Last 24 Hours)     ** No results found for the last 24 hours. **             ECG/EMG Results (most recent)     Procedure Component Value Units Date/Time    ECG 12 Lead Other [[id number]] Collected: 23 1456     Updated: 23     QT Interval 362 ms      QTC Interval 415 ms     Narrative:      Test Reason : Baseline Cardiac Status~  Blood Pressure :   */*   mmHG  Vent. Rate :  " 79 BPM     Atrial Rate :  79 BPM     P-R Int : 122 ms          QRS Dur :  88 ms      QT Int : 362 ms       P-R-T Axes :  62  38  49 degrees     QTc Int : 415 ms    Normal sinus rhythm  Normal ECG  When compared with ECG of 13-FEB-2019 12:32,  No significant change was found  Confirmed by Simeon Gutierrez ([id number]) on 5/24/2023 5:54:31 PM    Referred By:            Confirmed By: Simeon Gutierrez           ALLERGIES: Patient has no known allergies.      Current Facility-Administered Medications:   •  aluminum-magnesium hydroxide-simethicone (MAALOX MAX) 400-400-40 MG/5ML suspension 15 mL, 15 mL, Oral, Q6H PRN, Tee Potter MD  •  benzonatate (TESSALON) capsule 100 mg, 100 mg, Oral, TID PRN, Tee Potter MD  •  benztropine (COGENTIN) tablet 2 mg, 2 mg, Oral, Once PRN **OR** benztropine (COGENTIN) injection 1 mg, 1 mg, Intramuscular, Once PRN, Tee Potter MD  •  buprenorphine-naloxone (SUBOXONE) 8-2 MG per SL tablet 2 tablet, 2 tablet, Sublingual, Daily, Tee Potter MD, 2 tablet at 05/30/23 0816  •  cetirizine (zyrTEC) tablet 10 mg, 10 mg, Oral, Daily, Tee Potter MD, 10 mg at 05/30/23 0816  •  famotidine (PEPCID) tablet 20 mg, 20 mg, Oral, BID PRN, Tee Potter MD  •  FLUoxetine (PROzac) capsule 20 mg, 20 mg, Oral, Daily, Tee Potter MD, 20 mg at 05/30/23 0816  •  hydrOXYzine (ATARAX) tablet 50 mg, 50 mg, Oral, Q6H PRN, Tee Potter MD, 50 mg at 05/30/23 1504  •  ibuprofen (ADVIL,MOTRIN) tablet 400 mg, 400 mg, Oral, Q6H PRN, Tee Potter MD, 400 mg at 05/30/23 1504  •  loperamide (IMODIUM) capsule 2 mg, 2 mg, Oral, Q2H PRN, Tee Potter MD  •  magnesium hydroxide (MILK OF MAGNESIA) suspension 10 mL, 10 mL, Oral, Daily PRN, Tee Potter MD, 10 mL at 05/25/23 0950  •  nicotine (NICODERM CQ) 21 MG/24HR patch 1 patch, 1 patch, Transdermal, Q24H, Tee Potter MD, 1 patch at 05/30/23 0816  •  OLANZapine (zyPREXA) tablet 10 mg, 10 mg, Oral, Nightly, Tee Potter MD, 10 mg at  05/29/23 2018  •  ondansetron (ZOFRAN) tablet 4 mg, 4 mg, Oral, Q6H PRN, Tee Potter MD  •  sodium chloride nasal spray 2 spray, 2 spray, Each Nare, PRN, Tee Potter MD  •  traZODone (DESYREL) tablet 50 mg, 50 mg, Oral, Nightly PRN, Tee Potter MD, 50 mg at 05/29/23 2018    Reviewed chart, notes, vitals, labs and EKG personally    ASSESSMENT & PLAN:      Psychosis unspecified   Olanzapine 10 mg po qhs    Post traumatic stress disorder   Fluoxetine 20 mg po q daily  Therapy    Opioid use on MAT  SUBOXONE 16 MG DAILY          Special precautions: Special Precautions Level 3 (q15 min checks)     Behavioral Health Treatment Plan and Problem List: I have reviewed and approved the Behavioral Health Treatment Plan and Problem list.  The patient has had a chance to review and agrees with the treatment plan.     Clinician:  Jame Duron MD  05/30/23  17:44 EDT
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
## 2023-05-30 NOTE — PLAN OF CARE
Goal Outcome Evaluation:  Plan of Care Reviewed With: patient  Patient Agreement with Plan of Care: agrees     Progress: improving  Outcome Evaluation: pt. rates anxiety 5 rates depression 6 she verbalzies her appetite is good she verbalzies sleeping all night denies s/i denies h/i she verbalzies is like she is hearing crying but thinks its a patient she keeps hearing.pt is calm & cooperative minimal interaction noted with peers .

## 2023-05-30 NOTE — PLAN OF CARE
Goal Outcome Evaluation:  Plan of Care Reviewed With: patient  Patient Agreement with Plan of Care: agrees               Pt states anxiety 7, depression 4. She is calm and cooperative with staff, med compliant

## 2023-05-31 VITALS
TEMPERATURE: 97.1 F | HEART RATE: 86 BPM | BODY MASS INDEX: 23.14 KG/M2 | DIASTOLIC BLOOD PRESSURE: 60 MMHG | HEIGHT: 63 IN | RESPIRATION RATE: 16 BRPM | SYSTOLIC BLOOD PRESSURE: 98 MMHG | OXYGEN SATURATION: 97 % | WEIGHT: 130.6 LBS

## 2023-05-31 PROBLEM — F11.20 OPIOID USE DISORDER, SEVERE, ON MAINTENANCE THERAPY, DEPENDENCE: Status: ACTIVE | Noted: 2023-05-31

## 2023-05-31 PROBLEM — F31.63: Status: ACTIVE | Noted: 2023-05-31

## 2023-05-31 PROBLEM — R44.3 HALLUCINATIONS: Status: RESOLVED | Noted: 2023-05-23 | Resolved: 2023-05-31

## 2023-05-31 PROBLEM — F43.10 POST TRAUMATIC STRESS DISORDER (PTSD): Status: ACTIVE | Noted: 2023-05-31

## 2023-05-31 PROCEDURE — 99239 HOSP IP/OBS DSCHRG MGMT >30: CPT | Performed by: PSYCHIATRY & NEUROLOGY

## 2023-05-31 RX ORDER — CETIRIZINE HYDROCHLORIDE 10 MG/1
10 TABLET ORAL DAILY
Qty: 30 TABLET | Refills: 0 | Status: SHIPPED | OUTPATIENT
Start: 2023-06-01

## 2023-05-31 RX ORDER — OLANZAPINE 10 MG/1
10 TABLET ORAL NIGHTLY
Qty: 30 TABLET | Refills: 0 | Status: SHIPPED | OUTPATIENT
Start: 2023-05-31 | End: 2023-06-10 | Stop reason: HOSPADM

## 2023-05-31 RX ORDER — FLUOXETINE HYDROCHLORIDE 20 MG/1
20 CAPSULE ORAL DAILY
Qty: 30 CAPSULE | Refills: 0 | Status: SHIPPED | OUTPATIENT
Start: 2023-06-01 | End: 2023-06-10 | Stop reason: HOSPADM

## 2023-05-31 RX ORDER — TRAZODONE HYDROCHLORIDE 50 MG/1
50 TABLET ORAL NIGHTLY PRN
Qty: 30 TABLET | Refills: 0 | Status: SHIPPED | OUTPATIENT
Start: 2023-05-31 | End: 2023-06-10 | Stop reason: HOSPADM

## 2023-05-31 RX ORDER — MELOXICAM 7.5 MG/1
7.5 TABLET ORAL DAILY
Qty: 30 TABLET | Refills: 0 | Status: SHIPPED | OUTPATIENT
Start: 2023-05-31 | End: 2023-06-10 | Stop reason: HOSPADM

## 2023-05-31 RX ADMIN — NICOTINE TRANSDERMAL SYSTEM 1 PATCH: 21 PATCH, EXTENDED RELEASE TRANSDERMAL at 08:31

## 2023-05-31 RX ADMIN — FLUOXETINE HYDROCHLORIDE 20 MG: 20 CAPSULE ORAL at 08:31

## 2023-05-31 RX ADMIN — BUPRENORPHINE HYDROCHLORIDE AND NALOXONE HYDROCHLORIDE DIHYDRATE 2 TABLET: 8; 2 TABLET SUBLINGUAL at 08:31

## 2023-05-31 RX ADMIN — CETIRIZINE HYDROCHLORIDE 10 MG: 10 TABLET, FILM COATED ORAL at 08:31

## 2023-05-31 RX ADMIN — IBUPROFEN 600 MG: 400 TABLET, FILM COATED ORAL at 13:19

## 2023-05-31 NOTE — PLAN OF CARE
On date of discharge, patient is calm and cooperative. Patient denies SI/HI/AVH. Patient reports improvement in mood and symptoms during hospitalization. She is very pleasant and reports her mood is good. Patient is future oriented, looking forward to starting  PHP. Patient continues to present with some paranoia and delusional thinking about her brother, but symptoms have lessened significantly over her stay. Patient is able to identify protective factors and healthy coping skills. Patient is agreeable to return to the nearest ED/contact 911 if they experience SI/HI/AVH. Patient denies needs or concerns prior to discharge today.

## 2023-05-31 NOTE — PLAN OF CARE
Goal Outcome Evaluation:  Plan of Care Reviewed With: patient  Patient Agreement with Plan of Care: agrees     Progress: improving  Outcome Evaluation: pt. discharge today rates anxiety 5 denies depression denies a/v/h denies s/i denies h/i pt. discharged today

## 2023-05-31 NOTE — PLAN OF CARE
Goal Outcome Evaluation:  Plan of Care Reviewed With: patient  Patient Agreement with Plan of Care: agrees     Progress: improving  Outcome Evaluation: Pt calm and cooperative this shift and hoping to get an appointment set up in the [facility] for Thursday and go  home today.

## 2023-06-01 NOTE — DISCHARGE SUMMARY
"      PSYCHIATRIC DISCHARGE SUMMARY     Patient Identification:  Name:  Anna Moore  Age:  53 y.o.  Sex:  female  :  1970  MRN:  [id number]  Visit Number:  [id number]    Date of Admission:2023   Date of Discharge: 2023     Discharge Diagnosis:  Active Problems:    Severe mixed bipolar 1 disorder    Post traumatic stress disorder (PTSD)    Opioid use disorder, severe, on maintenance therapy, dependence      Admission Diagnosis:  Hallucinations [R44.3]     Hospital Course  Patient is a 53 y.o. female presented with concern for psychosis.  Admitted for crisis stabilization.  Admission labs with UDS positive for buprenorphine.  Patient with a history of trauma, recent paranoia and auditory hallucinations.  Patient started on olanzapine, eventually titrated to 10 mg nightly.  Patient also started on fluoxetine 20 mg daily.  Medication changes were well tolerated.  Psychotic symptoms improved.  Insomnia improved significantly.  Family involved in treatment and safe discharge planning.  Outpatient care ascertained.    On the day of discharge, patient denied SI, HI or AVH. Patient was stable and appropriate by the conclusion of this admission, denying significant symptoms of mood, psychotic or thought disorder. Patient showed improvement of presenting symptoms and was deemed appropriate for discharge today.    Mental Status Exam upon discharge:   Mood \"better\"   Affect-congruent, appropriate, stable  Thought Content-goal directed, no delusional material present  Thought process-linear, organized.  Suicidality: No SI  Homicidality: No HI  Perception: No AH/VH    Procedures Performed         Consults:   Consults     No orders found from 2023 to 2023.          Pertinent Test Results:   Lab Results (last 7 days)     ** No results found for the last 168 hours. **          Condition on Discharge:  improved    Vital Signs       Discharge Disposition:  Home or Self Care    Discharge Medications:   "   Discharge Medications      New Medications      Instructions Start Date   cetirizine 10 MG tablet  Commonly known as: zyrTEC   10 mg, Oral, Daily      FLUoxetine 20 MG capsule  Commonly known as: PROzac   20 mg, Oral, Daily      meloxicam 7.5 MG tablet  Commonly known as: Mobic   7.5 mg, Oral, Daily      OLANZapine 10 MG tablet  Commonly known as: zyPREXA   10 mg, Oral, Nightly      traZODone 50 MG tablet  Commonly known as: DESYREL   50 mg, Oral, Nightly PRN         Continue These Medications      Instructions Start Date   buprenorphine-naloxone 8-2 MG per SL tablet  Commonly known as: SUBOXONE   2 tablets, Sublingual, Daily             Discharge Diet: Normal  Diet Instructions    Regular diet           Activity at Discharge: Normal  Activity Instructions    As tolerated            Follow-up Appointments  No future appointments.  Additional Instructions for the Follow-ups that You Need to Schedule     Discharge Follow-up with PCP   As directed       Currently Documented PCP:    Shruti Kumari APRN    PCP Phone Number:    [phone]     Follow Up Details: Establish care               Test Results Pending at Discharge  None     Time: I spent greater than 30 minutes on this discharge activity which included: face-to-face encounter with the patient, reviewing the data in the system, coordination of the care with the nursing staff as well as consultants, documentation, and entering orders.      Clinician:   Tee Potter MD  06/01/23  11:59 EDT

## 2023-06-01 NOTE — PROGRESS NOTES
Behavioral Health Discharge Summary             Please fax within 24 hours of discharge to [facility] at: [phone]      Member Name: Anna Moore Member ID: [id number]   Authorization Number: [id number] Phone: [phone]   Member Address: [address] [place] [address]   Discharge Date: 05/31/2023 Level of Care at Discharge: Inpatient behavioral health to outpatient   Facility: [facility] Staff Completing Form: HERNAN Smith RN   If the member is being discharged directly to a residential or extended care program, please specify the type below.   __Private Child-Caring Facility (PCC) Residential/Group Home   __Private Child-Caring Facility (PCC) Therapeutic Foster Care   __Residential Treatment Facility (RTF)   __Psychiatric Residential Treatment Facility (PRTF I or II)   __Long-Term Acute Inpatient Hospital Services or Extended Care Unit (ECU)   __Other (please specify):    Brief discharge summary of treatment received (for follow up by the case management team): D/C clinical with list of medications and follow up appts given to patient upon discharge.     BRIEF SUMMARY OF RECOMMENDATIONS FOR ONGOING TREATMENT     Discharged to where: Home   Discharge diagnoses: F31.60 - Severe mixed bipolar 1 disorder; F43.10 - Post Traumatic Stress Disorder (PTSD)   Axis I:    Axis II:    Axis III:    Axis IV:    Axis V:    Does the member understand his/her DX?  Yes          Medication     Dose     Schedule Supply/  Quantity  Given at Discharge RX Provided  Yes/No  If Rx Provided, Quantity RX Prior Auth Required  Yes/No Prior Auth  Completed   Prozac 20mg Daily        Zyprexa  10mg Nightly        Trazadone 50mg Nightly PRN                                                                    Does the member understand the reason for taking these medications? Yes                                                           FOLLOW-UP APPOINTMENTS   Please schedule within 7 days  of discharge and provide appointment details for all referred services.    PCP/Other Providers Involved in Treatment:    Appointment Type: Outpatient behavioral health Provider Name: [facility] Provider Phone:   [phone] Appointment Date: 06/06/2023 Appointment Time: 9AM EST     Assessment   (new to OP services)        Case Management    Is the member already enrolled in case management?  Yes/No  If yes, date the CM was notified:    If no, was the CM referral offered?  Yes/No  Accepted? Yes/No    Is the Release of Information in the chart? Yes/No:      Medication Management (for member discharged with psychiatric medications):      A&D Treatment (for member with substance abuse/   dependence in the past year):      Medical Condition (for member with a medical condition):    Other recommended treatment:    Do you have any concerns about the discharge plan?  No    If yes, explain:    Was the member involved in the discharge planning?  Yes    If no, explain:    Was a copy of the discharge plan provided to the member?  Yes    If no, explain:

## 2023-06-03 ENCOUNTER — HOSPITAL ENCOUNTER (EMERGENCY)
Facility: HOSPITAL | Age: 53
Discharge: STILL ADMITTED OR ADMINISTRATIVE | DRG: 885 | End: 2023-06-03
Attending: EMERGENCY MEDICINE
Payer: COMMERCIAL

## 2023-06-03 ENCOUNTER — HOSPITAL ENCOUNTER (INPATIENT)
Facility: HOSPITAL | Age: 53
LOS: 7 days | Discharge: HOME | DRG: 885 | End: 2023-06-10
Attending: PSYCHIATRY & NEUROLOGY | Admitting: PSYCHIATRY & NEUROLOGY
Payer: COMMERCIAL

## 2023-06-03 VITALS
HEART RATE: 79 BPM | BODY MASS INDEX: 24.27 KG/M2 | DIASTOLIC BLOOD PRESSURE: 77 MMHG | TEMPERATURE: 97.7 F | RESPIRATION RATE: 18 BRPM | HEIGHT: 63 IN | WEIGHT: 137 LBS | SYSTOLIC BLOOD PRESSURE: 138 MMHG | OXYGEN SATURATION: 97 %

## 2023-06-03 DIAGNOSIS — F29 PSYCHOSIS, UNSPECIFIED PSYCHOSIS TYPE: Primary | ICD-10-CM

## 2023-06-03 DIAGNOSIS — F11.20 OPIOID USE DISORDER, SEVERE, ON MAINTENANCE THERAPY, DEPENDENCE: Primary | ICD-10-CM

## 2023-06-03 PROBLEM — F22 DELUSION: Status: ACTIVE | Noted: 2023-06-03

## 2023-06-03 LAB
ALBUMIN SERPL-MCNC: 4 G/DL (ref 3.5–5.2)
ALBUMIN/GLOB SERPL: 1.2 G/DL
ALP SERPL-CCNC: 71 U/L (ref 39–117)
ALT SERPL W P-5'-P-CCNC: 34 U/L (ref 1–33)
AMPHET+METHAMPHET UR QL: NEGATIVE
AMPHETAMINES UR QL: NEGATIVE
ANION GAP SERPL CALCULATED.3IONS-SCNC: 10.8 MMOL/L (ref 5–15)
AST SERPL-CCNC: 24 U/L (ref 1–32)
BACTERIA UR QL AUTO: ABNORMAL /HPF
BARBITURATES UR QL SCN: NEGATIVE
BASOPHILS # BLD AUTO: 0.14 10*3/MM3 (ref 0–0.2)
BASOPHILS NFR BLD AUTO: 1.5 % (ref 0–1.5)
BENZODIAZ UR QL SCN: NEGATIVE
BILIRUB SERPL-MCNC: 0.3 MG/DL (ref 0–1.2)
BILIRUB UR QL STRIP: NEGATIVE
BUN SERPL-MCNC: 13 MG/DL (ref 6–20)
BUN/CREAT SERPL: 17.1 (ref 7–25)
BUPRENORPHINE SERPL-MCNC: POSITIVE NG/ML
CALCIUM SPEC-SCNC: 9.3 MG/DL (ref 8.6–10.5)
CANNABINOIDS SERPL QL: NEGATIVE
CHLORIDE SERPL-SCNC: 104 MMOL/L (ref 98–107)
CLARITY UR: ABNORMAL
CO2 SERPL-SCNC: 24.2 MMOL/L (ref 22–29)
COCAINE UR QL: NEGATIVE
COLOR UR: ABNORMAL
CREAT SERPL-MCNC: 0.76 MG/DL (ref 0.57–1)
DEPRECATED RDW RBC AUTO: 45 FL (ref 37–54)
EGFRCR SERPLBLD CKD-EPI 2021: 94.4 ML/MIN/1.73
EOSINOPHIL # BLD AUTO: 0.45 10*3/MM3 (ref 0–0.4)
EOSINOPHIL NFR BLD AUTO: 4.7 % (ref 0.3–6.2)
ERYTHROCYTE [DISTWIDTH] IN BLOOD BY AUTOMATED COUNT: 13 % (ref 12.3–15.4)
ETHANOL BLD-MCNC: <10 MG/DL (ref 0–10)
ETHANOL UR QL: <0.01 %
FLUAV RNA RESP QL NAA+PROBE: NOT DETECTED
FLUBV RNA RESP QL NAA+PROBE: NOT DETECTED
GLOBULIN UR ELPH-MCNC: 3.4 GM/DL
GLUCOSE SERPL-MCNC: 105 MG/DL (ref 65–99)
GLUCOSE UR STRIP-MCNC: NEGATIVE MG/DL
HCG SERPL QL: NEGATIVE
HCT VFR BLD AUTO: 40.9 % (ref 34–46.6)
HGB BLD-MCNC: 13.3 G/DL (ref 12–15.9)
HGB UR QL STRIP.AUTO: NEGATIVE
HYALINE CASTS UR QL AUTO: ABNORMAL /LPF
IMM GRANULOCYTES # BLD AUTO: 0.03 10*3/MM3 (ref 0–0.05)
IMM GRANULOCYTES NFR BLD AUTO: 0.3 % (ref 0–0.5)
KETONES UR QL STRIP: NEGATIVE
LEUKOCYTE ESTERASE UR QL STRIP.AUTO: ABNORMAL
LYMPHOCYTES # BLD AUTO: 3.04 10*3/MM3 (ref 0.7–3.1)
LYMPHOCYTES NFR BLD AUTO: 32 % (ref 19.6–45.3)
MAGNESIUM SERPL-MCNC: 1.9 MG/DL (ref 1.6–2.6)
MCH RBC QN AUTO: 30.5 PG (ref 26.6–33)
MCHC RBC AUTO-ENTMCNC: 32.5 G/DL (ref 31.5–35.7)
MCV RBC AUTO: 93.8 FL (ref 79–97)
METHADONE UR QL SCN: NEGATIVE
MONOCYTES # BLD AUTO: 0.97 10*3/MM3 (ref 0.1–0.9)
MONOCYTES NFR BLD AUTO: 10.2 % (ref 5–12)
NEUTROPHILS NFR BLD AUTO: 4.87 10*3/MM3 (ref 1.7–7)
NEUTROPHILS NFR BLD AUTO: 51.3 % (ref 42.7–76)
NITRITE UR QL STRIP: POSITIVE
NRBC BLD AUTO-RTO: 0 /100 WBC (ref 0–0.2)
OPIATES UR QL: NEGATIVE
OXYCODONE UR QL SCN: NEGATIVE
PCP UR QL SCN: NEGATIVE
PH UR STRIP.AUTO: 7 [PH] (ref 5–8)
PLATELET # BLD AUTO: 284 10*3/MM3 (ref 140–450)
PMV BLD AUTO: 9.9 FL (ref 6–12)
POTASSIUM SERPL-SCNC: 3.8 MMOL/L (ref 3.5–5.2)
PROPOXYPH UR QL: NEGATIVE
PROT SERPL-MCNC: 7.4 G/DL (ref 6–8.5)
PROT UR QL STRIP: ABNORMAL
QT INTERVAL: 402 MS
QTC INTERVAL: 421 MS
RBC # BLD AUTO: 4.36 10*6/MM3 (ref 3.77–5.28)
RBC # UR STRIP: ABNORMAL /HPF
REF LAB TEST METHOD: ABNORMAL
SARS-COV-2 RNA RESP QL NAA+PROBE: NOT DETECTED
SODIUM SERPL-SCNC: 139 MMOL/L (ref 136–145)
SP GR UR STRIP: 1.02 (ref 1–1.03)
SQUAMOUS #/AREA URNS HPF: ABNORMAL /HPF
TRICYCLICS UR QL SCN: NEGATIVE
UROBILINOGEN UR QL STRIP: ABNORMAL
WBC # UR STRIP: ABNORMAL /HPF
WBC NRBC COR # BLD: 9.5 10*3/MM3 (ref 3.4–10.8)

## 2023-06-03 PROCEDURE — 80053 COMPREHEN METABOLIC PANEL: CPT | Performed by: EMERGENCY MEDICINE

## 2023-06-03 PROCEDURE — 82077 ASSAY SPEC XCP UR&BREATH IA: CPT | Performed by: EMERGENCY MEDICINE

## 2023-06-03 PROCEDURE — 85025 COMPLETE CBC W/AUTO DIFF WBC: CPT | Performed by: EMERGENCY MEDICINE

## 2023-06-03 PROCEDURE — 87077 CULTURE AEROBIC IDENTIFY: CPT | Performed by: EMERGENCY MEDICINE

## 2023-06-03 PROCEDURE — 36415 COLL VENOUS BLD VENIPUNCTURE: CPT

## 2023-06-03 PROCEDURE — 87086 URINE CULTURE/COLONY COUNT: CPT | Performed by: EMERGENCY MEDICINE

## 2023-06-03 PROCEDURE — 84703 CHORIONIC GONADOTROPIN ASSAY: CPT | Performed by: EMERGENCY MEDICINE

## 2023-06-03 PROCEDURE — 87186 SC STD MICRODIL/AGAR DIL: CPT | Performed by: EMERGENCY MEDICINE

## 2023-06-03 PROCEDURE — 81001 URINALYSIS AUTO W/SCOPE: CPT | Performed by: EMERGENCY MEDICINE

## 2023-06-03 PROCEDURE — 99285 EMERGENCY DEPT VISIT HI MDM: CPT

## 2023-06-03 PROCEDURE — 83735 ASSAY OF MAGNESIUM: CPT | Performed by: EMERGENCY MEDICINE

## 2023-06-03 PROCEDURE — 93005 ELECTROCARDIOGRAM TRACING: CPT | Performed by: PSYCHIATRY & NEUROLOGY

## 2023-06-03 PROCEDURE — 87636 SARSCOV2 & INF A&B AMP PRB: CPT | Performed by: EMERGENCY MEDICINE

## 2023-06-03 PROCEDURE — 80306 DRUG TEST PRSMV INSTRMNT: CPT | Performed by: EMERGENCY MEDICINE

## 2023-06-03 RX ORDER — FAMOTIDINE 20 MG/1
20 TABLET, FILM COATED ORAL 2 TIMES DAILY PRN
Status: DISCONTINUED | OUTPATIENT
Start: 2023-06-03 | End: 2023-06-10 | Stop reason: HOSPADM

## 2023-06-03 RX ORDER — TRAZODONE HYDROCHLORIDE 50 MG/1
50 TABLET ORAL NIGHTLY PRN
Status: DISCONTINUED | OUTPATIENT
Start: 2023-06-03 | End: 2023-06-04

## 2023-06-03 RX ORDER — MELOXICAM 7.5 MG/1
7.5 TABLET ORAL DAILY
Status: DISCONTINUED | OUTPATIENT
Start: 2023-06-04 | End: 2023-06-06

## 2023-06-03 RX ORDER — ACETAMINOPHEN 325 MG/1
650 TABLET ORAL EVERY 6 HOURS PRN
Status: DISCONTINUED | OUTPATIENT
Start: 2023-06-03 | End: 2023-06-10 | Stop reason: HOSPADM

## 2023-06-03 RX ORDER — TRAZODONE HYDROCHLORIDE 50 MG/1
50 TABLET ORAL NIGHTLY PRN
Status: CANCELLED | OUTPATIENT
Start: 2023-06-03

## 2023-06-03 RX ORDER — BENZTROPINE MESYLATE 1 MG/ML
1 INJECTION INTRAMUSCULAR; INTRAVENOUS ONCE AS NEEDED
Status: DISCONTINUED | OUTPATIENT
Start: 2023-06-03 | End: 2023-06-10 | Stop reason: HOSPADM

## 2023-06-03 RX ORDER — OLANZAPINE 10 MG/1
10 TABLET ORAL NIGHTLY
Status: DISCONTINUED | OUTPATIENT
Start: 2023-06-03 | End: 2023-06-04

## 2023-06-03 RX ORDER — CEFDINIR 300 MG/1
300 CAPSULE ORAL EVERY 12 HOURS SCHEDULED
Status: COMPLETED | OUTPATIENT
Start: 2023-06-03 | End: 2023-06-10

## 2023-06-03 RX ORDER — IBUPROFEN 400 MG/1
400 TABLET ORAL EVERY 6 HOURS PRN
Status: DISCONTINUED | OUTPATIENT
Start: 2023-06-03 | End: 2023-06-10 | Stop reason: HOSPADM

## 2023-06-03 RX ORDER — BENZONATATE 100 MG/1
100 CAPSULE ORAL 3 TIMES DAILY PRN
Status: DISCONTINUED | OUTPATIENT
Start: 2023-06-03 | End: 2023-06-10 | Stop reason: HOSPADM

## 2023-06-03 RX ORDER — HYDROXYZINE 50 MG/1
50 TABLET, FILM COATED ORAL EVERY 6 HOURS PRN
Status: DISCONTINUED | OUTPATIENT
Start: 2023-06-03 | End: 2023-06-10 | Stop reason: HOSPADM

## 2023-06-03 RX ORDER — BENZTROPINE MESYLATE 1 MG/1
2 TABLET ORAL ONCE AS NEEDED
Status: DISCONTINUED | OUTPATIENT
Start: 2023-06-03 | End: 2023-06-10 | Stop reason: HOSPADM

## 2023-06-03 RX ORDER — ECHINACEA PURPUREA EXTRACT 125 MG
2 TABLET ORAL AS NEEDED
Status: DISCONTINUED | OUTPATIENT
Start: 2023-06-03 | End: 2023-06-10 | Stop reason: HOSPADM

## 2023-06-03 RX ORDER — BUPRENORPHINE HYDROCHLORIDE AND NALOXONE HYDROCHLORIDE DIHYDRATE 8; 2 MG/1; MG/1
2 TABLET SUBLINGUAL DAILY
Status: DISCONTINUED | OUTPATIENT
Start: 2023-06-04 | End: 2023-06-10 | Stop reason: HOSPADM

## 2023-06-03 RX ORDER — ONDANSETRON 4 MG/1
4 TABLET, FILM COATED ORAL EVERY 6 HOURS PRN
Status: DISCONTINUED | OUTPATIENT
Start: 2023-06-03 | End: 2023-06-10 | Stop reason: HOSPADM

## 2023-06-03 RX ORDER — NICOTINE 21 MG/24HR
1 PATCH, TRANSDERMAL 24 HOURS TRANSDERMAL
Status: DISCONTINUED | OUTPATIENT
Start: 2023-06-04 | End: 2023-06-10 | Stop reason: HOSPADM

## 2023-06-03 RX ORDER — ALUMINA, MAGNESIA, AND SIMETHICONE 2400; 2400; 240 MG/30ML; MG/30ML; MG/30ML
15 SUSPENSION ORAL EVERY 6 HOURS PRN
Status: DISCONTINUED | OUTPATIENT
Start: 2023-06-03 | End: 2023-06-10 | Stop reason: HOSPADM

## 2023-06-03 RX ORDER — CETIRIZINE HYDROCHLORIDE 10 MG/1
10 TABLET ORAL DAILY
Status: DISCONTINUED | OUTPATIENT
Start: 2023-06-04 | End: 2023-06-10 | Stop reason: HOSPADM

## 2023-06-03 RX ORDER — LOPERAMIDE HYDROCHLORIDE 2 MG/1
2 CAPSULE ORAL
Status: DISCONTINUED | OUTPATIENT
Start: 2023-06-03 | End: 2023-06-10 | Stop reason: HOSPADM

## 2023-06-03 RX ORDER — FLUOXETINE HYDROCHLORIDE 20 MG/1
20 CAPSULE ORAL DAILY
Status: DISCONTINUED | OUTPATIENT
Start: 2023-06-04 | End: 2023-06-06

## 2023-06-03 RX ADMIN — OLANZAPINE 10 MG: 10 TABLET, FILM COATED ORAL at 20:34

## 2023-06-03 RX ADMIN — CEFDINIR 300 MG: 300 CAPSULE ORAL at 22:38

## 2023-06-03 RX ADMIN — HYDROXYZINE HYDROCHLORIDE 50 MG: 50 TABLET ORAL at 20:34

## 2023-06-03 RX ADMIN — TRAZODONE HYDROCHLORIDE 50 MG: 50 TABLET ORAL at 20:34

## 2023-06-03 NOTE — NURSING NOTE
Patient brought to the ER by her boyfriend. He had called earlier asking if he can bring her back. Patient states that she just got out two days ago and that she sees Kamilah and had told her about the voices and told her that if they start back to come in. The patient states that she had never heard voices until coming into the [facility]. She reports that she lives in a trailer and at night can her her sister in law crying and her brother talking. She states that she feels like they are under the trailer. Her boyfriend told her she is crazy and needs to be put back in. Patient states I know it is not real but it feels like it. Denies SI or HI. She denies current etoh or drug use. She states I do sleep but it hear them when I lay down on the mattress to try to sleep. She states I can't sleep and I don't feel safe at home. I need help. Patient states that her son brought her in.

## 2023-06-03 NOTE — NURSING NOTE
Dr. Potter called via phone. Instructed that he did get a call on this patient. Instructed that if she does not feel safe to go ahead and admit once her labs are resulted. With routine orders. [id number]

## 2023-06-03 NOTE — NURSING NOTE
Per ER provider, the patient should be on omnicef 300 mg bid for 7 days for a uti. Information verbalized.
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
## 2023-06-03 NOTE — ED PROVIDER NOTES
Subjective   History of Present Illness  53-year-old female, recently discharged from psychiatric unit, presents complaining of persistent hallucinations and anxiety.  Hallucinations are auditory.  She denies visual hallucinations.  She denies suicidal homicidal ideations, alcohol or drug abuse, other symptoms or other complaints.    Review of Systems   All other systems reviewed and are negative.    Past Medical History:   Diagnosis Date    Hypertension     Psychosis     Seizures     Substance abuse        No Known Allergies    Past Surgical History:   Procedure Laterality Date    ANKLE SURGERY      APPENDECTOMY      TONSILLECTOMY         No family history on file.    Social History     Socioeconomic History    Marital status:    Tobacco Use    Smoking status: Every Day     Packs/day: 2.00     Types: Cigarettes    Smokeless tobacco: Never   Vaping Use    Vaping Use: Never used   Substance and Sexual Activity    Alcohol use: Yes     Comment: drinks a couple beers every other day     Drug use: Defer     Types: Other     Comment: DENIES drug use but on suboxone    Sexual activity: Not Currently           Objective   Physical Exam  Vitals and nursing note reviewed.   Constitutional:       General: She is not in acute distress.     Appearance: Normal appearance. She is well-developed. She is not ill-appearing, toxic-appearing or diaphoretic.   HENT:      Head: Normocephalic and atraumatic.   Eyes:      General: No scleral icterus.     Pupils: Pupils are equal, round, and reactive to light.   Neck:      Trachea: No tracheal deviation.   Cardiovascular:      Rate and Rhythm: Normal rate and regular rhythm.   Pulmonary:      Effort: Pulmonary effort is normal. No respiratory distress.      Breath sounds: Normal breath sounds.   Chest:      Chest wall: No tenderness.   Abdominal:      General: Bowel sounds are normal.      Palpations: Abdomen is soft.      Tenderness: There is no abdominal tenderness. There is no  guarding or rebound.   Musculoskeletal:         General: No tenderness. Normal range of motion.      Cervical back: Normal range of motion and neck supple. No rigidity or tenderness.      Right lower leg: No edema.      Left lower leg: No edema.   Skin:     General: Skin is warm and dry.      Capillary Refill: Capillary refill takes less than 2 seconds.      Coloration: Skin is not pale.   Neurological:      General: No focal deficit present.      Mental Status: She is alert and oriented to person, place, and time.      GCS: GCS eye subscore is 4. GCS verbal subscore is 5. GCS motor subscore is 6.      Motor: No abnormal muscle tone.   Psychiatric:         Behavior: Behavior normal.       Procedures  Results for orders placed or performed during the hospital encounter of 06/03/23   COVID-19 and FLU A/B PCR - Swab, Nasopharynx    Specimen: Nasopharynx; Swab   Result Value Ref Range    COVID19 Not Detected Not Detected - Ref. Range    Influenza A PCR Not Detected Not Detected    Influenza B PCR Not Detected Not Detected   Urinalysis With Microscopic If Indicated (No Culture) - Urine, Clean Catch    Specimen: Urine, Clean Catch   Result Value Ref Range    Color, UA Dark Yellow (A) Yellow, Straw    Appearance, UA Cloudy (A) Clear    pH, UA 7.0 5.0 - 8.0    Specific Gravity, UA 1.020 1.005 - 1.030    Glucose, UA Negative Negative    Ketones, UA Negative Negative    Bilirubin, UA Negative Negative    Blood, UA Negative Negative    Protein, UA Trace (A) Negative    Leuk Esterase, UA Large (3+) (A) Negative    Nitrite, UA Positive (A) Negative    Urobilinogen, UA 1.0 E.U./dL 0.2 - 1.0 E.U./dL   Urine Drug Screen - Urine, Clean Catch    Specimen: Urine, Clean Catch   Result Value Ref Range    THC, Screen, Urine Negative Negative    Phencyclidine (PCP), Urine Negative Negative    Cocaine Screen, Urine Negative Negative    Methamphetamine, Ur Negative Negative    Opiate Screen Negative Negative    Amphetamine Screen, Urine  Negative Negative    Benzodiazepine Screen, Urine Negative Negative    Tricyclic Antidepressants Screen Negative Negative    Methadone Screen, Urine Negative Negative    Barbiturates Screen, Urine Negative Negative    Oxycodone Screen, Urine Negative Negative    Propoxyphene Screen Negative Negative    Buprenorphine, Screen, Urine Positive (A) Negative   CBC Auto Differential    Specimen: Hand, Left; Blood   Result Value Ref Range    WBC 9.50 3.40 - 10.80 10*3/mm3    RBC 4.36 3.77 - 5.28 10*6/mm3    Hemoglobin 13.3 12.0 - 15.9 g/dL    Hematocrit 40.9 34.0 - 46.6 %    MCV 93.8 79.0 - 97.0 fL    MCH 30.5 26.6 - 33.0 pg    MCHC 32.5 31.5 - 35.7 g/dL    RDW 13.0 12.3 - 15.4 %    RDW-SD 45.0 37.0 - 54.0 fl    MPV 9.9 6.0 - 12.0 fL    Platelets 284 140 - 450 10*3/mm3    Neutrophil % 51.3 42.7 - 76.0 %    Lymphocyte % 32.0 19.6 - 45.3 %    Monocyte % 10.2 5.0 - 12.0 %    Eosinophil % 4.7 0.3 - 6.2 %    Basophil % 1.5 0.0 - 1.5 %    Immature Grans % 0.3 0.0 - 0.5 %    Neutrophils, Absolute 4.87 1.70 - 7.00 10*3/mm3    Lymphocytes, Absolute 3.04 0.70 - 3.10 10*3/mm3    Monocytes, Absolute 0.97 (H) 0.10 - 0.90 10*3/mm3    Eosinophils, Absolute 0.45 (H) 0.00 - 0.40 10*3/mm3    Basophils, Absolute 0.14 0.00 - 0.20 10*3/mm3    Immature Grans, Absolute 0.03 0.00 - 0.05 10*3/mm3    nRBC 0.0 0.0 - 0.2 /100 WBC   Urinalysis, Microscopic Only - Urine, Clean Catch    Specimen: Urine, Clean Catch   Result Value Ref Range    RBC, UA 3-5 (A) None Seen, 0-2 /HPF    WBC, UA Too Numerous to Count (A) None Seen, 0-2 /HPF    Bacteria, UA 1+ (A) None Seen /HPF    Squamous Epithelial Cells, UA 0-2 None Seen, 0-2 /HPF    Hyaline Casts, UA None Seen None Seen /LPF    Methodology Automated Microscopy               ED Course  ED Course as of 06/03/23 1745   Sat Jun 03, 2023   1741 Patient has been evaluated by psychiatry intake.  She is being admitted to the psychiatry unit/discharged to behavioral health.  Urinalysis very suspicious for UTI.  I  have recommended to them Omnicef 300 mg twice daily for 7 days pending urine culture results. [CM]      ED Course User Index  [CM] Seth Martinez MD                                           Medical Decision Making  Amount and/or Complexity of Data Reviewed  Labs: ordered.        Final diagnoses:   Psychosis, unspecified psychosis type       ED Disposition  ED Disposition       ED Disposition   DC/Transfer to Behavioral Health    Condition   Stable    Comment   --               Please note that portions of this note were completed with a voice recognition program.                Seth Martinez MD  06/03/23 5111

## 2023-06-04 PROCEDURE — 99223 1ST HOSP IP/OBS HIGH 75: CPT | Performed by: PSYCHIATRY & NEUROLOGY

## 2023-06-04 RX ORDER — TRAZODONE HYDROCHLORIDE 50 MG/1
100 TABLET ORAL NIGHTLY PRN
Status: DISCONTINUED | OUTPATIENT
Start: 2023-06-04 | End: 2023-06-10 | Stop reason: HOSPADM

## 2023-06-04 RX ADMIN — TRAZODONE HYDROCHLORIDE 100 MG: 50 TABLET ORAL at 20:29

## 2023-06-04 RX ADMIN — HYDROXYZINE HYDROCHLORIDE 50 MG: 50 TABLET ORAL at 20:29

## 2023-06-04 RX ADMIN — NICOTINE TRANSDERMAL SYSTEM 1 PATCH: 21 PATCH, EXTENDED RELEASE TRANSDERMAL at 08:13

## 2023-06-04 RX ADMIN — MELOXICAM 7.5 MG: 7.5 TABLET ORAL at 08:13

## 2023-06-04 RX ADMIN — FLUOXETINE HYDROCHLORIDE 20 MG: 20 CAPSULE ORAL at 08:13

## 2023-06-04 RX ADMIN — CEFDINIR 300 MG: 300 CAPSULE ORAL at 08:13

## 2023-06-04 RX ADMIN — HYDROXYZINE HYDROCHLORIDE 50 MG: 50 TABLET ORAL at 14:05

## 2023-06-04 RX ADMIN — CEFDINIR 300 MG: 300 CAPSULE ORAL at 20:29

## 2023-06-04 RX ADMIN — BUPRENORPHINE HYDROCHLORIDE AND NALOXONE HYDROCHLORIDE DIHYDRATE 2 TABLET: 8; 2 TABLET SUBLINGUAL at 08:13

## 2023-06-04 RX ADMIN — HYDROXYZINE HYDROCHLORIDE 50 MG: 50 TABLET ORAL at 08:15

## 2023-06-04 RX ADMIN — OLANZAPINE 15 MG: 10 TABLET, FILM COATED ORAL at 20:29

## 2023-06-04 RX ADMIN — CETIRIZINE HYDROCHLORIDE 10 MG: 10 TABLET, FILM COATED ORAL at 08:13

## 2023-06-04 NOTE — PLAN OF CARE
Goal Outcome Evaluation:  Plan of Care Reviewed With: patient  Patient Agreement with Plan of Care: agrees     Progress: improving     Outcome Evaluation: Patient cooperative, interacting with staff and peer, noted guarded, restless at times .  Patient denies suicidal or homicidal ideation
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
## 2023-06-05 LAB — BACTERIA SPEC AEROBE CULT: ABNORMAL

## 2023-06-05 PROCEDURE — 99232 SBSQ HOSP IP/OBS MODERATE 35: CPT | Performed by: PSYCHIATRY & NEUROLOGY

## 2023-06-05 RX ADMIN — BUPRENORPHINE HYDROCHLORIDE AND NALOXONE HYDROCHLORIDE DIHYDRATE 2 TABLET: 8; 2 TABLET SUBLINGUAL at 08:06

## 2023-06-05 RX ADMIN — TRAZODONE HYDROCHLORIDE 100 MG: 50 TABLET ORAL at 20:33

## 2023-06-05 RX ADMIN — CEFDINIR 300 MG: 300 CAPSULE ORAL at 08:06

## 2023-06-05 RX ADMIN — NICOTINE TRANSDERMAL SYSTEM 1 PATCH: 21 PATCH, EXTENDED RELEASE TRANSDERMAL at 08:09

## 2023-06-05 RX ADMIN — CEFDINIR 300 MG: 300 CAPSULE ORAL at 20:33

## 2023-06-05 RX ADMIN — HYDROXYZINE HYDROCHLORIDE 50 MG: 50 TABLET ORAL at 08:08

## 2023-06-05 RX ADMIN — HYDROXYZINE HYDROCHLORIDE 50 MG: 50 TABLET ORAL at 20:34

## 2023-06-05 RX ADMIN — OLANZAPINE 15 MG: 10 TABLET, FILM COATED ORAL at 20:32

## 2023-06-05 RX ADMIN — HYDROXYZINE HYDROCHLORIDE 50 MG: 50 TABLET ORAL at 02:33

## 2023-06-05 RX ADMIN — IBUPROFEN 400 MG: 400 TABLET, FILM COATED ORAL at 14:50

## 2023-06-05 RX ADMIN — CETIRIZINE HYDROCHLORIDE 10 MG: 10 TABLET, FILM COATED ORAL at 08:06

## 2023-06-05 RX ADMIN — MELOXICAM 7.5 MG: 7.5 TABLET ORAL at 08:06

## 2023-06-05 RX ADMIN — FLUOXETINE HYDROCHLORIDE 20 MG: 20 CAPSULE ORAL at 08:06

## 2023-06-05 NOTE — PLAN OF CARE
Goal Outcome Evaluation:  Plan of Care Reviewed With: patient  Patient Agreement with Plan of Care: agrees     Progress: improving  Outcome Evaluation: Pt was calm and cooperative and had no other issues or concerns.

## 2023-06-05 NOTE — PLAN OF CARE
Goal Outcome Evaluation:  Plan of Care Reviewed With: patient  Patient Agreement with Plan of Care: agrees     Progress: improving  Outcome Evaluation: pt. rates anxiety /depression 7 denies s/i denies h/i she verbalzies having hallucinations pt. is cooperative .
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
## 2023-06-05 NOTE — CONSULTS
Visited with Anna this morning. She wanted prayer so we prayed together and she had received a Bible last night. Will follow as needed.

## 2023-06-06 PROCEDURE — 99232 SBSQ HOSP IP/OBS MODERATE 35: CPT | Performed by: PSYCHIATRY & NEUROLOGY

## 2023-06-06 RX ORDER — POLYETHYLENE GLYCOL 3350 17 G/17G
17 POWDER, FOR SOLUTION ORAL DAILY
Status: DISCONTINUED | OUTPATIENT
Start: 2023-06-07 | End: 2023-06-07

## 2023-06-06 RX ORDER — OLANZAPINE 10 MG/1
20 TABLET ORAL NIGHTLY
Status: DISCONTINUED | OUTPATIENT
Start: 2023-06-06 | End: 2023-06-10 | Stop reason: HOSPADM

## 2023-06-06 RX ORDER — FLUOXETINE HYDROCHLORIDE 20 MG/1
40 CAPSULE ORAL DAILY
Status: DISCONTINUED | OUTPATIENT
Start: 2023-06-07 | End: 2023-06-10 | Stop reason: HOSPADM

## 2023-06-06 RX ADMIN — TRAZODONE HYDROCHLORIDE 100 MG: 50 TABLET ORAL at 20:23

## 2023-06-06 RX ADMIN — OLANZAPINE 20 MG: 10 TABLET, FILM COATED ORAL at 20:23

## 2023-06-06 RX ADMIN — HYDROXYZINE HYDROCHLORIDE 50 MG: 50 TABLET ORAL at 11:21

## 2023-06-06 RX ADMIN — MELOXICAM 7.5 MG: 7.5 TABLET ORAL at 11:21

## 2023-06-06 RX ADMIN — CETIRIZINE HYDROCHLORIDE 10 MG: 10 TABLET, FILM COATED ORAL at 08:34

## 2023-06-06 RX ADMIN — BUPRENORPHINE HYDROCHLORIDE AND NALOXONE HYDROCHLORIDE DIHYDRATE 2 TABLET: 8; 2 TABLET SUBLINGUAL at 08:34

## 2023-06-06 RX ADMIN — IBUPROFEN 400 MG: 400 TABLET, FILM COATED ORAL at 11:21

## 2023-06-06 RX ADMIN — IBUPROFEN 400 MG: 400 TABLET, FILM COATED ORAL at 20:23

## 2023-06-06 RX ADMIN — HYDROXYZINE HYDROCHLORIDE 50 MG: 50 TABLET ORAL at 20:23

## 2023-06-06 RX ADMIN — CEFDINIR 300 MG: 300 CAPSULE ORAL at 22:08

## 2023-06-06 RX ADMIN — FLUOXETINE HYDROCHLORIDE 20 MG: 20 CAPSULE ORAL at 08:34

## 2023-06-06 RX ADMIN — NICOTINE TRANSDERMAL SYSTEM 1 PATCH: 21 PATCH, EXTENDED RELEASE TRANSDERMAL at 08:34

## 2023-06-06 RX ADMIN — IBUPROFEN 400 MG: 400 TABLET, FILM COATED ORAL at 05:21

## 2023-06-06 RX ADMIN — CEFDINIR 300 MG: 300 CAPSULE ORAL at 08:34

## 2023-06-06 RX ADMIN — HYDROXYZINE HYDROCHLORIDE 50 MG: 50 TABLET ORAL at 05:21

## 2023-06-06 NOTE — PLAN OF CARE
Goal Outcome Evaluation:  Plan of Care Reviewed With: patient  Patient Agreement with Plan of Care: agrees     Progress: improving  Outcome Evaluation: Patient cooperative. Rested well this shift, interacted appropriately. No acute s/s of distress noted.

## 2023-06-06 NOTE — PLAN OF CARE
Goal Outcome Evaluation:  Plan of Care Reviewed With: patient  Patient Agreement with Plan of Care: agrees     Progress: improving  Outcome Evaluation: PT. RATES ANXIETY 7 RATES DEPRESSION 9  SHE VERBALZIES HAVING HALLUCINATIONS HEARING VOICES & SEEING SHE VERBALIZES FEELING PARANOID GUARDED MANNER  DENIES C/W SOME INTERACTION NOTED WITH PEERS.

## 2023-06-06 NOTE — PROGRESS NOTES
"INPATIENT PSYCHIATRIC PROGRESS NOTE    Name:  Anna Moore  :  1970  MRN:  [id number]  Visit Number:  [id number]  Length of stay:  3    SUBJECTIVE    CC/Focus of Exam: Psychosis, concern for safety    INTERVAL HISTORY:  Patient reports no significant changes today. Appears anxious, paranoia, delusional. AH persist, hearing them around the hospital.     Depression rating 5/10  Anxiety rating 9/10  Sleep: Improving  Withdrawal sx: Denied  Cravin/10    Review of Systems   Constitutional: Negative.    Respiratory: Negative.     Cardiovascular: Negative.    Gastrointestinal: Negative.    Musculoskeletal: Negative.    Psychiatric/Behavioral:  Positive for dysphoric mood and hallucinations. The patient is nervous/anxious.      OBJECTIVE    Temp:  [97.2 °F (36.2 °C)-97.8 °F (36.6 °C)] 97.2 °F (36.2 °C)  Heart Rate:  [60-73] 72  Resp:  [18] 18  BP: (121-163)/(58-77) 163/74    MENTAL STATUS EXAM:  Appearance: Casually dressed, good hygeine.   Cooperation: Cooperative  Psychomotor: No psychomotor agitation/retardation, No EPS, No motor tics  Speech: normal rate, amount.  Mood: \"Anxious, paranoid\"   Affect: congruent, anxious  Thought Content: goal directed, delusional material present  Thought process: linear, organized.  Suicidality: No SI  Homicidality: No HI  Perception: + AH/-VH  Insight: fair   Judgment: fair    Lab Results (last 24 hours)       ** No results found for the last 24 hours. **               Imaging Results (Last 24 Hours)       ** No results found for the last 24 hours. **               ECG/EMG Results (most recent)       Procedure Component Value Units Date/Time    ECG 12 Lead Other [[id number]] Collected: 23 225     Updated: 23 2332     QT Interval 402 ms      QTC Interval 421 ms     Narrative:      Test Reason : Baseline Cardiac Status~  Blood Pressure :   */*   mmHG  Vent. Rate :  66 BPM     Atrial Rate :  66 BPM     P-R Int : 112 ms          QRS Dur :  78 ms      QT Int : 402 ms  "      P-R-T Axes :  27  47  44 degrees     QTc Int : 421 ms    Normal sinus rhythm  Septal infarct , age undetermined  Abnormal ECG  Confirmed by Albert Chatman ([id number]) on 6/3/2023 11:32:45 PM    Referred By:            Confirmed By: Albert Chatman             ALLERGIES: Patient has no known allergies.      Current Facility-Administered Medications:     acetaminophen (TYLENOL) tablet 650 mg, 650 mg, Oral, Q6H PRN, Tee Potter MD    aluminum-magnesium hydroxide-simethicone (MAALOX MAX) 400-400-40 MG/5ML suspension 15 mL, 15 mL, Oral, Q6H PRN, Tee Potter MD    benzonatate (TESSALON) capsule 100 mg, 100 mg, Oral, TID PRN, Tee Potter MD    benztropine (COGENTIN) tablet 2 mg, 2 mg, Oral, Once PRN **OR** benztropine (COGENTIN) injection 1 mg, 1 mg, Intramuscular, Once PRN, Tee Potter MD    buprenorphine-naloxone (SUBOXONE) 8-2 MG per SL tablet 2 tablet, 2 tablet, Sublingual, Daily, Tee Potter MD, 2 tablet at 06/05/23 0806    cefdinir (OMNICEF) capsule 300 mg, 300 mg, Oral, Q12H, Tee Potter MD, 300 mg at 06/05/23 2033    cetirizine (zyrTEC) tablet 10 mg, 10 mg, Oral, Daily, Tee Potter MD, 10 mg at 06/05/23 0806    famotidine (PEPCID) tablet 20 mg, 20 mg, Oral, BID PRN, Tee Potter MD    FLUoxetine (PROzac) capsule 20 mg, 20 mg, Oral, Daily, Tee Potter MD, 20 mg at 06/05/23 0806    hydrOXYzine (ATARAX) tablet 50 mg, 50 mg, Oral, Q6H PRN, Tee Potter MD, 50 mg at 06/06/23 0521    ibuprofen (ADVIL,MOTRIN) tablet 400 mg, 400 mg, Oral, Q6H PRN, Tee Potter MD, 400 mg at 06/06/23 0521    loperamide (IMODIUM) capsule 2 mg, 2 mg, Oral, Q2H PRN, Tee Potter MD    magnesium hydroxide (MILK OF MAGNESIA) suspension 10 mL, 10 mL, Oral, Daily PRN, Tee Potter MD    meloxicam (MOBIC) tablet 7.5 mg, 7.5 mg, Oral, Daily, Tee Potter MD, 7.5 mg at 06/05/23 0806    nicotine (NICODERM CQ) 21 MG/24HR patch 1 patch, 1 patch, Transdermal, Q24H, Tee Potter MD, 1  patch at 06/05/23 0809    OLANZapine (zyPREXA) tablet 15 mg, 15 mg, Oral, Nightly, Tee Potter MD, 15 mg at 06/05/23 2032    ondansetron (ZOFRAN) tablet 4 mg, 4 mg, Oral, Q6H PRN, Tee Potter MD    sodium chloride nasal spray 2 spray, 2 spray, Each Nare, PRN, Tee Potter MD    traZODone (DESYREL) tablet 100 mg, 100 mg, Oral, Nightly PRN, Tee Potter MD, 100 mg at 06/05/23 2033    Reviewed chart, notes, vitals, labs and EKG personally reviewed.    ASSESSMENT & PLAN:    Bipolar 1 disorder, severe, recurrent, with psychosis  -Psychotic symptoms may be a fixed delusion that could be difficult to treat with medication.  Patient is still reporting auditory hallucinations however  -Increase olanzapine to 20mg nightly  -We will establish outpatient psychiatric care following hospitalization     Post Traumatic Stress Disorder  -Meds as above  -Increase fluoxetine to 40 mg daily  -OP care as above. Refer for trauma focused therapy     Opioid use disorder, severe, dependence, on maintenance medication  -Admission UDS positive for buprenorphine  -Continue Suboxone 16 mg daily  -We will refer for substance abuse treatment following hospitalization     Urinary tract infection  -UA with 3-5 RBC, numerous WBC, positive nitrite  -Culture growing E. coli  -Continue Omnicef started in the ED      The patient has been admitted for safety and stabilization.  Patient will be monitored for suicidality daily and maintained on Special Precautions Level 3 (q15 min checks) .  The patient will have individual and group therapy with a master's level therapist. A master treatment plan will be developed and agreed upon by the patient and his/her treatment team. The patient's estimated length of stay in the hospital is 3-5 days.       Special precautions: Special Precautions Level 3 (q15 min checks)     Behavioral Health Treatment Plan and Problem List: I have reviewed and approved the Behavioral Health Treatment Plan and  Problem list.  The patient has had a chance to review and agrees with the treatment plan.     Clinician:  Tee Potter MD  06/06/23  08:10 EDT

## 2023-06-06 NOTE — PLAN OF CARE
Goal Outcome Evaluation:  Plan of Care Reviewed With: patient  Patient Agreement with Plan of Care: agrees  Consent Given to Review Plan with: son  Progress: improving  Outcome Evaluation: Therapist met with Patient to review treatment progress and aftercare recommendations; Patient agreeable.      Problem: Adult Behavioral Health Plan of Care  Goal: Plan of Care Review  Outcome: Ongoing, Progressing  Flowsheets  Taken 6/6/2023 1110  Progress: improving  Plan of Care Reviewed With: patient  Patient Agreement with Plan of Care: agrees  Outcome Evaluation:   Therapist met with Patient to review treatment progress and aftercare recommendations   Patient agreeable.  Taken 6/5/2023 1137  Consent Given to Review Plan with: son  Goal: Optimized Coping Skills in Response to Life Stressors  Outcome: Ongoing, Progressing  Flowsheets (Taken 6/6/2023 1110)  Optimized Coping Skills in Response to Life Stressors: making progress toward outcome  Intervention: Promote Effective Coping Strategies  Flowsheets (Taken 6/6/2023 1110)  Supportive Measures:   active listening utilized   counseling provided   verbalization of feelings encouraged  Goal: Develops/Participates in Therapeutic [place] to Support Successful Transition  Outcome: Ongoing, Progressing  Flowsheets (Taken 6/6/2023 1110)  Develops/Participates in Therapeutic [place] to Support Successful Transition: making progress toward outcome  Intervention: Foster Therapeutic [place]  Flowsheets (Taken 6/6/2023 1110)  Trust Relationship/Rapport:   care explained   reassurance provided   choices provided   thoughts/feelings acknowledged   emotional support provided   empathic listening provided   questions answered   questions encouraged     DATA: Therapist met with Patient individually this date. Patient agreeable to discuss current treatment progress and discharge concerns.    CLINICAL MANUVERING/INTERVENTIONS:  Assisted Patient in processing session content; acknowledged and  normalized Patient’s thoughts, feelings, and concerns by utilizing a person-centered approach in efforts to build appropriate rapport and a positive therapeutic relationship with open and honest communication. Allowed Patient to ventilate regarding current stressors and triggers for negative emotions and thoughts in a safe nonjudgmental environment with unconditional positive regard, active listening skills, and empathy.    ASSESSMENT: Patient was seen 1-1 for a follow up today. Patient continues to receive treatment for psychosis. Patient reports that her symptoms remain the same today. Her biggest complaint is hearing voices. Today we discussed distress tolerance. Patient was able to verbalize things that she can do to distract herself from the voices when in distress such as talking to her son, listening to music, and watching a movie. Patient states that she tries to get her son to go fishing and camping with her but he always declines. She is hopeful that he will be more understanding of her need for distractions and enjoyment in the future.       PLAN:   Patient will continue stabilization. Patient will continue to receive services offered by Treatment Team.     Patient will follow-up with [facility] at [facility].

## 2023-06-07 PROCEDURE — 99232 SBSQ HOSP IP/OBS MODERATE 35: CPT | Performed by: PSYCHIATRY & NEUROLOGY

## 2023-06-07 RX ORDER — QUETIAPINE FUMARATE 100 MG/1
50 TABLET, FILM COATED ORAL NIGHTLY
Status: DISCONTINUED | OUTPATIENT
Start: 2023-06-07 | End: 2023-06-08

## 2023-06-07 RX ORDER — POLYETHYLENE GLYCOL 3350 17 G/17G
17 POWDER, FOR SOLUTION ORAL DAILY
Status: DISCONTINUED | OUTPATIENT
Start: 2023-06-07 | End: 2023-06-10 | Stop reason: HOSPADM

## 2023-06-07 RX ORDER — AMOXICILLIN 250 MG
1 CAPSULE ORAL 2 TIMES DAILY
Status: DISCONTINUED | OUTPATIENT
Start: 2023-06-07 | End: 2023-06-10 | Stop reason: HOSPADM

## 2023-06-07 RX ADMIN — IBUPROFEN 400 MG: 400 TABLET, FILM COATED ORAL at 23:29

## 2023-06-07 RX ADMIN — CEFDINIR 300 MG: 300 CAPSULE ORAL at 20:15

## 2023-06-07 RX ADMIN — QUETIAPINE FUMARATE 50 MG: 100 TABLET ORAL at 20:15

## 2023-06-07 RX ADMIN — OLANZAPINE 20 MG: 10 TABLET, FILM COATED ORAL at 20:15

## 2023-06-07 RX ADMIN — BUPRENORPHINE HYDROCHLORIDE AND NALOXONE HYDROCHLORIDE DIHYDRATE 2 TABLET: 8; 2 TABLET SUBLINGUAL at 08:34

## 2023-06-07 RX ADMIN — HYDROXYZINE HYDROCHLORIDE 50 MG: 50 TABLET ORAL at 23:29

## 2023-06-07 RX ADMIN — HYDROXYZINE HYDROCHLORIDE 50 MG: 50 TABLET ORAL at 17:20

## 2023-06-07 RX ADMIN — HYDROXYZINE HYDROCHLORIDE 50 MG: 50 TABLET ORAL at 03:05

## 2023-06-07 RX ADMIN — POLYETHYLENE GLYCOL 3350 17 G: 17 POWDER, FOR SOLUTION ORAL at 05:06

## 2023-06-07 RX ADMIN — CEFDINIR 300 MG: 300 CAPSULE ORAL at 08:34

## 2023-06-07 RX ADMIN — CETIRIZINE HYDROCHLORIDE 10 MG: 10 TABLET, FILM COATED ORAL at 08:34

## 2023-06-07 RX ADMIN — HYDROXYZINE HYDROCHLORIDE 50 MG: 50 TABLET ORAL at 08:34

## 2023-06-07 RX ADMIN — FLUOXETINE HYDROCHLORIDE 40 MG: 20 CAPSULE ORAL at 08:34

## 2023-06-07 RX ADMIN — IBUPROFEN 400 MG: 400 TABLET, FILM COATED ORAL at 14:33

## 2023-06-07 RX ADMIN — NICOTINE TRANSDERMAL SYSTEM 1 PATCH: 21 PATCH, EXTENDED RELEASE TRANSDERMAL at 08:34

## 2023-06-07 RX ADMIN — DOCUSATE SODIUM 50 MG AND SENNOSIDES 8.6 MG 1 TABLET: 8.6; 5 TABLET, FILM COATED ORAL at 09:26

## 2023-06-07 RX ADMIN — DOCUSATE SODIUM 50 MG AND SENNOSIDES 8.6 MG 1 TABLET: 8.6; 5 TABLET, FILM COATED ORAL at 20:15

## 2023-06-07 NOTE — DISCHARGE INSTR - APPOINTMENTS
[facility]  [facility] [address] [facility], [facility] [address]  [place] [id number]  [phone]    June 12 2023 at 11:30am.           RT has been scheduled for your [facility] appointment on June 14 2023. They will pick you up around 8:30am that morning.  [facility]  [phone]

## 2023-06-07 NOTE — PLAN OF CARE
Goal Outcome Evaluation:  Plan of Care Reviewed With: patient  Patient Agreement with Plan of Care: agrees     Progress: improving  Outcome Evaluation: Patient cooperative, interacting with staff and peer. Patient denies suicidal or homicidal ideation

## 2023-06-07 NOTE — NURSING NOTE
"Pt c/o feeling \"constipated\", last bm yesterday but small. Per pharmacy mom on backorder. Called Dr. Piña, new orders given.  " Statement Selected

## 2023-06-07 NOTE — PROGRESS NOTES
"INPATIENT PSYCHIATRIC PROGRESS NOTE    Name:  Anna Moore  :  1970  MRN:  [id number]  Visit Number:  [id number]  Length of stay:  4    SUBJECTIVE    CC/Focus of Exam: Psychosis, concern for safety    INTERVAL HISTORY:  Patient reports no significant changes today.  She continues to appear anxious, paranoia, delusional. AH persist throughout the hospital.  She is asking about returning home, despite symptoms persisting/worsening.  She seems distressed by these symptoms.  She reports poor sleep as a result last night.    Depression rating 5/10  Anxiety rating 9/10  Sleep: Poor  Withdrawal sx: Denied  Cravin/10    Review of Systems   Constitutional: Negative.    Respiratory: Negative.     Cardiovascular: Negative.    Gastrointestinal: Negative.    Musculoskeletal: Negative.    Psychiatric/Behavioral:  Positive for dysphoric mood and hallucinations. The patient is nervous/anxious.      OBJECTIVE    Temp:  [97.3 °F (36.3 °C)] 97.3 °F (36.3 °C)  Heart Rate:  [80-97] 97  Resp:  [18] 18  BP: (111-135)/(64-69) 111/69    MENTAL STATUS EXAM:  Appearance: Casually dressed, good hygeine.   Cooperation: Cooperative  Psychomotor: No psychomotor agitation/retardation, No EPS, No motor tics  Speech: normal rate, amount.  Mood: \"Anxious, paranoid\"   Affect: congruent, anxious  Thought Content: goal directed, delusional material present  Thought process: linear, organized.  Suicidality: No SI  Homicidality: No HI  Perception: + AH/-VH  Insight: fair   Judgment: fair    Lab Results (last 24 hours)       ** No results found for the last 24 hours. **               Imaging Results (Last 24 Hours)       ** No results found for the last 24 hours. **               ECG/EMG Results (most recent)       Procedure Component Value Units Date/Time    ECG 12 Lead Other [[id number]] Collected: 23 2251     Updated: 23 2332     QT Interval 402 ms      QTC Interval 421 ms     Narrative:      Test Reason : Baseline Cardiac " Status~  Blood Pressure :   */*   mmHG  Vent. Rate :  66 BPM     Atrial Rate :  66 BPM     P-R Int : 112 ms          QRS Dur :  78 ms      QT Int : 402 ms       P-R-T Axes :  27  47  44 degrees     QTc Int : 421 ms    Normal sinus rhythm  Septal infarct , age undetermined  Abnormal ECG  Confirmed by Albret Chatman ([id number]) on 6/3/2023 11:32:45 PM    Referred By:            Confirmed By: Albret Chatman             ALLERGIES: Patient has no known allergies.      Current Facility-Administered Medications:     acetaminophen (TYLENOL) tablet 650 mg, 650 mg, Oral, Q6H PRN, Tee Potter MD    aluminum-magnesium hydroxide-simethicone (MAALOX MAX) 400-400-40 MG/5ML suspension 15 mL, 15 mL, Oral, Q6H PRN, Tee Potter MD    benzonatate (TESSALON) capsule 100 mg, 100 mg, Oral, TID PRN, Tee Potter MD    benztropine (COGENTIN) tablet 2 mg, 2 mg, Oral, Once PRN **OR** benztropine (COGENTIN) injection 1 mg, 1 mg, Intramuscular, Once PRN, Tee Potter MD    buprenorphine-naloxone (SUBOXONE) 8-2 MG per SL tablet 2 tablet, 2 tablet, Sublingual, Daily, Tee Potter MD, 2 tablet at 06/07/23 0834    cefdinir (OMNICEF) capsule 300 mg, 300 mg, Oral, Q12H, Tee Potter MD, 300 mg at 06/07/23 0834    cetirizine (zyrTEC) tablet 10 mg, 10 mg, Oral, Daily, Tee Potter MD, 10 mg at 06/07/23 0834    famotidine (PEPCID) tablet 20 mg, 20 mg, Oral, BID PRN, Tee Potter MD    FLUoxetine (PROzac) capsule 40 mg, 40 mg, Oral, Daily, Tee Potter MD, 40 mg at 06/07/23 0834    hydrOXYzine (ATARAX) tablet 50 mg, 50 mg, Oral, Q6H PRN, Tee Potter MD, 50 mg at 06/07/23 0834    ibuprofen (ADVIL,MOTRIN) tablet 400 mg, 400 mg, Oral, Q6H PRN, Tee Potter MD, 400 mg at 06/06/23 2023    loperamide (IMODIUM) capsule 2 mg, 2 mg, Oral, Q2H PRN, Tee Potter MD    magnesium hydroxide (MILK OF MAGNESIA) suspension 10 mL, 10 mL, Oral, Daily PRN, Tee Potter MD    nicotine (NICODERM CQ) 21 MG/24HR patch 1  patch, 1 patch, Transdermal, Q24H, Tee Potter MD, 1 patch at 06/07/23 0834    OLANZapine (zyPREXA) tablet 20 mg, 20 mg, Oral, Nightly, Tee Potter MD, 20 mg at 06/06/23 2023    ondansetron (ZOFRAN) tablet 4 mg, 4 mg, Oral, Q6H PRN, Tee Potter MD    polyethylene glycol (MIRALAX) packet 17 g, 17 g, Oral, Daily, Tee Potter MD, 17 g at 06/07/23 0506    QUEtiapine (SEROquel) tablet 50 mg, 50 mg, Oral, Nightly, Tee Potter MD    sennosides-docusate (PERICOLACE) 8.6-50 MG per tablet 1 tablet, 1 tablet, Oral, BID, Tee Potter MD, 1 tablet at 06/07/23 0926    sodium chloride nasal spray 2 spray, 2 spray, Each Nare, PRN, Tee Potter MD    traZODone (DESYREL) tablet 100 mg, 100 mg, Oral, Nightly PRN, Tee Potter MD, 100 mg at 06/06/23 2023    Reviewed chart, notes, vitals, labs and EKG personally reviewed.    ASSESSMENT & PLAN:    Bipolar 1 disorder, severe, recurrent, with psychosis  -Psychotic symptoms may be a fixed delusion that could be difficult to treat with medication.  Patient is still reporting auditory hallucinations however  -Increased olanzapine to 20mg nightly on 6/6/2023  -Begin augmentation with quetiapine 50 mg nightly  -We will establish outpatient psychiatric care following hospitalization     Post Traumatic Stress Disorder  -Meds as above  -Increase fluoxetine to 40 mg daily  -OP care as above. Refer for trauma focused therapy     Opioid use disorder, severe, dependence, on maintenance medication  -Admission UDS positive for buprenorphine  -Continue Suboxone 16 mg daily  -We will refer for substance abuse treatment following hospitalization     Urinary tract infection  -UA with 3-5 RBC, numerous WBC, positive nitrite  -Culture growing E. coli  -Continue Omnicef started in the ED      The patient has been admitted for safety and stabilization.  Patient will be monitored for suicidality daily and maintained on Special Precautions Level 3 (q15 min checks) .  The  patient will have individual and group therapy with a master's level therapist. A master treatment plan will be developed and agreed upon by the patient and his/her treatment team. The patient's estimated length of stay in the hospital is 1-3 days.       Special precautions: Special Precautions Level 3 (q15 min checks)     Behavioral Health Treatment Plan and Problem List: I have reviewed and approved the Behavioral Health Treatment Plan and Problem list.  The patient has had a chance to review and agrees with the treatment plan.     Clinician:  Tee Potter MD  06/07/23  09:31 EDT

## 2023-06-07 NOTE — PLAN OF CARE
Goal Outcome Evaluation:  Plan of Care Reviewed With: patient  Patient Agreement with Plan of Care: agrees  Consent Given to Review Plan with: son  Progress: improving  Outcome Evaluation: Therapist met with Patient to review treatment progress and aftercare plans; Patient agreeable.      Problem: Adult Behavioral Health Plan of Care  Goal: Plan of Care Review  Outcome: Ongoing, Progressing  Flowsheets  Taken 6/7/2023 1006  Progress: improving  Plan of Care Reviewed With: patient  Patient Agreement with Plan of Care: agrees  Outcome Evaluation:   Therapist met with Patient to review treatment progress and aftercare plans   Patient agreeable.  Taken 6/5/2023 1137  Consent Given to Review Plan with: son  Goal: Optimized Coping Skills in Response to Life Stressors  Outcome: Ongoing, Progressing  Flowsheets (Taken 6/7/2023 1006)  Optimized Coping Skills in Response to Life Stressors: making progress toward outcome  Intervention: Promote Effective Coping Strategies  Flowsheets (Taken 6/7/2023 1006)  Supportive Measures:   active listening utilized   counseling provided   goal-setting facilitated   verbalization of feelings encouraged  Goal: Develops/Participates in Therapeutic [place] to Support Successful Transition  Outcome: Ongoing, Progressing  Flowsheets (Taken 6/7/2023 1006)  Develops/Participates in Therapeutic [place] to Support Successful Transition: making progress toward outcome  Intervention: Foster Therapeutic [place]  Flowsheets (Taken 6/7/2023 1006)  Trust Relationship/Rapport:   care explained   reassurance provided   choices provided   thoughts/feelings acknowledged   emotional support provided   empathic listening provided   questions answered   questions encouraged     DATA: Therapist met with Patient individually this date. Patient agreeable to discuss current treatment progress and discharge concerns.     Therapist called Patient's son to update today but was unable to reach him.     Patient  signed consent for [facility].    CLINICAL MANUVERING/INTERVENTIONS:  Assisted Patient in processing session content; acknowledged and normalized Patient’s thoughts, feelings, and concerns by utilizing a person-centered approach in efforts to build appropriate rapport and a positive therapeutic relationship with open and honest communication. Allowed Patient to ventilate regarding current stressors and triggers for negative emotions and thoughts in a safe nonjudgmental environment with unconditional positive regard, active listening skills, and empathy.     ASSESSMENT: Patient was seen 1-1 for a follow up today. Patient reports that she is feeling well today. She continues to report Auditory hallucinations. Despite this she voices that she wants to go home tomorrow. She was encouraged to stay and continue treatment in an effort to prevent her from having the same problems when returning home this time. Patient was encouraged to continue practicing healthy coping and distress tolerance skills. Patient does not seem very enthusiastic about these exercises though.    PLAN:   Patient will continue stabilization. Patient will continue to receive services offered by Treatment Team.     Patient will follow-up with [facility] with [facility].

## 2023-06-07 NOTE — PLAN OF CARE
Goal Outcome Evaluation:  Plan of Care Reviewed With: patient  Patient Agreement with Plan of Care: agrees               Pt states anxiety 7, depression 6. She says she has auditory and visual hallucinations of people. Pt is calm and cooperative with staff, med compliant

## 2023-06-08 PROCEDURE — 99232 SBSQ HOSP IP/OBS MODERATE 35: CPT | Performed by: PSYCHIATRY & NEUROLOGY

## 2023-06-08 RX ORDER — QUETIAPINE FUMARATE 100 MG/1
100 TABLET, FILM COATED ORAL NIGHTLY
Status: DISCONTINUED | OUTPATIENT
Start: 2023-06-08 | End: 2023-06-10 | Stop reason: HOSPADM

## 2023-06-08 RX ORDER — PRAZOSIN HYDROCHLORIDE 1 MG/1
1 CAPSULE ORAL 2 TIMES DAILY PRN
Status: DISCONTINUED | OUTPATIENT
Start: 2023-06-08 | End: 2023-06-10 | Stop reason: HOSPADM

## 2023-06-08 RX ORDER — PRAZOSIN HYDROCHLORIDE 1 MG/1
1 CAPSULE ORAL NIGHTLY
Status: DISCONTINUED | OUTPATIENT
Start: 2023-06-08 | End: 2023-06-10 | Stop reason: HOSPADM

## 2023-06-08 RX ADMIN — NICOTINE TRANSDERMAL SYSTEM 1 PATCH: 21 PATCH, EXTENDED RELEASE TRANSDERMAL at 08:13

## 2023-06-08 RX ADMIN — CEFDINIR 300 MG: 300 CAPSULE ORAL at 08:13

## 2023-06-08 RX ADMIN — IBUPROFEN 400 MG: 400 TABLET, FILM COATED ORAL at 15:00

## 2023-06-08 RX ADMIN — FLUOXETINE HYDROCHLORIDE 40 MG: 20 CAPSULE ORAL at 08:14

## 2023-06-08 RX ADMIN — HYDROXYZINE HYDROCHLORIDE 50 MG: 50 TABLET ORAL at 08:16

## 2023-06-08 RX ADMIN — CETIRIZINE HYDROCHLORIDE 10 MG: 10 TABLET, FILM COATED ORAL at 08:13

## 2023-06-08 RX ADMIN — CEFDINIR 300 MG: 300 CAPSULE ORAL at 20:28

## 2023-06-08 RX ADMIN — QUETIAPINE FUMARATE 100 MG: 100 TABLET ORAL at 20:28

## 2023-06-08 RX ADMIN — HYDROXYZINE HYDROCHLORIDE 50 MG: 50 TABLET ORAL at 15:00

## 2023-06-08 RX ADMIN — BUPRENORPHINE HYDROCHLORIDE AND NALOXONE HYDROCHLORIDE DIHYDRATE 2 TABLET: 8; 2 TABLET SUBLINGUAL at 08:14

## 2023-06-08 RX ADMIN — HYDROXYZINE HYDROCHLORIDE 50 MG: 50 TABLET ORAL at 20:28

## 2023-06-08 RX ADMIN — OLANZAPINE 20 MG: 10 TABLET, FILM COATED ORAL at 20:28

## 2023-06-08 RX ADMIN — DOCUSATE SODIUM 50 MG AND SENNOSIDES 8.6 MG 1 TABLET: 8.6; 5 TABLET, FILM COATED ORAL at 20:28

## 2023-06-08 RX ADMIN — PRAZOSIN HYDROCHLORIDE 1 MG: 1 CAPSULE ORAL at 20:28

## 2023-06-08 NOTE — PROGRESS NOTES
"INPATIENT PSYCHIATRIC PROGRESS NOTE    Name:  Anna Moore  :  1970  MRN:  [id number]  Visit Number:  [id number]  Length of stay:  5    SUBJECTIVE    CC/Focus of Exam: Psychosis, concern for safety    INTERVAL HISTORY:  Patient continues to be highly anxious, paranoid, experiencing delusions of her brother trying to harm her and auditory hallucinations of him and others threatening and harassing her.  Sleep again is worsening, which is what led her back into the hospital in the first place.  She reports increased nighttime anxiety, concern for night terrors/nightmares.      Depression rating 6/10  Anxiety rating 9/10  Sleep: Poor  Withdrawal sx: Denied  Cravin/10    Review of Systems   Constitutional: Negative.    Respiratory: Negative.     Cardiovascular: Negative.    Gastrointestinal: Negative.    Musculoskeletal: Negative.    Psychiatric/Behavioral:  Positive for dysphoric mood, hallucinations and sleep disturbance. The patient is nervous/anxious.      OBJECTIVE    Temp:  [97 °F (36.1 °C)-98.8 °F (37.1 °C)] 97 °F (36.1 °C)  Heart Rate:  [71-93] 93  Resp:  [16-18] 18  BP: (125-153)/(68-85) 153/85    MENTAL STATUS EXAM:  Appearance: Casually dressed, good hygeine.   Cooperation: Cooperative  Psychomotor: No psychomotor agitation/retardation, No EPS, No motor tics  Speech: normal rate, amount.  Mood: \"Not good.  Anxious, paranoid\"   Affect: congruent, anxious  Thought Content: goal directed, delusional material present  Thought process: linear, organized.  Suicidality: No SI  Homicidality: No HI  Perception: + AH/-VH  Insight: fair   Judgment: fair    Lab Results (last 24 hours)       ** No results found for the last 24 hours. **               Imaging Results (Last 24 Hours)       ** No results found for the last 24 hours. **               ECG/EMG Results (most recent)       Procedure Component Value Units Date/Time    ECG 12 Lead Other [[id number]] Collected: 23     Updated: 23     " QT Interval 402 ms      QTC Interval 421 ms     Narrative:      Test Reason : Baseline Cardiac Status~  Blood Pressure :   */*   mmHG  Vent. Rate :  66 BPM     Atrial Rate :  66 BPM     P-R Int : 112 ms          QRS Dur :  78 ms      QT Int : 402 ms       P-R-T Axes :  27  47  44 degrees     QTc Int : 421 ms    Normal sinus rhythm  Septal infarct , age undetermined  Abnormal ECG  Confirmed by Albert Chatman ([id number]) on 6/3/2023 11:32:45 PM    Referred By:            Confirmed By: Albert Chatman             ALLERGIES: Patient has no known allergies.      Current Facility-Administered Medications:     acetaminophen (TYLENOL) tablet 650 mg, 650 mg, Oral, Q6H PRN, Tee Potter MD    aluminum-magnesium hydroxide-simethicone (MAALOX MAX) 400-400-40 MG/5ML suspension 15 mL, 15 mL, Oral, Q6H PRN, Tee Potter MD    benzonatate (TESSALON) capsule 100 mg, 100 mg, Oral, TID PRN, Tee Potter MD    benztropine (COGENTIN) tablet 2 mg, 2 mg, Oral, Once PRN **OR** benztropine (COGENTIN) injection 1 mg, 1 mg, Intramuscular, Once PRN, Tee Potter MD    buprenorphine-naloxone (SUBOXONE) 8-2 MG per SL tablet 2 tablet, 2 tablet, Sublingual, Daily, Tee Potter MD, 2 tablet at 06/08/23 0814    cefdinir (OMNICEF) capsule 300 mg, 300 mg, Oral, Q12H, Tee Potter MD, 300 mg at 06/08/23 0813    cetirizine (zyrTEC) tablet 10 mg, 10 mg, Oral, Daily, Tee Potter MD, 10 mg at 06/08/23 0813    famotidine (PEPCID) tablet 20 mg, 20 mg, Oral, BID PRN, Tee Potter MD    FLUoxetine (PROzac) capsule 40 mg, 40 mg, Oral, Daily, Tee Potter MD, 40 mg at 06/08/23 0814    hydrOXYzine (ATARAX) tablet 50 mg, 50 mg, Oral, Q6H PRN, Tee Potter MD, 50 mg at 06/08/23 0816    ibuprofen (ADVIL,MOTRIN) tablet 400 mg, 400 mg, Oral, Q6H PRN, Tee Potter MD, 400 mg at 06/07/23 2329    loperamide (IMODIUM) capsule 2 mg, 2 mg, Oral, Q2H PRN, Tee Potter MD    magnesium hydroxide (MILK OF MAGNESIA) suspension 10 mL,  10 mL, Oral, Daily PRN, Tee Potter MD    nicotine (NICODERM CQ) 21 MG/24HR patch 1 patch, 1 patch, Transdermal, Q24H, Tee Potter MD, 1 patch at 06/08/23 0813    OLANZapine (zyPREXA) tablet 20 mg, 20 mg, Oral, Nightly, Tee Potter MD, 20 mg at 06/07/23 2015    ondansetron (ZOFRAN) tablet 4 mg, 4 mg, Oral, Q6H PRN, Tee Potter MD    polyethylene glycol (MIRALAX) packet 17 g, 17 g, Oral, Daily, Tee Potter MD, 17 g at 06/07/23 0506    prazosin (MINIPRESS) capsule 1 mg, 1 mg, Oral, Nightly, Tee Potter MD    prazosin (MINIPRESS) capsule 1 mg, 1 mg, Oral, BID PRN, Tee Potter MD    QUEtiapine (SEROquel) tablet 100 mg, 100 mg, Oral, Nightly, Tee Potter MD    sennosides-docusate (PERICOLACE) 8.6-50 MG per tablet 1 tablet, 1 tablet, Oral, BID, Tee Potter MD, 1 tablet at 06/07/23 2015    sodium chloride nasal spray 2 spray, 2 spray, Each Nare, PRN, Tee Potter MD    traZODone (DESYREL) tablet 100 mg, 100 mg, Oral, Nightly PRN, Tee Potter MD, 100 mg at 06/06/23 2023    Reviewed chart, notes, vitals, labs and EKG personally reviewed.    ASSESSMENT & PLAN:    Bipolar 1 disorder, severe, recurrent, with psychosis  -Psychotic symptoms may be a fixed delusion that could be difficult to treat with medication.  Patient is still reporting auditory hallucinations however  -Increased olanzapine to 20mg nightly on 6/6/2023  -Increase quetiapine to 100 mg nightly  -We will establish outpatient psychiatric care following hospitalization     Post Traumatic Stress Disorder  -Meds as above  -Increased fluoxetine to 40 mg daily on 6/7/2023  -Begin prazosin 1 mg scheduled nightly and twice daily as needed for acute anxiety  -OP care as above. Refer for trauma focused therapy     Opioid use disorder, severe, dependence, on maintenance medication  -Admission UDS positive for buprenorphine  -Continue Suboxone 16 mg daily  -We will refer for substance abuse treatment following  hospitalization     Urinary tract infection  -UA with 3-5 RBC, numerous WBC, positive nitrite  -Culture growing E. coli  -Continue Omnicef started in the ED      The patient has been admitted for safety and stabilization.  Patient will be monitored for suicidality daily and maintained on Special Precautions Level 3 (q15 min checks) .  The patient will have individual and group therapy with a master's level therapist. A master treatment plan will be developed and agreed upon by the patient and his/her treatment team. The patient's estimated length of stay in the hospital is 4-5 days.     Special precautions: Special Precautions Level 3 (q15 min checks)     Behavioral Health Treatment Plan and Problem List: I have reviewed and approved the Behavioral Health Treatment Plan and Problem list.  The patient has had a chance to review and agrees with the treatment plan.     Clinician:  Tee Potter MD  06/08/23  08:50 EDT

## 2023-06-08 NOTE — PLAN OF CARE
"Goal Outcome Evaluation:  Plan of Care Reviewed With: patient  Patient Agreement with Plan of Care: agrees        Outcome Evaluation: Patient calm and cooperative.  Denied SI, HI. Reports AH of \"him trying to shoot me and her telling him not to\".         "

## 2023-06-08 NOTE — PLAN OF CARE
Goal Outcome Evaluation:  Plan of Care Reviewed With: patient  Patient Agreement with Plan of Care: agrees  Consent Given to Review Plan with: son  Progress: improving  Outcome Evaluation: Therapist met with Patient to review treatment progress and aftercare plans; Patient agreeable.      Problem: Adult Behavioral Health Plan of Care  Goal: Plan of Care Review  Outcome: Ongoing, Progressing  Flowsheets  Taken 6/8/2023 1009 by Olivia Funes MSW  Plan of Care Reviewed With: patient  Patient Agreement with Plan of Care: agrees  Outcome Evaluation:   Therapist met with Patient to review treatment progress and aftercare plans   Patient agreeable.  Taken 6/7/2023 1631 by Ayaka Mckeon RN  Progress: improving  Taken 6/5/2023 1137 by Olivia Funes MSW  Consent Given to Review Plan with: son  Goal: Optimized Coping Skills in Response to Life Stressors  Outcome: Ongoing, Progressing  Flowsheets (Taken 6/8/2023 1009)  Optimized Coping Skills in Response to Life Stressors: making progress toward outcome  Intervention: Promote Effective Coping Strategies  Flowsheets (Taken 6/8/2023 1009)  Supportive Measures:   active listening utilized   counseling provided   goal-setting facilitated   verbalization of feelings encouraged  Goal: Develops/Participates in Therapeutic [place] to Support Successful Transition  Outcome: Ongoing, Progressing  Flowsheets (Taken 6/8/2023 1009)  Develops/Participates in Therapeutic [place] to Support Successful Transition: making progress toward outcome  Intervention: Foster Therapeutic [place]  Flowsheets (Taken 6/8/2023 1009)  Trust Relationship/Rapport:   care explained   reassurance provided   choices provided   thoughts/feelings acknowledged   emotional support provided   empathic listening provided   questions answered   questions encouraged     DATA: Therapist met with Patient individually this date. Patient agreeable to discuss current treatment progress and discharge concerns.      CLINICAL MANUVERING/INTERVENTIONS:  Assisted Patient in processing session content; acknowledged and normalized Patient’s thoughts, feelings, and concerns by utilizing a person-centered approach in efforts to build appropriate rapport and a positive therapeutic relationship with open and honest communication. Allowed Patient to ventilate regarding current stressors and triggers for negative emotions and thoughts in a safe nonjudgmental environment with unconditional positive regard, active listening skills, and empathy.     ASSESSMENT: Patient was seen 1-1 for a follow up today. Patient continues to receive treatment for psychosis. Patient reports that she continues to experience auditory hallucinations, and feel paranoid at times. Patient reports that this makes her very anxious. She is hopeful that medication changes will be helpful to her. We continue to discuss therapeutic efforts that may be helpful. Patient states that she likes to walk and color. We discussed other activities that she could do as well to help her ground herself and distract herself from stressors. Patient continues to be calm, polite, and cooperative.       PLAN:   Patient will continue stabilization. Patient will continue to receive services offered by Treatment Team.     Patient will follow-up with [facility] with [facility].

## 2023-06-08 NOTE — PLAN OF CARE
Goal Outcome Evaluation:  Plan of Care Reviewed With: patient  Patient Agreement with Plan of Care: agrees     Progress: no change  Outcome Evaluation: Patient calm and cooperative. Rates anxiety and depression both a 7. Denies SI/HI or AVH. Patient reports poor sleep. Patient interacts well with staff and peers.

## 2023-06-09 PROCEDURE — 99232 SBSQ HOSP IP/OBS MODERATE 35: CPT | Performed by: PSYCHIATRY & NEUROLOGY

## 2023-06-09 RX ADMIN — DOCUSATE SODIUM 50 MG AND SENNOSIDES 8.6 MG 1 TABLET: 8.6; 5 TABLET, FILM COATED ORAL at 08:06

## 2023-06-09 RX ADMIN — HYDROXYZINE HYDROCHLORIDE 50 MG: 50 TABLET ORAL at 20:49

## 2023-06-09 RX ADMIN — FLUOXETINE HYDROCHLORIDE 40 MG: 20 CAPSULE ORAL at 08:06

## 2023-06-09 RX ADMIN — HYDROXYZINE HYDROCHLORIDE 50 MG: 50 TABLET ORAL at 15:08

## 2023-06-09 RX ADMIN — IBUPROFEN 400 MG: 400 TABLET, FILM COATED ORAL at 15:08

## 2023-06-09 RX ADMIN — CETIRIZINE HYDROCHLORIDE 10 MG: 10 TABLET, FILM COATED ORAL at 08:06

## 2023-06-09 RX ADMIN — CEFDINIR 300 MG: 300 CAPSULE ORAL at 20:49

## 2023-06-09 RX ADMIN — DOCUSATE SODIUM 50 MG AND SENNOSIDES 8.6 MG 1 TABLET: 8.6; 5 TABLET, FILM COATED ORAL at 20:49

## 2023-06-09 RX ADMIN — OLANZAPINE 20 MG: 10 TABLET, FILM COATED ORAL at 20:49

## 2023-06-09 RX ADMIN — PRAZOSIN HYDROCHLORIDE 1 MG: 1 CAPSULE ORAL at 20:49

## 2023-06-09 RX ADMIN — QUETIAPINE FUMARATE 100 MG: 100 TABLET ORAL at 20:49

## 2023-06-09 RX ADMIN — NICOTINE TRANSDERMAL SYSTEM 1 PATCH: 21 PATCH, EXTENDED RELEASE TRANSDERMAL at 08:06

## 2023-06-09 RX ADMIN — CEFDINIR 300 MG: 300 CAPSULE ORAL at 08:05

## 2023-06-09 RX ADMIN — POLYETHYLENE GLYCOL 3350 17 G: 17 POWDER, FOR SOLUTION ORAL at 08:05

## 2023-06-09 RX ADMIN — BUPRENORPHINE HYDROCHLORIDE AND NALOXONE HYDROCHLORIDE DIHYDRATE 2 TABLET: 8; 2 TABLET SUBLINGUAL at 08:06

## 2023-06-09 NOTE — PLAN OF CARE
Goal Outcome Evaluation:  Plan of Care Reviewed With: patient  Patient Agreement with Plan of Care: agrees  Consent Given to Review Plan with: son  Progress: improving  Outcome Evaluation: Therapist met with Patient to review treatment progress and aftercare plans; Patient agreeable.      Problem: Adult Behavioral Health Plan of Care  Goal: Plan of Care Review  Outcome: Ongoing, Progressing  Flowsheets  Taken 6/9/2023 1159  Progress: improving  Plan of Care Reviewed With: patient  Patient Agreement with Plan of Care: agrees  Outcome Evaluation:   Therapist met with Patient to review treatment progress and aftercare plans   Patient agreeable.  Taken 6/5/2023 1137  Consent Given to Review Plan with: son  Goal: Optimized Coping Skills in Response to Life Stressors  Outcome: Ongoing, Progressing  Flowsheets (Taken 6/9/2023 1159)  Optimized Coping Skills in Response to Life Stressors: making progress toward outcome  Intervention: Promote Effective Coping Strategies  Flowsheets (Taken 6/9/2023 1159)  Supportive Measures:   active listening utilized   counseling provided   goal-setting facilitated   verbalization of feelings encouraged  Goal: Develops/Participates in Therapeutic [place] to Support Successful Transition  Outcome: Ongoing, Progressing  Flowsheets (Taken 6/9/2023 1159)  Develops/Participates in Therapeutic [place] to Support Successful Transition: making progress toward outcome  Intervention: Foster Therapeutic [place]  Flowsheets (Taken 6/9/2023 1159)  Trust Relationship/Rapport:   care explained   reassurance provided   choices provided   emotional support provided   thoughts/feelings acknowledged   empathic listening provided   questions answered   questions encouraged     DATA: Therapist met with Patient individually this date. Patient agreeable to discuss current treatment progress and discharge concerns.     This therapist called and spoke with Patient's son, Luigi. He was provided an update on the  Patient. She will return to his home at discharge [address] [place] [address].    CLINICAL MANUVERING/INTERVENTIONS:  Assisted Patient in processing session content; acknowledged and normalized Patient’s thoughts, feelings, and concerns by utilizing a person-centered approach in efforts to build appropriate rapport and a positive therapeutic relationship with open and honest communication. Allowed Patient to ventilate regarding current stressors and triggers for negative emotions and thoughts in a safe nonjudgmental environment with unconditional positive regard, active listening skills, and empathy.    ASSESSMENT: Patient was seen 1-1 for a follow up today. Patient continues to receive treatment for psychosis. Patient feels that her symptoms are improving but she does not want to rush anything because she does not want to readmit here. This therapist offered support and encouragement. She denies any major anxiety or depression. She is social on the unit with others. Patient continues to be calm and cooperative.       PLAN:   Patient will continue stabilization. Patient will continue to receive services offered by Treatment Team.     Patient will follow-up with [facility] with [facility] [place].

## 2023-06-09 NOTE — PROGRESS NOTES
"INPATIENT PSYCHIATRIC PROGRESS NOTE    Name:  Anna Moore  :  1970  MRN:  [id number]  Visit Number:  [id number]  Length of stay:  6    SUBJECTIVE    CC/Focus of Exam: Psychosis    INTERVAL HISTORY:  The patient states she is feeling better and the medications are helping and she was able to sleep better with the new medications. States she still hears some voices but they are not as bothersome as previously.   Depression rating 6/10  Anxiety rating 6/10  Sleep:good  Withdrawal sx: denies  Cravin/10    Review of Systems   Respiratory: Negative.     Cardiovascular: Negative.    Gastrointestinal: Negative.    Musculoskeletal:  Positive for back pain.     OBJECTIVE    Temp:  [97.2 °F (36.2 °C)-97.8 °F (36.6 °C)] 97.2 °F (36.2 °C)  Heart Rate:  [68-78] 78  Resp:  [18] 18  BP: (137-140)/(63-68) 140/63    MENTAL STATUS EXAM:  Appearance:Casually dressed, good hygeine.   Cooperation:Cooperative  Psychomotor: No psychomotor agitation/retardation, No EPS, No motor tics  Speech-normal rate, amount.  Mood \"better\"   Affect-congruent, appropriate, stable  Thought Content-goal directed, no delusional material present  Thought process-linear, organized.  Suicidality: No SI  Homicidality: No HI  Perception: No AH/VH  Insight-fair   Judgement-fair    Lab Results (last 24 hours)       ** No results found for the last 24 hours. **               Imaging Results (Last 24 Hours)       ** No results found for the last 24 hours. **               ECG/EMG Results (most recent)       Procedure Component Value Units Date/Time    ECG 12 Lead Other [[id number]] Collected: 23 2251     Updated: 23 2332     QT Interval 402 ms      QTC Interval 421 ms     Narrative:      Test Reason : Baseline Cardiac Status~  Blood Pressure :   */*   mmHG  Vent. Rate :  66 BPM     Atrial Rate :  66 BPM     P-R Int : 112 ms          QRS Dur :  78 ms      QT Int : 402 ms       P-R-T Axes :  27  47  44 degrees     QTc Int : 421 ms    Normal " sinus rhythm  Septal infarct , age undetermined  Abnormal ECG  Confirmed by Albert Chatman ([id number]) on 6/3/2023 11:32:45 PM    Referred By:            Confirmed By: Albert Chatman             ALLERGIES: Patient has no known allergies.    Medication Review:   Scheduled Medications:  buprenorphine-naloxone, 2 tablet, Sublingual, Daily  cefdinir, 300 mg, Oral, Q12H  cetirizine, 10 mg, Oral, Daily  FLUoxetine, 40 mg, Oral, Daily  nicotine, 1 patch, Transdermal, Q24H  OLANZapine, 20 mg, Oral, Nightly  polyethylene glycol, 17 g, Oral, Daily  prazosin, 1 mg, Oral, Nightly  QUEtiapine, 100 mg, Oral, Nightly  senna-docusate sodium, 1 tablet, Oral, BID         PRN Medications:    acetaminophen    aluminum-magnesium hydroxide-simethicone    benzonatate    benztropine **OR** benztropine    famotidine    hydrOXYzine    ibuprofen    loperamide    magnesium hydroxide    ondansetron    prazosin    sodium chloride    traZODone   All medications reviewed.    ASSESSMENT & PLAN:    Bipolar 1 disorder, severe, recurrent, with psychosis  -Psychotic symptoms may be a fixed delusion that could be difficult to treat with medication.  Patient is still reporting auditory hallucinations however  -Increased olanzapine to 20mg nightly on 6/6/2023  -Increase quetiapine to 100 mg nightly  -Patient reports the medications are helping  -We will establish outpatient psychiatric care following hospitalization     Post Traumatic Stress Disorder  -Meds as above  -Increased fluoxetine to 40 mg daily on 6/7/2023  -Begin prazosin 1 mg scheduled nightly and twice daily as needed for acute anxiety  -OP care as above. Refer for trauma focused therapy     Opioid use disorder, severe, dependence, on maintenance medication  -Admission UDS positive for buprenorphine  -Continue Suboxone 16 mg daily  -We will refer for substance abuse treatment following hospitalization     Urinary tract infection  -UA with 3-5 RBC, numerous WBC, positive nitrite  -Culture growing  E. coli  -Continue Omnicef started in the ED    Special precautions: Special Precautions Level 3 (q15 min checks) .    Behavioral Health Treatment Plan and Problem List: I have reviewed and approved the Behavioral Health Treatment Plan and Problem list.  The patient has had a chance to review and agrees with the treatment plan.    Copied text in portions of this note has been reviewed and is accurate as of 06/09/23         Clinician:  Andres Piña MD  06/09/23  08:43 EDT

## 2023-06-09 NOTE — PLAN OF CARE
Goal Outcome Evaluation:  Plan of Care Reviewed With: patient  Patient Agreement with Plan of Care: agrees     Progress: improving  Outcome Evaluation: no acute events during shift. pt denies SI, HI, and A/V/T hallucinations. pt reports an anxiety level 7/10 & depression level of 7/10. will continue to monitor.

## 2023-06-09 NOTE — PLAN OF CARE
Goal Outcome Evaluation:  Plan of Care Reviewed With: patient  Patient Agreement with Plan of Care: agrees     Progress: no change  Outcome Evaluation: Patient was calm and cooperative during assessment.  She reports anxiety 7 and depression 6.  Denies SI/HI. She does report auditory hallucinations - her brother David and her I[place] telling him not to shoot her. Patient reports not sleeping well but that her appetite is good.

## 2023-06-10 VITALS
WEIGHT: 136.6 LBS | HEART RATE: 75 BPM | DIASTOLIC BLOOD PRESSURE: 78 MMHG | OXYGEN SATURATION: 95 % | SYSTOLIC BLOOD PRESSURE: 117 MMHG | HEIGHT: 63 IN | TEMPERATURE: 97.1 F | RESPIRATION RATE: 18 BRPM | BODY MASS INDEX: 24.2 KG/M2

## 2023-06-10 PROCEDURE — 99238 HOSP IP/OBS DSCHRG MGMT 30/<: CPT | Performed by: PSYCHIATRY & NEUROLOGY

## 2023-06-10 RX ORDER — PRAZOSIN HYDROCHLORIDE 1 MG/1
1 CAPSULE ORAL NIGHTLY
Qty: 30 CAPSULE | Refills: 0 | Status: SHIPPED | OUTPATIENT
Start: 2023-06-10

## 2023-06-10 RX ORDER — NALOXONE HYDROCHLORIDE 4 MG/.1ML
SPRAY NASAL
Qty: 2 EACH | Refills: 0 | Status: SHIPPED | OUTPATIENT
Start: 2023-06-10 | End: 2023-06-10 | Stop reason: SDUPTHER

## 2023-06-10 RX ORDER — QUETIAPINE FUMARATE 100 MG/1
100 TABLET, FILM COATED ORAL NIGHTLY
Qty: 30 TABLET | Refills: 0 | Status: SHIPPED | OUTPATIENT
Start: 2023-06-10

## 2023-06-10 RX ORDER — TRAZODONE HYDROCHLORIDE 100 MG/1
100 TABLET ORAL NIGHTLY PRN
Qty: 30 TABLET | Refills: 0 | Status: SHIPPED | OUTPATIENT
Start: 2023-06-10

## 2023-06-10 RX ORDER — OLANZAPINE 20 MG/1
20 TABLET ORAL NIGHTLY
Qty: 30 TABLET | Refills: 0 | Status: SHIPPED | OUTPATIENT
Start: 2023-06-10

## 2023-06-10 RX ORDER — NALOXONE HYDROCHLORIDE 4 MG/.1ML
SPRAY NASAL
Qty: 2 EACH | Refills: 0 | Status: SHIPPED | OUTPATIENT
Start: 2023-06-10

## 2023-06-10 RX ORDER — FLUOXETINE HYDROCHLORIDE 40 MG/1
40 CAPSULE ORAL DAILY
Qty: 30 CAPSULE | Refills: 0 | Status: SHIPPED | OUTPATIENT
Start: 2023-06-11

## 2023-06-10 RX ORDER — BUPRENORPHINE HYDROCHLORIDE AND NALOXONE HYDROCHLORIDE DIHYDRATE 8; 2 MG/1; MG/1
2 TABLET SUBLINGUAL DAILY
Qty: 6 TABLET | Refills: 0 | Status: SHIPPED | OUTPATIENT
Start: 2023-06-11

## 2023-06-10 RX ADMIN — BUPRENORPHINE HYDROCHLORIDE AND NALOXONE HYDROCHLORIDE DIHYDRATE 2 TABLET: 8; 2 TABLET SUBLINGUAL at 08:43

## 2023-06-10 RX ADMIN — IBUPROFEN 400 MG: 400 TABLET, FILM COATED ORAL at 00:06

## 2023-06-10 RX ADMIN — DOCUSATE SODIUM 50 MG AND SENNOSIDES 8.6 MG 1 TABLET: 8.6; 5 TABLET, FILM COATED ORAL at 08:43

## 2023-06-10 RX ADMIN — FLUOXETINE HYDROCHLORIDE 40 MG: 20 CAPSULE ORAL at 08:43

## 2023-06-10 RX ADMIN — CETIRIZINE HYDROCHLORIDE 10 MG: 10 TABLET, FILM COATED ORAL at 08:43

## 2023-06-10 RX ADMIN — HYDROXYZINE HYDROCHLORIDE 50 MG: 50 TABLET ORAL at 06:27

## 2023-06-10 RX ADMIN — CEFDINIR 300 MG: 300 CAPSULE ORAL at 08:43

## 2023-06-10 RX ADMIN — POLYETHYLENE GLYCOL 3350 17 G: 17 POWDER, FOR SOLUTION ORAL at 08:42

## 2023-06-10 RX ADMIN — NICOTINE TRANSDERMAL SYSTEM 1 PATCH: 21 PATCH, EXTENDED RELEASE TRANSDERMAL at 08:43

## 2023-06-10 RX ADMIN — IBUPROFEN 400 MG: 400 TABLET, FILM COATED ORAL at 06:25

## 2023-06-10 NOTE — DISCHARGE SUMMARY
"      PSYCHIATRIC DISCHARGE SUMMARY     Patient Identification:  Name:  Anna Moore  Age:  53 y.o.  Sex:  female  :  1970  MRN:  [id number]  Visit Number:  [id number]      Date of Admission:6/3/2023   Date of Discharge:  6/10/2023    Discharge Diagnosis:  Bipolar 1 disorder, severe, recurrent, with psychosis  Posttraumatic stress disorder  Opioid use disorder, severe, dependence, on maintenance therapy  UTI      Admission Diagnosis:  Delusion [F22]     Hospital Course  Patient is a 53 y.o. female presented with complaints of manic symptoms with delusions and psychosis as well as auditory hallucinations.      Patient was admitted for crisis stabilization.  Patient was evaluated by psychiatrist on a daily basis and had the opportunity for both group and individual therapy by master's level therapist.  Routine laboratory studies and EKG were performed.    Patient's Zyprexa was increased to 20 mg p.o. nightly for bipolar disorder and Seroquel was increased to 100 mg nightly for bipolar disorder and insomnia as she had failed monotherapy with difficult to control auditory hallucinations.  Prozac was increased to 40 mg p.o. daily for mood symptoms and prazosin was started at night for nightmares and anxiety during the day.  Home Suboxone 16 mg sublingually daily was continued for opioid use disorder.  Patient was found to have a UTI on UA and Omnicef course was initiated.  Over the course of her hospitalization, her symptoms improved and patient tolerated medication changes well without side effects.  She did not have any major mood or anxiety symptoms on day of discharge and adamantly and convincingly denied SI/HI/AVH and no psychotic symptoms, delusions, or hallucinations were noted.    Mental Status Exam upon discharge:   Mood \"Ready to go\" Improved, stable    Affect-congruent, appropriate, stable  Thought Content-goal directed, no delusional material present  Thought process-linear, organized.  Suicidality: " No SI  Homicidality: No HI  Perception: No AH/VH  Insight and Judgement: fair    Procedures Performed         Consults:   Consults       No orders found from 5/5/2023 to 6/4/2023.            Pertinent Test Results:  Lab Results (last 72 hours)       ** No results found for the last 72 hours. **              ECG/EMG Results (most recent)       Procedure Component Value Units Date/Time    ECG 12 Lead Other [[id number]] Collected: 06/03/23 2251     Updated: 06/03/23 2332     QT Interval 402 ms      QTC Interval 421 ms     Narrative:      Test Reason : Baseline Cardiac Status~  Blood Pressure :   */*   mmHG  Vent. Rate :  66 BPM     Atrial Rate :  66 BPM     P-R Int : 112 ms          QRS Dur :  78 ms      QT Int : 402 ms       P-R-T Axes :  27  47  44 degrees     QTc Int : 421 ms    Normal sinus rhythm  Septal infarct , age undetermined  Abnormal ECG  Confirmed by Albert Chatman ([id number]) on 6/3/2023 11:32:45 PM    Referred By:            Confirmed By: Albert Chatman             EKG: normal sinus rhythm.    Condition on Discharge:  improved    Vital Signs  Temp:  [97.1 °F (36.2 °C)-97.9 °F (36.6 °C)] 97.1 °F (36.2 °C)  Heart Rate:  [75-84] 75  Resp:  [18] 18  BP: (117-124)/(61-78) 117/78    Discharge Disposition:  Home or Self Care    Discharge Medications:     Discharge Medications        New Medications        Instructions Start Date   naloxone 4 MG/0.1ML nasal spray  Commonly known as: NARCAN   Call 911. Don't prime. [place] in 1 nostril for overdose. Repeat in 2-3 minutes in other nostril if no or minimal breathing/responsiveness.      prazosin 1 MG capsule  Commonly known as: MINIPRESS   1 mg, Oral, Nightly      QUEtiapine 100 MG tablet  Commonly known as: SEROquel   100 mg, Oral, Nightly             Changes to Medications        Instructions Start Date   FLUoxetine 40 MG capsule  Commonly known as: PROzac  What changed:   medication strength  how much to take   40 mg, Oral, Daily   Start Date: June 11, 2023      OLANZapine 20 MG tablet  Commonly known as: zyPREXA  What changed:   medication strength  how much to take   20 mg, Oral, Nightly      traZODone 100 MG tablet  Commonly known as: DESYREL  What changed:   medication strength  how much to take   100 mg, Oral, Nightly PRN             Continue These Medications        Instructions Start Date   buprenorphine-naloxone 8-2 MG per SL tablet  Commonly known as: SUBOXONE   2 tablets, Sublingual, Daily   Start Date: June 11, 2023     cetirizine 10 MG tablet  Commonly known as: zyrTEC   10 mg, Oral, Daily             Stop These Medications      meloxicam 7.5 MG tablet  Commonly known as: Mobic              Discharge Diet:   Diet Instructions    Regular        Regular    Activity at Discharge:   Activity Instructions    As tolerated        As tolerated    Follow-up Appointments  Future Appointments   Date Time Provider Department Center   6/14/2023  9:00 AM Dust[facility]         Test Results Pending at Discharge   None    I spent a total of 26 minutes face-to-face with the patient regarding discharge planning, evaluation, discussion, and follow-up plans.        Clinician:   Oumar Deutsch MD  06/10/23  17:35 EDT

## 2023-06-10 NOTE — PLAN OF CARE
Goal Outcome Evaluation:  Plan of Care Reviewed With: patient  Patient Agreement with Plan of Care: agrees     Progress: improving  Outcome Evaluation: Patient calm and cooperative. Rates anxiety and depression both a 6. Denies SI/HI or AVH. Patient is being discharged home today.

## 2023-06-10 NOTE — PLAN OF CARE
Goal Outcome Evaluation:  Plan of Care Reviewed With: patient  Patient Agreement with Plan of Care: agrees     Progress: improving  Outcome Evaluation: Patient calm and cooperative, rates anx and dep 2/10, reports fair sleep, denies SI/HI/AVH, rates back pain 5/10, appears to have slept well.

## 2023-06-12 NOTE — PROGRESS NOTES
Behavioral Health Discharge Summary             Please fax within 24 hours of discharge to [facility] at: [phone]      Member Name: Anna Moore Member ID: [id number]   Authorization Number: [id number] Phone: [phone]   Member Address: [address] [place] [address] [place] [address]   Discharge Date: 06/10/2023 Level of Care at Discharge:    Facility: [facility] Staff Completing Form: Amy VALLES   If the member is being discharged directly to a residential or extended care program, please specify the type below.   __Private Child-Caring Facility (PCC) Residential/Group Home   __Private Child-Caring Facility (PCC) Therapeutic Foster Care   __Residential Treatment Facility (RTF)   __Psychiatric Residential Treatment Facility (PRTF I or II)   __Long-Term Acute Inpatient Hospital Services or Extended Care Unit (ECU)   __Other (please specify):    Brief discharge summary of treatment received (for follow up by the case management team): D/C clinical with list of medications and follow up appts given to patient upon discharge.     BRIEF SUMMARY OF RECOMMENDATIONS FOR ONGOING TREATMENT     Discharged to where: Home   Discharge diagnoses: F 22   Axis I:    Axis II:    Axis III:    Axis IV:    Axis V:    Does the member understand his/her DX?  Yes          Medication     Dose     Schedule Supply/  Quantity  Given at Discharge RX Provided  Yes/No  If Rx Provided, Quantity RX Prior Auth Required  Yes/No Prior Auth  Completed   Narcan  4mg/0.1ml nasal spray  Spray in 1 nostril PRN for signs of OD        Prazosin 1 mg  HS         Seroquel  100 mg  HS                                                                    Does the member understand the reason for taking these medications? Yes                                                           FOLLOW-UP APPOINTMENTS   Please schedule within 7 days of discharge and provide appointment details for all referred services.    PCP/Other  Providers Involved in Treatment:    Appointment Type: IOP  Provider Name:   Ann Jessi Provider Phone: [phone] Appointment Date: 06/14/2023 Appointment Time:      Assessment   (new to OP services)        Case Management    Is the member already enrolled in case management?  Yes/No  If yes, date the CM was notified:    If no, was the CM referral offered?  Yes/No  Accepted? Yes/No    Is the Release of Information in the chart? Yes/No:      Medication Management (for member discharged with psychiatric medications):      A&D Treatment (for member with substance abuse/   dependence in the past year):      Medical Condition (for member with a medical condition):    Other recommended treatment:    Do you have any concerns about the discharge plan?  No    If yes, explain:    Was the member involved in the discharge planning?  Yes    If no, explain:    Was a copy of the discharge plan provided to the member?  Yes    If no, explain:

## 2023-06-19 ENCOUNTER — OFFICE VISIT (OUTPATIENT)
Dept: PSYCHIATRY | Facility: CLINIC | Age: 53
End: 2023-06-19
Payer: COMMERCIAL

## 2023-06-19 DIAGNOSIS — F31.9 BIPOLAR 1 DISORDER: Primary | ICD-10-CM

## 2023-06-19 DIAGNOSIS — Z79.899 MEDICATION MANAGEMENT: ICD-10-CM

## 2023-06-19 LAB
EXTERNAL AMPHETAMINE SCREEN URINE: NEGATIVE
EXTERNAL BENZODIAZEPINE SCREEN URINE: NEGATIVE
EXTERNAL BUPRENORPHINE SCREEN URINE: POSITIVE
EXTERNAL COCAINE SCREEN URINE: NEGATIVE
EXTERNAL MDMA: NEGATIVE
EXTERNAL METHADONE SCREEN URINE: NEGATIVE
EXTERNAL METHAMPHETAMINE SCREEN URINE: NEGATIVE
EXTERNAL OPIATES SCREEN URINE: NEGATIVE
EXTERNAL OXYCODONE SCREEN URINE: NEGATIVE
EXTERNAL THC SCREEN URINE: NEGATIVE
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
## 2023-07-27 ENCOUNTER — OFFICE VISIT (OUTPATIENT)
Dept: PSYCHIATRY | Facility: CLINIC | Age: 53
End: 2023-07-27
Payer: COMMERCIAL

## 2023-07-27 VITALS
HEART RATE: 91 BPM | HEIGHT: 63 IN | WEIGHT: 147.2 LBS | DIASTOLIC BLOOD PRESSURE: 60 MMHG | BODY MASS INDEX: 26.08 KG/M2 | SYSTOLIC BLOOD PRESSURE: 116 MMHG

## 2023-07-27 DIAGNOSIS — F31.9 BIPOLAR 1 DISORDER: Primary | ICD-10-CM

## 2023-07-27 DIAGNOSIS — F43.10 POST TRAUMATIC STRESS DISORDER (PTSD): ICD-10-CM

## 2023-07-27 DIAGNOSIS — Z79.899 MEDICATION MANAGEMENT: ICD-10-CM

## 2023-07-27 DIAGNOSIS — F51.5 NIGHTMARES: ICD-10-CM

## 2023-07-27 RX ORDER — OLANZAPINE 20 MG/1
20 TABLET ORAL NIGHTLY
Qty: 30 TABLET | Refills: 1 | Status: SHIPPED | OUTPATIENT
Start: 2023-07-27

## 2023-07-27 RX ORDER — QUETIAPINE FUMARATE 150 MG/1
150 TABLET, FILM COATED ORAL NIGHTLY
Qty: 30 TABLET | Refills: 1 | Status: SHIPPED | OUTPATIENT
Start: 2023-07-27

## 2023-07-27 RX ORDER — PRAZOSIN HYDROCHLORIDE 1 MG/1
1 CAPSULE ORAL NIGHTLY
Qty: 30 CAPSULE | Refills: 1 | Status: SHIPPED | OUTPATIENT
Start: 2023-07-27

## 2023-07-27 RX ORDER — FLUOXETINE HYDROCHLORIDE 40 MG/1
40 CAPSULE ORAL DAILY
Qty: 30 CAPSULE | Refills: 1 | Status: SHIPPED | OUTPATIENT
Start: 2023-07-27

## 2023-08-09 DIAGNOSIS — F43.10 POST TRAUMATIC STRESS DISORDER (PTSD): ICD-10-CM

## 2023-08-09 RX ORDER — TRAZODONE HYDROCHLORIDE 100 MG/1
100 TABLET ORAL NIGHTLY PRN
Qty: 30 TABLET | Refills: 0 | Status: SHIPPED | OUTPATIENT
Start: 2023-08-09

## 2023-12-22 ENCOUNTER — HOSPITAL ENCOUNTER (EMERGENCY)
Facility: HOSPITAL | Age: 53
Discharge: STILL ADMITTED OR ADMINISTRATIVE | DRG: 885 | End: 2023-12-22
Attending: STUDENT IN AN ORGANIZED HEALTH CARE EDUCATION/TRAINING PROGRAM
Payer: COMMERCIAL

## 2023-12-22 ENCOUNTER — HOSPITAL ENCOUNTER (INPATIENT)
Facility: HOSPITAL | Age: 53
LOS: 9 days | Discharge: HOME | DRG: 885 | End: 2023-12-31
Attending: PSYCHIATRY & NEUROLOGY | Admitting: PSYCHIATRY & NEUROLOGY
Payer: COMMERCIAL

## 2023-12-22 VITALS
RESPIRATION RATE: 16 BRPM | WEIGHT: 140 LBS | TEMPERATURE: 97.9 F | SYSTOLIC BLOOD PRESSURE: 142 MMHG | BODY MASS INDEX: 24.8 KG/M2 | OXYGEN SATURATION: 97 % | DIASTOLIC BLOOD PRESSURE: 83 MMHG | HEART RATE: 109 BPM | HEIGHT: 63 IN

## 2023-12-22 DIAGNOSIS — G47.00 INSOMNIA, UNSPECIFIED TYPE: ICD-10-CM

## 2023-12-22 DIAGNOSIS — F29 PSYCHOSIS, UNSPECIFIED PSYCHOSIS TYPE: Primary | ICD-10-CM

## 2023-12-22 DIAGNOSIS — I10 HYPERTENSION, UNSPECIFIED TYPE: ICD-10-CM

## 2023-12-22 PROBLEM — F30.10 MANIC BEHAVIOR: Status: ACTIVE | Noted: 2023-12-22

## 2023-12-22 LAB
ALBUMIN SERPL-MCNC: 4.1 G/DL (ref 3.5–5.2)
ALBUMIN/GLOB SERPL: 1.1 G/DL
ALP SERPL-CCNC: 96 U/L (ref 39–117)
ALT SERPL W P-5'-P-CCNC: 15 U/L (ref 1–33)
AMPHET+METHAMPHET UR QL: NEGATIVE
AMPHETAMINES UR QL: NEGATIVE
ANION GAP SERPL CALCULATED.3IONS-SCNC: 14.2 MMOL/L (ref 5–15)
AST SERPL-CCNC: 18 U/L (ref 1–32)
BACTERIA UR QL AUTO: ABNORMAL /HPF
BARBITURATES UR QL SCN: NEGATIVE
BASOPHILS # BLD AUTO: 0.06 10*3/MM3 (ref 0–0.2)
BASOPHILS NFR BLD AUTO: 0.4 % (ref 0–1.5)
BENZODIAZ UR QL SCN: NEGATIVE
BILIRUB SERPL-MCNC: 0.5 MG/DL (ref 0–1.2)
BILIRUB UR QL STRIP: NEGATIVE
BUN SERPL-MCNC: 7 MG/DL (ref 6–20)
BUN/CREAT SERPL: 8.3 (ref 7–25)
BUPRENORPHINE SERPL-MCNC: POSITIVE NG/ML
CALCIUM SPEC-SCNC: 9.8 MG/DL (ref 8.6–10.5)
CANNABINOIDS SERPL QL: NEGATIVE
CHLORIDE SERPL-SCNC: 97 MMOL/L (ref 98–107)
CLARITY UR: CLEAR
CO2 SERPL-SCNC: 23.8 MMOL/L (ref 22–29)
COCAINE UR QL: NEGATIVE
COLOR UR: YELLOW
CREAT SERPL-MCNC: 0.84 MG/DL (ref 0.57–1)
DEPRECATED RDW RBC AUTO: 44.6 FL (ref 37–54)
EGFRCR SERPLBLD CKD-EPI 2021: 83.7 ML/MIN/1.73
EOSINOPHIL # BLD AUTO: 0.01 10*3/MM3 (ref 0–0.4)
EOSINOPHIL NFR BLD AUTO: 0.1 % (ref 0.3–6.2)
ERYTHROCYTE [DISTWIDTH] IN BLOOD BY AUTOMATED COUNT: 13.2 % (ref 12.3–15.4)
ETHANOL BLD-MCNC: <10 MG/DL (ref 0–10)
ETHANOL UR QL: <0.01 %
FENTANYL UR-MCNC: NEGATIVE NG/ML
GLOBULIN UR ELPH-MCNC: 3.9 GM/DL
GLUCOSE SERPL-MCNC: 157 MG/DL (ref 65–99)
GLUCOSE UR STRIP-MCNC: NEGATIVE MG/DL
HCT VFR BLD AUTO: 42.5 % (ref 34–46.6)
HGB BLD-MCNC: 14.3 G/DL (ref 12–15.9)
HGB UR QL STRIP.AUTO: NEGATIVE
HOLD SPECIMEN: NORMAL
HYALINE CASTS UR QL AUTO: ABNORMAL /LPF
IMM GRANULOCYTES # BLD AUTO: 0.07 10*3/MM3 (ref 0–0.05)
IMM GRANULOCYTES NFR BLD AUTO: 0.5 % (ref 0–0.5)
KETONES UR QL STRIP: NEGATIVE
LEUKOCYTE ESTERASE UR QL STRIP.AUTO: ABNORMAL
LYMPHOCYTES # BLD AUTO: 1.82 10*3/MM3 (ref 0.7–3.1)
LYMPHOCYTES NFR BLD AUTO: 13.4 % (ref 19.6–45.3)
MAGNESIUM SERPL-MCNC: 1.7 MG/DL (ref 1.6–2.6)
MCH RBC QN AUTO: 30.7 PG (ref 26.6–33)
MCHC RBC AUTO-ENTMCNC: 33.6 G/DL (ref 31.5–35.7)
MCV RBC AUTO: 91.2 FL (ref 79–97)
METHADONE UR QL SCN: NEGATIVE
MONOCYTES # BLD AUTO: 0.88 10*3/MM3 (ref 0.1–0.9)
MONOCYTES NFR BLD AUTO: 6.5 % (ref 5–12)
NEUTROPHILS NFR BLD AUTO: 10.71 10*3/MM3 (ref 1.7–7)
NEUTROPHILS NFR BLD AUTO: 79.1 % (ref 42.7–76)
NITRITE UR QL STRIP: POSITIVE
NRBC BLD AUTO-RTO: 0 /100 WBC (ref 0–0.2)
OPIATES UR QL: NEGATIVE
OXYCODONE UR QL SCN: NEGATIVE
PCP UR QL SCN: NEGATIVE
PH UR STRIP.AUTO: 6.5 [PH] (ref 5–8)
PLATELET # BLD AUTO: 303 10*3/MM3 (ref 140–450)
PMV BLD AUTO: 9.9 FL (ref 6–12)
POTASSIUM SERPL-SCNC: 3.8 MMOL/L (ref 3.5–5.2)
PROT SERPL-MCNC: 8 G/DL (ref 6–8.5)
PROT UR QL STRIP: NEGATIVE
QT INTERVAL: 312 MS
QTC INTERVAL: 410 MS
RBC # BLD AUTO: 4.66 10*6/MM3 (ref 3.77–5.28)
RBC # UR STRIP: ABNORMAL /HPF
REF LAB TEST METHOD: ABNORMAL
SODIUM SERPL-SCNC: 135 MMOL/L (ref 136–145)
SP GR UR STRIP: 1.01 (ref 1–1.03)
SQUAMOUS #/AREA URNS HPF: ABNORMAL /HPF
TRICYCLICS UR QL SCN: NEGATIVE
TROPONIN T SERPL HS-MCNC: 8 NG/L
UROBILINOGEN UR QL STRIP: ABNORMAL
WBC # UR STRIP: ABNORMAL /HPF
WBC NRBC COR # BLD AUTO: 13.55 10*3/MM3 (ref 3.4–10.8)

## 2023-12-22 PROCEDURE — 82077 ASSAY SPEC XCP UR&BREATH IA: CPT | Performed by: PHYSICIAN ASSISTANT

## 2023-12-22 PROCEDURE — 93010 ELECTROCARDIOGRAM REPORT: CPT | Performed by: INTERNAL MEDICINE

## 2023-12-22 PROCEDURE — 93005 ELECTROCARDIOGRAM TRACING: CPT | Performed by: PHYSICIAN ASSISTANT

## 2023-12-22 PROCEDURE — 84484 ASSAY OF TROPONIN QUANT: CPT | Performed by: PHYSICIAN ASSISTANT

## 2023-12-22 PROCEDURE — 81001 URINALYSIS AUTO W/SCOPE: CPT | Performed by: PHYSICIAN ASSISTANT

## 2023-12-22 PROCEDURE — 87077 CULTURE AEROBIC IDENTIFY: CPT | Performed by: PHYSICIAN ASSISTANT

## 2023-12-22 PROCEDURE — 36415 COLL VENOUS BLD VENIPUNCTURE: CPT

## 2023-12-22 PROCEDURE — 99285 EMERGENCY DEPT VISIT HI MDM: CPT

## 2023-12-22 PROCEDURE — 80053 COMPREHEN METABOLIC PANEL: CPT | Performed by: PHYSICIAN ASSISTANT

## 2023-12-22 PROCEDURE — 87086 URINE CULTURE/COLONY COUNT: CPT | Performed by: PHYSICIAN ASSISTANT

## 2023-12-22 PROCEDURE — 83735 ASSAY OF MAGNESIUM: CPT | Performed by: PHYSICIAN ASSISTANT

## 2023-12-22 PROCEDURE — 99223 1ST HOSP IP/OBS HIGH 75: CPT | Performed by: PSYCHIATRY & NEUROLOGY

## 2023-12-22 PROCEDURE — 87186 SC STD MICRODIL/AGAR DIL: CPT | Performed by: PHYSICIAN ASSISTANT

## 2023-12-22 PROCEDURE — 85025 COMPLETE CBC W/AUTO DIFF WBC: CPT | Performed by: PHYSICIAN ASSISTANT

## 2023-12-22 PROCEDURE — 80307 DRUG TEST PRSMV CHEM ANLYZR: CPT | Performed by: PHYSICIAN ASSISTANT

## 2023-12-22 RX ORDER — NITROFURANTOIN 25; 75 MG/1; MG/1
100 CAPSULE ORAL EVERY 12 HOURS SCHEDULED
Status: DISCONTINUED | OUTPATIENT
Start: 2023-12-22 | End: 2023-12-26

## 2023-12-22 RX ORDER — TRAZODONE HYDROCHLORIDE 50 MG/1
50 TABLET ORAL NIGHTLY PRN
Status: DISCONTINUED | OUTPATIENT
Start: 2023-12-22 | End: 2023-12-27

## 2023-12-22 RX ORDER — OLANZAPINE 10 MG/1
10 TABLET ORAL NIGHTLY
Status: DISCONTINUED | OUTPATIENT
Start: 2023-12-22 | End: 2023-12-24

## 2023-12-22 RX ORDER — CEFDINIR 300 MG/1
300 CAPSULE ORAL EVERY 12 HOURS SCHEDULED
Qty: 14 CAPSULE | Refills: 0 | Status: COMPLETED | OUTPATIENT
Start: 2023-12-22 | End: 2023-12-28

## 2023-12-22 RX ORDER — FLUOXETINE HYDROCHLORIDE 20 MG/1
20 CAPSULE ORAL DAILY
Status: DISCONTINUED | OUTPATIENT
Start: 2023-12-22 | End: 2023-12-25

## 2023-12-22 RX ORDER — ECHINACEA PURPUREA EXTRACT 125 MG
2 TABLET ORAL AS NEEDED
Status: DISCONTINUED | OUTPATIENT
Start: 2023-12-22 | End: 2023-12-31 | Stop reason: HOSPADM

## 2023-12-22 RX ORDER — FAMOTIDINE 20 MG/1
20 TABLET, FILM COATED ORAL 2 TIMES DAILY PRN
Status: DISCONTINUED | OUTPATIENT
Start: 2023-12-22 | End: 2023-12-31 | Stop reason: HOSPADM

## 2023-12-22 RX ORDER — ONDANSETRON 4 MG/1
4 TABLET, FILM COATED ORAL EVERY 6 HOURS PRN
Status: DISCONTINUED | OUTPATIENT
Start: 2023-12-22 | End: 2023-12-31 | Stop reason: HOSPADM

## 2023-12-22 RX ORDER — NICOTINE 21 MG/24HR
1 PATCH, TRANSDERMAL 24 HOURS TRANSDERMAL
Status: DISCONTINUED | OUTPATIENT
Start: 2023-12-22 | End: 2023-12-31 | Stop reason: HOSPADM

## 2023-12-22 RX ORDER — IBUPROFEN 400 MG/1
400 TABLET ORAL EVERY 6 HOURS PRN
Status: DISCONTINUED | OUTPATIENT
Start: 2023-12-22 | End: 2023-12-31 | Stop reason: HOSPADM

## 2023-12-22 RX ORDER — BUPRENORPHINE HYDROCHLORIDE AND NALOXONE HYDROCHLORIDE DIHYDRATE 8; 2 MG/1; MG/1
2 TABLET SUBLINGUAL DAILY
Status: CANCELLED | OUTPATIENT
Start: 2023-12-23

## 2023-12-22 RX ORDER — BENZONATATE 100 MG/1
100 CAPSULE ORAL 3 TIMES DAILY PRN
Status: DISCONTINUED | OUTPATIENT
Start: 2023-12-22 | End: 2023-12-31 | Stop reason: HOSPADM

## 2023-12-22 RX ORDER — BUPRENORPHINE HYDROCHLORIDE AND NALOXONE HYDROCHLORIDE DIHYDRATE 8; 2 MG/1; MG/1
2 TABLET SUBLINGUAL DAILY
Status: DISCONTINUED | OUTPATIENT
Start: 2023-12-22 | End: 2023-12-31 | Stop reason: HOSPADM

## 2023-12-22 RX ORDER — LORAZEPAM 1 MG/1
1 TABLET ORAL ONCE
Status: DISCONTINUED | OUTPATIENT
Start: 2023-12-22 | End: 2023-12-22

## 2023-12-22 RX ORDER — PRAZOSIN HYDROCHLORIDE 1 MG/1
1 CAPSULE ORAL NIGHTLY
Status: DISCONTINUED | OUTPATIENT
Start: 2023-12-22 | End: 2023-12-26

## 2023-12-22 RX ORDER — ACETAMINOPHEN 325 MG/1
650 TABLET ORAL EVERY 6 HOURS PRN
Status: DISCONTINUED | OUTPATIENT
Start: 2023-12-22 | End: 2023-12-31 | Stop reason: HOSPADM

## 2023-12-22 RX ORDER — LOPERAMIDE HYDROCHLORIDE 2 MG/1
2 CAPSULE ORAL
Status: DISCONTINUED | OUTPATIENT
Start: 2023-12-22 | End: 2023-12-31 | Stop reason: HOSPADM

## 2023-12-22 RX ORDER — BENZTROPINE MESYLATE 1 MG/ML
1 INJECTION INTRAMUSCULAR; INTRAVENOUS ONCE AS NEEDED
Status: DISCONTINUED | OUTPATIENT
Start: 2023-12-22 | End: 2023-12-31 | Stop reason: HOSPADM

## 2023-12-22 RX ORDER — ALUMINA, MAGNESIA, AND SIMETHICONE 2400; 2400; 240 MG/30ML; MG/30ML; MG/30ML
15 SUSPENSION ORAL EVERY 6 HOURS PRN
Status: DISCONTINUED | OUTPATIENT
Start: 2023-12-22 | End: 2023-12-31 | Stop reason: HOSPADM

## 2023-12-22 RX ORDER — BENZTROPINE MESYLATE 1 MG/1
2 TABLET ORAL ONCE AS NEEDED
Status: DISCONTINUED | OUTPATIENT
Start: 2023-12-22 | End: 2023-12-31 | Stop reason: HOSPADM

## 2023-12-22 RX ORDER — LORAZEPAM 2 MG/1
2 TABLET ORAL ONCE
Status: COMPLETED | OUTPATIENT
Start: 2023-12-22 | End: 2023-12-22

## 2023-12-22 RX ORDER — HYDROXYZINE 50 MG/1
50 TABLET, FILM COATED ORAL EVERY 6 HOURS PRN
Status: DISCONTINUED | OUTPATIENT
Start: 2023-12-22 | End: 2023-12-31 | Stop reason: HOSPADM

## 2023-12-22 RX ADMIN — CEFDINIR 300 MG: 300 CAPSULE ORAL at 14:21

## 2023-12-22 RX ADMIN — CEFDINIR 300 MG: 300 CAPSULE ORAL at 21:17

## 2023-12-22 RX ADMIN — FLUOXETINE HYDROCHLORIDE 20 MG: 20 CAPSULE ORAL at 13:43

## 2023-12-22 RX ADMIN — NITROFURANTOIN MONOHYDRATE/MACROCRYSTALS 100 MG: 75; 25 CAPSULE ORAL at 13:43

## 2023-12-22 RX ADMIN — BUPRENORPHINE HYDROCHLORIDE AND NALOXONE HYDROCHLORIDE DIHYDRATE 2 TABLET: 8; 2 TABLET SUBLINGUAL at 13:43

## 2023-12-22 RX ADMIN — NICOTINE TRANSDERMAL SYSTEM 1 PATCH: 21 PATCH, EXTENDED RELEASE TRANSDERMAL at 13:43

## 2023-12-22 RX ADMIN — LORAZEPAM 2 MG: 2 TABLET ORAL at 10:28

## 2023-12-22 RX ADMIN — PRAZOSIN HYDROCHLORIDE 1 MG: 1 CAPSULE ORAL at 21:17

## 2023-12-22 RX ADMIN — OLANZAPINE 10 MG: 10 TABLET, FILM COATED ORAL at 21:17

## 2023-12-22 NOTE — NURSING NOTE
Spoke to Dr. Deutsch via phone. Intake information provided. Instructed to admit the patient. Admit orders received. RBVOx2.. Patient and ed provider made aware of plan of care. Safety precautions maintained.

## 2023-12-22 NOTE — NURSING NOTE
Patient presents to intake and reports that her son brought her in today. She had went up to stay the night with him and  then all night she was awake and pacing and talking about crazy stuff and hallucinating. Son and pt reports that she stopped taking all her medications  a few months ago. He tried to call and get her in at the [facility] and was told to bring her to the ER. He reports that she has been acting strange the past week and has not had her BP meds or nothing and has been hearing voices real bad the past couple of days.      pt noted to be looking around the area in intake and states that the tyler in the [address] is who she heard last night. No one present in hallway. She states that she heard him and he wanted to come into their house and kill her son and he said he was going to do it.     Patient denies any current etoh or drug use. Denies SI or HI. She reports the this time the voices started cause she thinks her brother is dead and the neighbor done it. Pt poor historian at times.     ER provide made aware of BP. Instructed him that per pt she is not taking any of her home medications. Information verbalized.

## 2023-12-22 NOTE — NURSING NOTE
Reviewed ua with ER provider. Instructed if admitted she will need to be on a course of omnicef for at least 7 days for uti. Information verbalized.

## 2023-12-22 NOTE — NURSING NOTE
All personal belongings given to son and instructed he will take them home to her house and have her boyfriend bring her in some stuff tomorrow that she can wear. Ok with patient. Son states she really needs to be taking her medications and get some sleep. He reports that she has not slept in days. Information verbalized.
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
## 2023-12-22 NOTE — ED PROVIDER NOTES
Subjective   History of Present Illness  53-year-old female presents secondary to need for psychiatric valuation.  Patient has not slept in approximately 48 hours.  She is been hallucinating.  She states that she has not been taking her medications.  She has a history of hypertension, seizures, psychosis.  She states that she has not been taking any of her medications at all.  She presents by private vehicle.  She denies any chest pain pressure tightness or squeezing any symptoms from hypertension to me however she subsequently told the nurse that she had been having some intermittent episodes of chest discomfort.  Will obtain an EKG and troponins.        Review of Systems   Constitutional: Negative.  Negative for fever.   HENT: Negative.     Respiratory: Negative.     Cardiovascular: Negative.  Negative for chest pain.   Gastrointestinal: Negative.  Negative for abdominal pain.   Endocrine: Negative.    Genitourinary: Negative.  Negative for dysuria.   Skin: Negative.    Neurological: Negative.    Psychiatric/Behavioral:  Positive for hallucinations. Negative for suicidal ideas.    All other systems reviewed and are negative.      Past Medical History:   Diagnosis Date    Hypertension     Psychosis     Seizures     Substance abuse        Allergies   Allergen Reactions    Codeine Itching and GI Intolerance       Past Surgical History:   Procedure Laterality Date    ANKLE SURGERY      APPENDECTOMY      TONSILLECTOMY         History reviewed. No pertinent family history.    Social History     Socioeconomic History    Marital status:    Tobacco Use    Smoking status: Every Day     Packs/day: 1     Types: Cigarettes    Smokeless tobacco: Never   Vaping Use    Vaping Use: Never used   Substance and Sexual Activity    Alcohol use: Yes     Comment: denies at this time.    Drug use: Defer     Types: Other     Comment: DENIES drug use but on suboxone    Sexual activity: Not Currently           Objective   Physical  Exam  Vitals and nursing note reviewed.   Constitutional:       General: She is not in acute distress.     Appearance: She is well-developed. She is not diaphoretic.   HENT:      Head: Normocephalic and atraumatic.      Right Ear: External ear normal.      Left Ear: External ear normal.      Nose: Nose normal.   Eyes:      Conjunctiva/sclera: Conjunctivae normal.      Pupils: Pupils are equal, round, and reactive to light.   Neck:      Vascular: No JVD.      Trachea: No tracheal deviation.   Cardiovascular:      Rate and Rhythm: Normal rate and regular rhythm.      Heart sounds: Normal heart sounds. No murmur heard.  Pulmonary:      Effort: Pulmonary effort is normal. No respiratory distress.      Breath sounds: Normal breath sounds. No wheezing.   Abdominal:      General: Bowel sounds are normal.      Palpations: Abdomen is soft.      Tenderness: There is no abdominal tenderness.   Musculoskeletal:         General: No deformity. Normal range of motion.      Cervical back: Normal range of motion and neck supple.   Skin:     General: Skin is warm and dry.      Coloration: Skin is not pale.      Findings: No erythema or rash.   Neurological:      Mental Status: She is alert and oriented to person, place, and time.      Cranial Nerves: No cranial nerve deficit.   Psychiatric:         Behavior: Behavior normal.         Thought Content: Thought content normal.         Procedures           ED Course  ED Course as of 12/22/23 1356   Fri Dec 22, 2023   1149 EKG notes sinus tachycardia.  104 bpm.  No acute ST elevation.  QTc 410.  Electronically signed by Gary Rodgers DO, 12/22/23, 11:50 AM EST.   [SF]   1203 Second troponin negative.  Patient's chest discomfort was felt to be due to anxiety.  No continued discomfort.  EKG normal.  Heart score 2. [JI]      ED Course User Index  [JI] Edvin Vidal PA  [SF] Gary Rodegrs DO                HEART Score: 2                  Results for orders placed or performed during  the hospital encounter of 12/22/23   Comprehensive Metabolic Panel    Specimen: Arm, Left; Blood   Result Value Ref Range    Glucose 157 (H) 65 - 99 mg/dL    BUN 7 6 - 20 mg/dL    Creatinine 0.84 0.57 - 1.00 mg/dL    Sodium 135 (L) 136 - 145 mmol/L    Potassium 3.8 3.5 - 5.2 mmol/L    Chloride 97 (L) 98 - 107 mmol/L    CO2 23.8 22.0 - 29.0 mmol/L    Calcium 9.8 8.6 - 10.5 mg/dL    Total Protein 8.0 6.0 - 8.5 g/dL    Albumin 4.1 3.5 - 5.2 g/dL    ALT (SGPT) 15 1 - 33 U/L    AST (SGOT) 18 1 - 32 U/L    Alkaline Phosphatase 96 39 - 117 U/L    Total Bilirubin 0.5 0.0 - 1.2 mg/dL    Globulin 3.9 gm/dL    A/G Ratio 1.1 g/dL    BUN/Creatinine Ratio 8.3 7.0 - 25.0    Anion Gap 14.2 5.0 - 15.0 mmol/L    eGFR 83.7 >60.0 mL/min/1.73   Urinalysis With Microscopic If Indicated (No Culture) - Urine, Clean Catch    Specimen: Urine, Clean Catch   Result Value Ref Range    Color, UA Yellow Yellow, Straw    Appearance, UA Clear Clear    pH, UA 6.5 5.0 - 8.0    Specific Gravity, UA 1.006 1.005 - 1.030    Glucose, UA Negative Negative    Ketones, UA Negative Negative    Bilirubin, UA Negative Negative    Blood, UA Negative Negative    Protein, UA Negative Negative    Leuk Esterase, UA Small (1+) (A) Negative    Nitrite, UA Positive (A) Negative    Urobilinogen, UA 0.2 E.U./dL 0.2 - 1.0 E.U./dL   Ethanol    Specimen: Arm, Left; Blood   Result Value Ref Range    Ethanol <10 0 - 10 mg/dL    Ethanol % <0.010 %   Urine Drug Screen - Urine, Clean Catch    Specimen: Urine, Clean Catch   Result Value Ref Range    THC, Screen, Urine Negative Negative    Phencyclidine (PCP), Urine Negative Negative    Cocaine Screen, Urine Negative Negative    Methamphetamine, Ur Negative Negative    Opiate Screen Negative Negative    Amphetamine Screen, Urine Negative Negative    Benzodiazepine Screen, Urine Negative Negative    Tricyclic Antidepressants Screen Negative Negative    Methadone Screen, Urine Negative Negative    Barbiturates Screen, Urine Negative  Negative    Oxycodone Screen, Urine Negative Negative    Buprenorphine, Screen, Urine Positive (A) Negative   Magnesium    Specimen: Arm, Left; Blood   Result Value Ref Range    Magnesium 1.7 1.6 - 2.6 mg/dL   CBC Auto Differential    Specimen: Arm, Left; Blood   Result Value Ref Range    WBC 13.55 (H) 3.40 - 10.80 10*3/mm3    RBC 4.66 3.77 - 5.28 10*6/mm3    Hemoglobin 14.3 12.0 - 15.9 g/dL    Hematocrit 42.5 34.0 - 46.6 %    MCV 91.2 79.0 - 97.0 fL    MCH 30.7 26.6 - 33.0 pg    MCHC 33.6 31.5 - 35.7 g/dL    RDW 13.2 12.3 - 15.4 %    RDW-SD 44.6 37.0 - 54.0 fl    MPV 9.9 6.0 - 12.0 fL    Platelets 303 140 - 450 10*3/mm3    Neutrophil % 79.1 (H) 42.7 - 76.0 %    Lymphocyte % 13.4 (L) 19.6 - 45.3 %    Monocyte % 6.5 5.0 - 12.0 %    Eosinophil % 0.1 (L) 0.3 - 6.2 %    Basophil % 0.4 0.0 - 1.5 %    Immature Grans % 0.5 0.0 - 0.5 %    Neutrophils, Absolute 10.71 (H) 1.70 - 7.00 10*3/mm3    Lymphocytes, Absolute 1.82 0.70 - 3.10 10*3/mm3    Monocytes, Absolute 0.88 0.10 - 0.90 10*3/mm3    Eosinophils, Absolute 0.01 0.00 - 0.40 10*3/mm3    Basophils, Absolute 0.06 0.00 - 0.20 10*3/mm3    Immature Grans, Absolute 0.07 (H) 0.00 - 0.05 10*3/mm3    nRBC 0.0 0.0 - 0.2 /100 WBC   High Sensitivity Troponin T    Specimen: Arm, Left; Blood   Result Value Ref Range    HS Troponin T 8 <14 ng/L   Fentanyl, Urine - Urine, Clean Catch    Specimen: Urine, Clean Catch   Result Value Ref Range    Fentanyl, Urine Negative Negative   Urinalysis, Microscopic Only - Urine, Clean Catch    Specimen: Urine, Clean Catch   Result Value Ref Range    RBC, UA 0-2 None Seen, 0-2 /HPF    WBC, UA 11-20 (A) None Seen, 0-2 /HPF    Bacteria, UA 4+ (A) None Seen /HPF    Squamous Epithelial Cells, UA 0-2 None Seen, 0-2 /HPF    Hyaline Casts, UA None Seen None Seen /LPF    Methodology Automated Microscopy    [place] Urine Culture Tube - Urine, Clean Catch    Specimen: Urine, Clean Catch   Result Value Ref Range    Extra Tube Hold for add-ons.    ECG 12 Lead  Chest Pain   Result Value Ref Range    QT Interval 312 ms    QTC Interval 410 ms                   Medical Decision Making  53-year-old female presents secondary to need for psychiatric valuation.  Patient has not slept in approximately 48 hours.  She is been hallucinating.  She states that she has not been taking her medications.  She has a history of hypertension, seizures, psychosis.  She states that she has not been taking any of her medications at all.  She presents by private vehicle.  She denies any chest pain pressure tightness or squeezing any symptoms from hypertension to me however she subsequently told the nurse that she had been having some intermittent episodes of chest discomfort.  Will obtain an EKG and troponins.    Amount and/or Complexity of Data Reviewed  Labs: ordered. Decision-making details documented in ED Course.  ECG/medicine tests: ordered. Decision-making details documented in ED Course.  Discussion of management or test interpretation with external provider(s): On-call psychiatrist for the intake nurse.    Risk  Prescription drug management.  Risk Details: Patient was agreeable treatment plan of admission to the [facility] for further evaluation and treatment.        Final diagnoses:   Psychosis, unspecified psychosis type   Insomnia, unspecified type   Hypertension, unspecified type       ED Disposition  ED Disposition       ED Disposition   DC/Transfer to Behavioral Health Condition   Stable    Comment   --               No follow-up provider specified.       Medication List      No changes were made to your prescriptions during this visit.            Edvin Vidal PA  12/22/23 4559

## 2023-12-22 NOTE — NURSING NOTE
Patient presents SI no plan- Pt states” I have been hearing voices-I have set up and listening to them all night the have been hiding under my house. The voices said they would kill my son Luigi,  My brother passed away 2 days ago and I have not been the same since. My son said the voices are not real but they are there ”    Pt denies substance dependency anxiety 8 depression 10.  Pt reports poor sleep and appetite. Patient presents restless during assessment- difficulty focusing- frequently looking out window- responding to “ female voice, I know I heard her yell at me.”       Patient report stopped taking Bipolar medication 6 months ago. Prior inpatient admission 6/03/23. Pt currently living with boy friend Jurgen Castellanos- [phone], plans to return upon discharge.

## 2023-12-23 LAB
ANION GAP SERPL CALCULATED.3IONS-SCNC: 11.3 MMOL/L (ref 5–15)
BASOPHILS # BLD AUTO: 0.11 10*3/MM3 (ref 0–0.2)
BASOPHILS NFR BLD AUTO: 0.9 % (ref 0–1.5)
BUN SERPL-MCNC: 10 MG/DL (ref 6–20)
BUN/CREAT SERPL: 8.7 (ref 7–25)
C TRACH RRNA CVX QL NAA+PROBE: NOT DETECTED
CALCIUM SPEC-SCNC: 9.9 MG/DL (ref 8.6–10.5)
CHLORIDE SERPL-SCNC: 102 MMOL/L (ref 98–107)
CO2 SERPL-SCNC: 25.7 MMOL/L (ref 22–29)
CREAT SERPL-MCNC: 1.15 MG/DL (ref 0.57–1)
DEPRECATED RDW RBC AUTO: 45.4 FL (ref 37–54)
EGFRCR SERPLBLD CKD-EPI 2021: 57.4 ML/MIN/1.73
EOSINOPHIL # BLD AUTO: 0.2 10*3/MM3 (ref 0–0.4)
EOSINOPHIL NFR BLD AUTO: 1.6 % (ref 0.3–6.2)
ERYTHROCYTE [DISTWIDTH] IN BLOOD BY AUTOMATED COUNT: 13.4 % (ref 12.3–15.4)
GLUCOSE SERPL-MCNC: 103 MG/DL (ref 65–99)
HAV IGM SERPL QL IA: ABNORMAL
HBV CORE IGM SERPL QL IA: ABNORMAL
HBV SURFACE AG SERPL QL IA: ABNORMAL
HCT VFR BLD AUTO: 45 % (ref 34–46.6)
HCV AB SER DONR QL: REACTIVE
HGB BLD-MCNC: 14.7 G/DL (ref 12–15.9)
IMM GRANULOCYTES # BLD AUTO: 0.05 10*3/MM3 (ref 0–0.05)
IMM GRANULOCYTES NFR BLD AUTO: 0.4 % (ref 0–0.5)
LYMPHOCYTES # BLD AUTO: 3.11 10*3/MM3 (ref 0.7–3.1)
LYMPHOCYTES NFR BLD AUTO: 25.1 % (ref 19.6–45.3)
MCH RBC QN AUTO: 30.2 PG (ref 26.6–33)
MCHC RBC AUTO-ENTMCNC: 32.7 G/DL (ref 31.5–35.7)
MCV RBC AUTO: 92.4 FL (ref 79–97)
MONOCYTES # BLD AUTO: 1.37 10*3/MM3 (ref 0.1–0.9)
MONOCYTES NFR BLD AUTO: 11.1 % (ref 5–12)
N GONORRHOEA RRNA SPEC QL NAA+PROBE: NOT DETECTED
NEUTROPHILS NFR BLD AUTO: 60.9 % (ref 42.7–76)
NEUTROPHILS NFR BLD AUTO: 7.53 10*3/MM3 (ref 1.7–7)
NRBC BLD AUTO-RTO: 0 /100 WBC (ref 0–0.2)
PLATELET # BLD AUTO: 313 10*3/MM3 (ref 140–450)
PMV BLD AUTO: 10.1 FL (ref 6–12)
POTASSIUM SERPL-SCNC: 4.1 MMOL/L (ref 3.5–5.2)
RBC # BLD AUTO: 4.87 10*6/MM3 (ref 3.77–5.28)
SODIUM SERPL-SCNC: 139 MMOL/L (ref 136–145)
TRICHOMONAS VAGINALIS PCR: NOT DETECTED
WBC NRBC COR # BLD AUTO: 12.37 10*3/MM3 (ref 3.4–10.8)

## 2023-12-23 PROCEDURE — 80048 BASIC METABOLIC PNL TOTAL CA: CPT | Performed by: PSYCHIATRY & NEUROLOGY

## 2023-12-23 PROCEDURE — 87591 N.GONORRHOEAE DNA AMP PROB: CPT | Performed by: PSYCHIATRY & NEUROLOGY

## 2023-12-23 PROCEDURE — 87661 TRICHOMONAS VAGINALIS AMPLIF: CPT | Performed by: PSYCHIATRY & NEUROLOGY

## 2023-12-23 PROCEDURE — 99232 SBSQ HOSP IP/OBS MODERATE 35: CPT | Performed by: PSYCHIATRY & NEUROLOGY

## 2023-12-23 PROCEDURE — 85025 COMPLETE CBC W/AUTO DIFF WBC: CPT | Performed by: PSYCHIATRY & NEUROLOGY

## 2023-12-23 PROCEDURE — 80074 ACUTE HEPATITIS PANEL: CPT | Performed by: PSYCHIATRY & NEUROLOGY

## 2023-12-23 PROCEDURE — 87491 CHLMYD TRACH DNA AMP PROBE: CPT | Performed by: PSYCHIATRY & NEUROLOGY

## 2023-12-23 RX ADMIN — OLANZAPINE 10 MG: 10 TABLET, FILM COATED ORAL at 20:37

## 2023-12-23 RX ADMIN — FLUOXETINE HYDROCHLORIDE 20 MG: 20 CAPSULE ORAL at 08:06

## 2023-12-23 RX ADMIN — NICOTINE TRANSDERMAL SYSTEM 1 PATCH: 21 PATCH, EXTENDED RELEASE TRANSDERMAL at 08:07

## 2023-12-23 RX ADMIN — NITROFURANTOIN MONOHYDRATE/MACROCRYSTALS 100 MG: 75; 25 CAPSULE ORAL at 08:06

## 2023-12-23 RX ADMIN — CEFDINIR 300 MG: 300 CAPSULE ORAL at 08:06

## 2023-12-23 RX ADMIN — CEFDINIR 300 MG: 300 CAPSULE ORAL at 20:37

## 2023-12-23 RX ADMIN — ACETAMINOPHEN 650 MG: 325 TABLET ORAL at 21:52

## 2023-12-23 RX ADMIN — IBUPROFEN 400 MG: 400 TABLET, FILM COATED ORAL at 08:19

## 2023-12-23 RX ADMIN — BUPRENORPHINE HYDROCHLORIDE AND NALOXONE HYDROCHLORIDE DIHYDRATE 2 TABLET: 8; 2 TABLET SUBLINGUAL at 08:06

## 2023-12-23 RX ADMIN — HYDROXYZINE HYDROCHLORIDE 50 MG: 50 TABLET ORAL at 08:19

## 2023-12-23 RX ADMIN — NITROFURANTOIN MONOHYDRATE/MACROCRYSTALS 100 MG: 75; 25 CAPSULE ORAL at 20:37

## 2023-12-23 RX ADMIN — HYDROXYZINE HYDROCHLORIDE 50 MG: 50 TABLET ORAL at 21:52

## 2023-12-23 RX ADMIN — PRAZOSIN HYDROCHLORIDE 1 MG: 1 CAPSULE ORAL at 20:37

## 2023-12-23 NOTE — PROGRESS NOTES
"INPATIENT PSYCHIATRIC PROGRESS NOTE    Name:  Anna Moore  :  1970  MRN:  [id number]  Visit Number:  [id number]  Length of stay:  1    SUBJECTIVE    CC/Focus of Exam: Psychosis    INTERVAL HISTORY:  First time seeing patient.  Chart, notes, vitals, labs and EKG personally reviewed.  Glucose 157, WBC 13.55, UA 11-20 WBC and positive nitrite, hep C antibody +, UDS + buprenorphine    Patient continues to appear psychotic, voicing ongoing paranoid delusions of voices in her home, hiding under the floor, intending harm to her.  Patient reports brother passed away 2 days prior to hospitalization.  She is conflicted about it, reporting this is the brother who \"terrorized me my whole life.\"  Patient appears to be responding to internal stimuli, paranoid and anxious.    Depression rating 6/10  Anxiety rating 8/10  Sleep: Poor  Withdrawal sx: Denied  Cravin/10    Review of Systems   Constitutional: Negative.    Respiratory: Negative.     Cardiovascular: Negative.    Gastrointestinal: Negative.    Musculoskeletal: Negative.    Psychiatric/Behavioral:  Positive for dysphoric mood, hallucinations and sleep disturbance. The patient is nervous/anxious.        OBJECTIVE    Temp:  [97.5 °F (36.4 °C)-97.9 °F (36.6 °C)] 97.5 °F (36.4 °C)  Heart Rate:  [] 110  Resp:  [16-18] 18  BP: (134-151)/(78-97) 134/97    MENTAL STATUS EXAM:  Appearance: Casually dressed, good hygeine.   Cooperation: Guarded  Psychomotor: No psychomotor agitation/retardation, No EPS, No motor tics  Speech: normal rate, amount.  Mood: \"Anxious\"   Affect: congruent, restricted  Thought Content: goal directed, delusional material present  Thought process: linear, organized.  Suicidality: No SI  Homicidality: No HI  Perception: + AH/VH  Insight: Poor  Judgment: fair    Lab Results (last 24 hours)       Procedure Component Value Units Date/Time    Hepatitis Panel, Acute [[id number]]  (Abnormal) Collected: 23 0848    Specimen: Blood Updated: " 12/23/23 1048     Hepatitis B Surface Ag Non-Reactive     Hep A IgM Non-Reactive     Hep B C IgM Non-Reactive     Hepatitis C Ab Reactive    Narrative:      Results may be falsely decreased if patient taking Biotin.     Basic Metabolic Panel [[id number]]  (Abnormal) Collected: 12/23/23 0848    Specimen: Blood Updated: 12/23/23 0941     Glucose 103 mg/dL      BUN 10 mg/dL      Creatinine 1.15 mg/dL      Sodium 139 mmol/L      Potassium 4.1 mmol/L      Chloride 102 mmol/L      CO2 25.7 mmol/L      Calcium 9.9 mg/dL      BUN/Creatinine Ratio 8.7     Anion Gap 11.3 mmol/L      eGFR 57.4 mL/min/1.73     Narrative:      GFR Normal >60  Chronic Kidney Disease <60  Kidney Failure <15      CBC & Differential [[id number]]  (Abnormal) Collected: 12/23/23 0848    Specimen: Blood Updated: 12/23/23 0913    Narrative:      The following orders were created for panel order CBC & Differential.  Procedure                               Abnormality         Status                     ---------                               -----------         ------                     CBC Auto Differential[[id number]]        Abnormal            Final result                 Please view results for these tests on the individual orders.    CBC Auto Differential [[id number]]  (Abnormal) Collected: 12/23/23 0848    Specimen: Blood Updated: 12/23/23 0913     WBC 12.37 10*3/mm3      RBC 4.87 10*6/mm3      Hemoglobin 14.7 g/dL      Hematocrit 45.0 %      MCV 92.4 fL      MCH 30.2 pg      MCHC 32.7 g/dL      RDW 13.4 %      RDW-SD 45.4 fl      MPV 10.1 fL      Platelets 313 10*3/mm3      Neutrophil % 60.9 %      Lymphocyte % 25.1 %      Monocyte % 11.1 %      Eosinophil % 1.6 %      Basophil % 0.9 %      Immature Grans % 0.4 %      Neutrophils, Absolute 7.53 10*3/mm3      Lymphocytes, Absolute 3.11 10*3/mm3      Monocytes, Absolute 1.37 10*3/mm3      Eosinophils, Absolute 0.20 10*3/mm3      Basophils, Absolute 0.11 10*3/mm3      Immature Grans, Absolute 0.05  10*3/mm3      nRBC 0.0 /100 WBC                Imaging Results (Last 24 Hours)       ** No results found for the last 24 hours. **               ECG/EMG Results (most recent)       None             ALLERGIES: Codeine      Current Facility-Administered Medications:     acetaminophen (TYLENOL) tablet 650 mg, 650 mg, Oral, Q6H PRN, Oumar Deutsch MD    aluminum-magnesium hydroxide-simethicone (MAALOX MAX) 400-400-40 MG/5ML suspension 15 mL, 15 mL, Oral, Q6H PRN, Oumar Deutsch MD    benzonatate (TESSALON) capsule 100 mg, 100 mg, Oral, TID PRN, Oumar Deutsch MD    benztropine (COGENTIN) tablet 2 mg, 2 mg, Oral, Once PRN **OR** benztropine (COGENTIN) injection 1 mg, 1 mg, Intramuscular, Once PRN, Oumar Deutsch MD    buprenorphine-naloxone (SUBOXONE) 8-2 MG per SL tablet 2 tablet, 2 tablet, Sublingual, Daily, Trevor Juarez MD, 2 tablet at 12/23/23 0806    cefdinir (OMNICEF) capsule 300 mg, 300 mg, Oral, Q12H, Oumar Deutsch MD, 300 mg at 12/23/23 0806    famotidine (PEPCID) tablet 20 mg, 20 mg, Oral, BID PRN, Oumar Deutsch MD    FLUoxetine (PROzac) capsule 20 mg, 20 mg, Oral, Daily, Trevor Juarez MD, 20 mg at 12/23/23 0806    hydrOXYzine (ATARAX) tablet 50 mg, 50 mg, Oral, Q6H PRN, Oumar Deutsch MD, 50 mg at 12/23/23 0819    ibuprofen (ADVIL,MOTRIN) tablet 400 mg, 400 mg, Oral, Q6H PRN, Oumar Deutsch MD, 400 mg at 12/23/23 0819    loperamide (IMODIUM) capsule 2 mg, 2 mg, Oral, Q2H PRN, Oumar Deutsch MD    magnesium hydroxide (MILK OF MAGNESIA) suspension 10 mL, 10 mL, Oral, Daily PRN, Oumar Deutsch MD    nicotine (NICODERM CQ) 21 MG/24HR patch 1 patch, 1 patch, Transdermal, Q24H, Oumar Deutsch MD, 1 patch at 12/23/23 0807    nitrofurantoin (macrocrystal-monohydrate) (MACROBID) capsule 100 mg, 100 mg, Oral, Q12H, Trevor Juarez MD, 100 mg at 12/23/23 0806    OLANZapine (zyPREXA) tablet 10 mg, 10 mg, Oral, Nightly, Trevor Juarez MD, 10 mg at  12/22/23 2117    ondansetron (ZOFRAN) tablet 4 mg, 4 mg, Oral, Q6H PRN, Oumar Deutsch MD    prazosin (MINIPRESS) capsule 1 mg, 1 mg, Oral, Nightly, Trevor Juarez MD, 1 mg at 12/22/23 2117    sodium chloride nasal spray 2 spray, 2 spray, Each Nare, PRN, Oumar Deutsch MD    traZODone (DESYREL) tablet 50 mg, 50 mg, Oral, Nightly PRN, Oumar Deutsch MD    Reviewed chart, notes, vitals, labs and EKG personally reviewed.    ASSESSMENT & PLAN:    Schizoaffective disorder, bipolar type  -Patient has been off psychiatric medications for some time.  At time of last hospitalization patient was stabilized with olanzapine 20mg QHS, Seroquel 100mg QHS, Prozac 20mg and prazosin 1mg QHS.  - Will restart zyprexa at 10mg and titrate to effect  - Will restart Seroquel at 100mg QHS  -We will establish outpatient psychiatric care following hospitalization     Post Traumatic Stress Disorder  - Restart prazosin 1mg QHS  - Restart prozac at 20mg initially, plan to titrate to 40mg Daily  - Refer for trauma focused therapy     Opioid use disorder, severe, dependence, on maintenance medication  -Admission UDS positive for buprenorphine.  PDMP data reviewed.  -Continue Suboxone 16 mg daily  -We will refer for substance abuse treatment following hospitalization     Urinary tract infection  -UA with 4+ bacteria, 11-20 WBC, positive nitrite, small leukocyte.  -Culture pending  -Order STI panel     Hyponatremia  - Na 135 on admission  - Encourage po intake and repeat BMP in AM     Elevated WBC  - 13.55 on admission, may be secondary to UTI or stress induced  - Monitor, repeat CBC with mild improvement to 12.37      Special precautions: Special Precautions Level 3 (q15 min checks)     Behavioral Health Treatment Plan and Problem List: I have reviewed and approved the Behavioral Health Treatment Plan and Problem list.  The patient has had a chance to review and agrees with the treatment plan.    I have reviewed the copied text  and it is accurate as of 12/23/23     Clinician:  Tee Potter MD  12/23/23  11:55 EST

## 2023-12-23 NOTE — PLAN OF CARE
Goal Outcome Evaluation:  Plan of Care Reviewed With: patient  Patient Agreement with Plan of Care: agrees  Consent Given to Review Plan with: Declined family involvement.  Progress: improving  Outcome Evaluation: Therapist met with Patient to review care plan, social history, aftercare recommendations and disposition plans; Patient agreeable.    Problem: Adult Behavioral Health Plan of Care  Goal: Plan of Care Review  Outcome: Ongoing, Progressing  Flowsheets (Taken 12/23/2023 1209)  Consent Given to Review Plan with: Declined family involvement.  Progress: improving  Plan of Care Reviewed With: patient  Patient Agreement with Plan of Care: agrees  Outcome Evaluation:   Therapist met with Patient to review care plan, social history, aftercare recommendations and disposition plans   Patient agreeable.     Problem: Adult Behavioral Health Plan of Care  Goal: Patient-Specific Goal (Individualization)  Outcome: Ongoing, Progressing  Flowsheets  Taken 12/23/2023 1209  Patient-Specific Goals (Include Timeframe): Patient will identify 2-3 healthy coping skills, complete safety plan, complete aftercare plan and deny SI/HI prior to discharge.  Individualized Care Needs: Therapist will offer 1-4 therapy sessions, safety planning, aftercare planning, daily groups and brief CBT/MI interventions.  Anxieties, Fears or Concerns: None voiced.  Taken 12/23/2023 1204  Patient Personal Strengths:   expressive of emotions   expressive of needs   family/social support   self-awareness   resourceful   resilient   tolerant   self-reliant   motivated for recovery   motivated for treatment   stable living environment  Patient Vulnerabilities:   poor impulse control   lacks insight into illness   history of unsuccessful treatment     Problem: Adult Behavioral Health Plan of Care  Goal: Optimized Coping Skills in Response to Life Stressors  Outcome: Ongoing, Progressing  Flowsheets (Taken 12/23/2023 1209)  Optimized Coping Skills in Response  to Life Stressors: making progress toward outcome  Intervention: Promote Effective Coping Strategies  Flowsheets (Taken 12/23/2023 1209)  Supportive Measures:   active listening utilized   decision-making supported   positive reinforcement provided   verbalization of feelings encouraged     Problem: Adult Behavioral Health Plan of Care  Goal: Develops/Participates in Therapeutic [place] to Support Successful Transition  Outcome: Ongoing, Progressing  Flowsheets (Taken 12/23/2023 1209)  Develops/Participates in Therapeutic [place] to Support Successful Transition: making progress toward outcome  Intervention: Foster Therapeutic [place]  Flowsheets (Taken 12/23/2023 1209)  Trust Relationship/Rapport:   care explained   questions answered   choices provided   questions encouraged   emotional support provided   reassurance provided   empathic listening provided   thoughts/feelings acknowledged  Intervention: Mutually Develop Transition Plan  Flowsheets (Taken 12/23/2023 1209)  Outpatient/Agency/Support Group Needs:   outpatient medication management   outpatient counseling   outpatient psychiatric care (specify)  Discharge Coordination/Progress:   Therapist met with Patient to complete a discharge needs assessment   Patient agreeable.  Transition Support:   follow-up care coordinated   follow-up care discussed   crisis management plan promoted   community resources reviewed   crisis management plan verbalized  Transportation Anticipated: family or friend will provide  Anticipated Discharge Disposition: home or self-care  Transportation Concerns: no car  Current Discharge Risk: psychiatric illness  Concerns to be Addressed:   mental health   suicidal   medication   coping/stress   adjustment to diagnosis/illness  Readmission Within the Last 30 Days: no previous admission in last 30 days  Patient/Family Anticipated Services at Transition:   mental health services   outpatient care  Patient/Family Anticipates Transition  to: home    DATA: Therapist met individually with Patient this date for initial evaluation.  Introduced self as Therapist and the role of a positive therapeutic relationship; Patient agreeable.      Therapist encouraged Patient to speak openly and honestly about any issues or stressors during treatment stay. Therapist explained how open communication is significant to providing most effective care.      Therapist completed psychosocial assessment, integrated summary, reviewed care plans, disposition planning and discussed hospitalization expectations and treatment goals this date.     Therapist provided education regarding different levels of care and is recommending outpatient for most appropriate aftercare. Patient agreeable. Patient wants to follow-up with [facility]     Therapist is recommending family involvement prior to discharge and it's importance. Patient declined family     CLINICAL MANUVERING/INTERVENTIONS:  Assisted Patient in processing session content; acknowledged and normalized Patient’s thoughts, feelings, and concerns by utilizing a person-centered approach in efforts to build appropriate rapport and a positive therapeutic relationship with open and honest communication. Allowed Patient to ventilate regarding current stressors and triggers for negative emotions and thoughts in a safe nonjudgmental environment with unconditional positive regard, active listening skills, and empathy.     ASSESSMENT: Anna Moore is a 53-year-old  female who presented to the ED reporting auditory hallucinations. Patient was calm and cooperative with assessment. She states she has been off of her medications for awhile and his hopeful she can start them back. She reports a recent stressor as the passing of her brother. Patent reports increased auditory hallucinations and poor sleep.    PLAN: Patient will receive 24/7 nursing monitoring and daily psychiatrist evaluation by a multidisciplinary  team.    Patient will continue stabilization at this time.     Patient is agreeable for outpatient services with [facility].     Public assistance with transportation will not be needed. Family member will provide.

## 2023-12-23 NOTE — PLAN OF CARE
Goal Outcome Evaluation:  Plan of Care Reviewed With: patient  Patient Agreement with Plan of Care: agrees     Progress: no change  Outcome Evaluation: Patient calm and cooperative. Rates anxiety and depression both a 8. Reports AVH. Denies SI/HI. Patient has slept the majority of the day.

## 2023-12-23 NOTE — PLAN OF CARE
"Goal Outcome Evaluation:  Plan of Care Reviewed With: patient  Patient Agreement with Plan of Care: agrees         Pt is confused with place, time and situation. Pt rated anxiety 0/10 and depression 10/10. Pt denies SI/HI. When ask patient if she was having any hallucinations, she stated she was and when ask what type of hallucinations she was having patient responded she was having \"hot\" hallucinations. Pt didn't voice any concerns at this time. No acute s/s of distress noted.          "

## 2023-12-23 NOTE — NURSING NOTE
Pharmacy contacted in reference to administration and rescheduling of ABX. Representative ( Lidia) informed and approved the administration of ABX before the 12 hour window. Will continue to monitor.

## 2023-12-24 LAB — BACTERIA SPEC AEROBE CULT: ABNORMAL

## 2023-12-24 PROCEDURE — 99232 SBSQ HOSP IP/OBS MODERATE 35: CPT | Performed by: PSYCHIATRY & NEUROLOGY

## 2023-12-24 RX ORDER — DOCUSATE SODIUM 100 MG/1
100 CAPSULE, LIQUID FILLED ORAL 2 TIMES DAILY
Status: DISCONTINUED | OUTPATIENT
Start: 2023-12-24 | End: 2023-12-31 | Stop reason: HOSPADM

## 2023-12-24 RX ADMIN — HYDROXYZINE HYDROCHLORIDE 50 MG: 50 TABLET ORAL at 06:55

## 2023-12-24 RX ADMIN — NITROFURANTOIN MONOHYDRATE/MACROCRYSTALS 100 MG: 75; 25 CAPSULE ORAL at 20:38

## 2023-12-24 RX ADMIN — HYDROXYZINE HYDROCHLORIDE 50 MG: 50 TABLET ORAL at 16:07

## 2023-12-24 RX ADMIN — CEFDINIR 300 MG: 300 CAPSULE ORAL at 20:38

## 2023-12-24 RX ADMIN — PRAZOSIN HYDROCHLORIDE 1 MG: 1 CAPSULE ORAL at 20:37

## 2023-12-24 RX ADMIN — IBUPROFEN 400 MG: 400 TABLET, FILM COATED ORAL at 13:19

## 2023-12-24 RX ADMIN — IBUPROFEN 400 MG: 400 TABLET, FILM COATED ORAL at 06:55

## 2023-12-24 RX ADMIN — CEFDINIR 300 MG: 300 CAPSULE ORAL at 08:23

## 2023-12-24 RX ADMIN — NICOTINE TRANSDERMAL SYSTEM 1 PATCH: 21 PATCH, EXTENDED RELEASE TRANSDERMAL at 08:25

## 2023-12-24 RX ADMIN — BUPRENORPHINE HYDROCHLORIDE AND NALOXONE HYDROCHLORIDE DIHYDRATE 2 TABLET: 8; 2 TABLET SUBLINGUAL at 08:22

## 2023-12-24 RX ADMIN — FLUOXETINE HYDROCHLORIDE 20 MG: 20 CAPSULE ORAL at 08:23

## 2023-12-24 RX ADMIN — TRAZODONE HYDROCHLORIDE 50 MG: 50 TABLET ORAL at 20:38

## 2023-12-24 RX ADMIN — DOCUSATE SODIUM 100 MG: 100 CAPSULE, LIQUID FILLED ORAL at 10:17

## 2023-12-24 RX ADMIN — HYDROXYZINE HYDROCHLORIDE 50 MG: 50 TABLET ORAL at 22:07

## 2023-12-24 RX ADMIN — OLANZAPINE 15 MG: 10 TABLET, FILM COATED ORAL at 20:38

## 2023-12-24 RX ADMIN — NITROFURANTOIN MONOHYDRATE/MACROCRYSTALS 100 MG: 75; 25 CAPSULE ORAL at 08:24

## 2023-12-24 RX ADMIN — DOCUSATE SODIUM 100 MG: 100 CAPSULE, LIQUID FILLED ORAL at 20:37

## 2023-12-24 NOTE — PLAN OF CARE
Goal Outcome Evaluation:  Plan of Care Reviewed With: patient  Patient Agreement with Plan of Care: agrees        Outcome Evaluation: Patient appetite and sleep good. Anxiety and depression 6/10. Patient denies any SI, HI, or AVH this shift. Patient admits to bowel movement this shift. Patient rates pain 9/10;medication discussed and given. Patient spend most of her time in room in bed. Patient did come out for snack.

## 2023-12-24 NOTE — PLAN OF CARE
"Goal Outcome Evaluation:  Plan of Care Reviewed With: patient  Patient Agreement with Plan of Care: agrees     Progress: improving  Outcome Evaluation: Pt states sleep and appetite \"good\". Pt rates anxiety 7/10 and depression 7/10. Pt eric SI and HI. When asked about hallucinations, pt states, \"I hear my brother's voice\". When asked what she heard her brother saying - pt responded that her brother just . Pt interacted appropriately with peers. Pt calm and cooperative with staff.         "

## 2023-12-24 NOTE — PROGRESS NOTES
"INPATIENT PSYCHIATRIC PROGRESS NOTE    Name:  Anna Moore  :  1970  MRN:  [id number]  Visit Number:  [id number]  Length of stay:  2    SUBJECTIVE    CC/Focus of Exam: Psychosis    INTERVAL HISTORY:  Patient with mild improvement in anxiety, psychomotor activity, but still continues to appear hypomanic and mildly psychotic, with ongoing paranoid delusions of voices in her home, hiding under the floor, intending harm to her.  Patient repeated that her brother passed away 2 days prior to hospitalization.  Patient repeated several statements that she made during yesterday's encounter, seemingly not remembering the conversation. Patient appears to be responding to internal stimuli, paranoid and anxious.    Depression rating 6/10  Anxiety rating 9/10  Sleep: Mildly improved  Withdrawal sx: Denied  Cravin/10    Review of Systems   Constitutional: Negative.    Respiratory: Negative.     Cardiovascular: Negative.    Gastrointestinal: Negative.    Musculoskeletal: Negative.    Psychiatric/Behavioral:  Positive for dysphoric mood, hallucinations and sleep disturbance. The patient is nervous/anxious.        OBJECTIVE    Temp:  [96.8 °F (36 °C)] 96.8 °F (36 °C)  Heart Rate:  [90-96] 96  Resp:  [18] 18  BP: (152-168)/(66-83) 168/83    MENTAL STATUS EXAM:  Appearance: Casually dressed, good hygeine.   Cooperation: Guarded  Psychomotor: No psychomotor agitation/retardation, No EPS, No motor tics  Speech: normal rate, amount.  Mood: \"Okay\"   Affect: congruent, restricted  Thought Content: goal directed, delusional material present  Thought process: linear, organized.  Suicidality: No SI  Homicidality: No HI  Perception: + AH/VH  Insight: Poor  Judgment: fair    Lab Results (last 24 hours)       Procedure Component Value Units Date/Time    Chlamydia trachomatis, Neisseria gonorrhoeae, Trichomonas vaginalis, PCR - Urine, Urine, Clean Catch [[id number]]  (Normal) Collected: 23 1248    Specimen: Urine, Clean Catch " Updated: 12/23/23 1503     Chlamydia DNA by PCR Not Detected     Neisseria gonorrhoeae by PCR Not Detected     Trichomonas vaginalis PCR Not Detected    Hepatitis Panel, Acute [[id number]]  (Abnormal) Collected: 12/23/23 0848    Specimen: Blood Updated: 12/23/23 1048     Hepatitis B Surface Ag Non-Reactive     Hep A IgM Non-Reactive     Hep B C IgM Non-Reactive     Hepatitis C Ab Reactive    Narrative:      Results may be falsely decreased if patient taking Biotin.     Basic Metabolic Panel [[id number]]  (Abnormal) Collected: 12/23/23 0848    Specimen: Blood Updated: 12/23/23 0941     Glucose 103 mg/dL      BUN 10 mg/dL      Creatinine 1.15 mg/dL      Sodium 139 mmol/L      Potassium 4.1 mmol/L      Chloride 102 mmol/L      CO2 25.7 mmol/L      Calcium 9.9 mg/dL      BUN/Creatinine Ratio 8.7     Anion Gap 11.3 mmol/L      eGFR 57.4 mL/min/1.73     Narrative:      GFR Normal >60  Chronic Kidney Disease <60  Kidney Failure <15                 Imaging Results (Last 24 Hours)       ** No results found for the last 24 hours. **               ECG/EMG Results (most recent)       None             ALLERGIES: Codeine      Current Facility-Administered Medications:     acetaminophen (TYLENOL) tablet 650 mg, 650 mg, Oral, Q6H PRN, Oumar Deutsch MD, 650 mg at 12/23/23 2152    aluminum-magnesium hydroxide-simethicone (MAALOX MAX) 400-400-40 MG/5ML suspension 15 mL, 15 mL, Oral, Q6H PRN, Oumar Deutsch MD    benzonatate (TESSALON) capsule 100 mg, 100 mg, Oral, TID PRN, Oumar Deutsch MD    benztropine (COGENTIN) tablet 2 mg, 2 mg, Oral, Once PRN **OR** benztropine (COGENTIN) injection 1 mg, 1 mg, Intramuscular, Once PRN, Oumar Deutsch MD    buprenorphine-naloxone (SUBOXONE) 8-2 MG per SL tablet 2 tablet, 2 tablet, Sublingual, Daily, Trevor Juarez MD, 2 tablet at 12/24/23 0822    cefdinir (OMNICEF) capsule 300 mg, 300 mg, Oral, Q12H, Oumar Deutsch MD, 300 mg at 12/24/23 0823    famotidine (PEPCID)  tablet 20 mg, 20 mg, Oral, BID PRN, Oumar Deutsch MD    FLUoxetine (PROzac) capsule 20 mg, 20 mg, Oral, Daily, Trevor Juarez MD, 20 mg at 12/24/23 0823    hydrOXYzine (ATARAX) tablet 50 mg, 50 mg, Oral, Q6H PRN, Oumar Deutsch MD, 50 mg at 12/24/23 0655    ibuprofen (ADVIL,MOTRIN) tablet 400 mg, 400 mg, Oral, Q6H PRN, Oumar Deutsch MD, 400 mg at 12/24/23 0655    loperamide (IMODIUM) capsule 2 mg, 2 mg, Oral, Q2H PRN, Oumar Deutsch MD    magnesium hydroxide (MILK OF MAGNESIA) suspension 10 mL, 10 mL, Oral, Daily PRN, Oumar Deutsch MD    nicotine (NICODERM CQ) 21 MG/24HR patch 1 patch, 1 patch, Transdermal, Q24H, Oumar Deutsch MD, 1 patch at 12/24/23 0825    nitrofurantoin (macrocrystal-monohydrate) (MACROBID) capsule 100 mg, 100 mg, Oral, Q12H, Trevor Juarez MD, 100 mg at 12/24/23 0824    OLANZapine (zyPREXA) tablet 10 mg, 10 mg, Oral, Nightly, Trevor Juarez MD, 10 mg at 12/23/23 2037    ondansetron (ZOFRAN) tablet 4 mg, 4 mg, Oral, Q6H PRN, Oumar Deutsch MD    prazosin (MINIPRESS) capsule 1 mg, 1 mg, Oral, Nightly, Trevor Juarez MD, 1 mg at 12/23/23 2037    sodium chloride nasal spray 2 spray, 2 spray, Each Nare, PRN, Oumar Deutsch MD    traZODone (DESYREL) tablet 50 mg, 50 mg, Oral, Nightly PRN, Oumar Deutsch MD    Reviewed chart, notes, vitals, labs and EKG personally reviewed.    ASSESSMENT & PLAN:    Schizoaffective disorder, bipolar type  -Patient has been off psychiatric medications for some time.  At time of last hospitalization patient was stabilized with olanzapine 20mg QHS, Seroquel 100mg QHS, Prozac 20mg and prazosin 1mg QHS.  -Increase olanzapine to 15 mg  -Hold quetiapine for now  -Continue fluoxetine 20 mg daily with plan to titrate back to 40 mg daily  -We will establish outpatient psychiatric care following hospitalization     Post Traumatic Stress Disorder  - Restart prazosin 1mg QHS  -Fluoxetine as above  - Refer for trauma  focused therapy     Opioid use disorder, severe, dependence, on maintenance medication  -Admission UDS positive for buprenorphine.  PDMP data reviewed.  -Continue Suboxone 16 mg daily  -We will refer for substance abuse treatment following hospitalization     Urinary tract infection  -UA with 4+ bacteria, 11-20 WBC, positive nitrite, small leukocyte.  -Culture pending  -Order STI panel     Hyponatremia  - Na 135 on admission  - Encourage po intake and repeat BMP in AM     Elevated WBC  - 13.55 on admission, may be secondary to UTI or stress induced  - Monitor, repeat CBC with mild improvement to 12.37      Special precautions: Special Precautions Level 3 (q15 min checks)     Behavioral Health Treatment Plan and Problem List: I have reviewed and approved the Behavioral Health Treatment Plan and Problem list.  The patient has had a chance to review and agrees with the treatment plan.    I have reviewed the copied text and it is accurate as of 12/24/23     Clinician:  Tee Potter MD  12/24/23  09:28 EST

## 2023-12-25 PROCEDURE — 99232 SBSQ HOSP IP/OBS MODERATE 35: CPT | Performed by: PSYCHIATRY & NEUROLOGY

## 2023-12-25 RX ORDER — FLUOXETINE HYDROCHLORIDE 20 MG/1
40 CAPSULE ORAL DAILY
Status: DISCONTINUED | OUTPATIENT
Start: 2023-12-26 | End: 2023-12-31 | Stop reason: HOSPADM

## 2023-12-25 RX ADMIN — BUPRENORPHINE HYDROCHLORIDE AND NALOXONE HYDROCHLORIDE DIHYDRATE 2 TABLET: 8; 2 TABLET SUBLINGUAL at 08:24

## 2023-12-25 RX ADMIN — FLUOXETINE HYDROCHLORIDE 20 MG: 20 CAPSULE ORAL at 08:24

## 2023-12-25 RX ADMIN — OLANZAPINE 15 MG: 10 TABLET, FILM COATED ORAL at 21:24

## 2023-12-25 RX ADMIN — ACETAMINOPHEN 650 MG: 325 TABLET ORAL at 08:26

## 2023-12-25 RX ADMIN — NICOTINE TRANSDERMAL SYSTEM 1 PATCH: 21 PATCH, EXTENDED RELEASE TRANSDERMAL at 08:25

## 2023-12-25 RX ADMIN — IBUPROFEN 400 MG: 400 TABLET, FILM COATED ORAL at 14:34

## 2023-12-25 RX ADMIN — CEFDINIR 300 MG: 300 CAPSULE ORAL at 21:24

## 2023-12-25 RX ADMIN — PRAZOSIN HYDROCHLORIDE 1 MG: 1 CAPSULE ORAL at 21:24

## 2023-12-25 RX ADMIN — NITROFURANTOIN MONOHYDRATE/MACROCRYSTALS 100 MG: 75; 25 CAPSULE ORAL at 21:24

## 2023-12-25 RX ADMIN — HYDROXYZINE HYDROCHLORIDE 50 MG: 50 TABLET ORAL at 14:34

## 2023-12-25 RX ADMIN — TRAZODONE HYDROCHLORIDE 50 MG: 50 TABLET ORAL at 21:24

## 2023-12-25 RX ADMIN — CEFDINIR 300 MG: 300 CAPSULE ORAL at 08:24

## 2023-12-25 RX ADMIN — IBUPROFEN 400 MG: 400 TABLET, FILM COATED ORAL at 21:24

## 2023-12-25 RX ADMIN — DOCUSATE SODIUM 100 MG: 100 CAPSULE, LIQUID FILLED ORAL at 08:24

## 2023-12-25 RX ADMIN — DOCUSATE SODIUM 100 MG: 100 CAPSULE, LIQUID FILLED ORAL at 21:24

## 2023-12-25 RX ADMIN — NITROFURANTOIN MONOHYDRATE/MACROCRYSTALS 100 MG: 75; 25 CAPSULE ORAL at 08:24

## 2023-12-25 NOTE — PLAN OF CARE
Goal Outcome Evaluation:  Plan of Care Reviewed With: patient  Patient Agreement with Plan of Care: agrees     Progress: improving  Outcome Evaluation: Pt calm and cooperative with staff. Pt interacts appropriately with peers. Pt states sleep and appetite good. Pt rates anxiety 6/10 and depression 6/10. Pt denies SI/HI, and AVH.

## 2023-12-25 NOTE — PROGRESS NOTES
"INPATIENT PSYCHIATRIC PROGRESS NOTE    Name:  Anna Moore  :  1970  MRN:  [id number]  Visit Number:  [id number]  Length of stay:  3    SUBJECTIVE    CC/Focus of Exam: Psychosis    INTERVAL HISTORY:  Patient with continued improvement in psychomotor activity, manic symptoms. Still appears anxious, on edge. She reports paranoia improving, but also appears reserved and as if she is struggling to engage with assessments as she has during previous hospitalizations.     Depression rating 5/10  Anxiety rating 8/10  Sleep: Mildly improved  Withdrawal sx: Denied  Cravin/10    Review of Systems   Constitutional: Negative.    Respiratory: Negative.     Cardiovascular: Negative.    Gastrointestinal: Negative.    Musculoskeletal: Negative.    Psychiatric/Behavioral:  Positive for dysphoric mood, hallucinations and sleep disturbance. The patient is nervous/anxious.        OBJECTIVE    Temp:  [97.5 °F (36.4 °C)-97.9 °F (36.6 °C)] 97.9 °F (36.6 °C)  Heart Rate:  [64-86] 86  Resp:  [18] 18  BP: (142-164)/(75-84) 142/84    MENTAL STATUS EXAM:  Appearance: Casually dressed, good hygeine.   Cooperation: Guarded  Psychomotor: No psychomotor agitation/retardation, No EPS, No motor tics  Speech: normal rate, amount.  Mood: \"fine\"   Affect: congruent, restricted  Thought Content: goal directed, delusional material present but reportedly improving  Thought process: linear, organized.  Suicidality: No SI  Homicidality: No HI  Perception: denies AH/VH  Insight: Poor  Judgment: fair    Lab Results (last 24 hours)       ** No results found for the last 24 hours. **               Imaging Results (Last 24 Hours)       ** No results found for the last 24 hours. **               ECG/EMG Results (most recent)       None             ALLERGIES: Codeine      Current Facility-Administered Medications:     acetaminophen (TYLENOL) tablet 650 mg, 650 mg, Oral, Q6H PRN, Oumar Deutsch MD, 650 mg at 23 2152    aluminum-magnesium " hydroxide-simethicone (MAALOX MAX) 400-400-40 MG/5ML suspension 15 mL, 15 mL, Oral, Q6H PRN, Oumar Deutsch MD    benzonatate (TESSALON) capsule 100 mg, 100 mg, Oral, TID PRN, Oumar Deutsch MD    benztropine (COGENTIN) tablet 2 mg, 2 mg, Oral, Once PRN **OR** benztropine (COGENTIN) injection 1 mg, 1 mg, Intramuscular, Once PRN, Oumar Deutsch MD    buprenorphine-naloxone (SUBOXONE) 8-2 MG per SL tablet 2 tablet, 2 tablet, Sublingual, Daily, Trevor Juarez MD, 2 tablet at 12/24/23 0822    cefdinir (OMNICEF) capsule 300 mg, 300 mg, Oral, Q12H, Oumar Deutsch MD, 300 mg at 12/24/23 2038    docusate sodium (COLACE) capsule 100 mg, 100 mg, Oral, BID, Tee Potter MD, 100 mg at 12/24/23 2037    famotidine (PEPCID) tablet 20 mg, 20 mg, Oral, BID PRN, Oumar Deutsch MD    FLUoxetine (PROzac) capsule 20 mg, 20 mg, Oral, Daily, Trevor Juarez MD, 20 mg at 12/24/23 0823    hydrOXYzine (ATARAX) tablet 50 mg, 50 mg, Oral, Q6H PRN, Oumar Deutsch MD, 50 mg at 12/24/23 2207    ibuprofen (ADVIL,MOTRIN) tablet 400 mg, 400 mg, Oral, Q6H PRN, Oumar Deutsch MD, 400 mg at 12/24/23 1319    loperamide (IMODIUM) capsule 2 mg, 2 mg, Oral, Q2H PRN, Oumar Deutsch MD    magnesium hydroxide (MILK OF MAGNESIA) suspension 10 mL, 10 mL, Oral, Daily PRN, Oumar Deutsch MD    nicotine (NICODERM CQ) 21 MG/24HR patch 1 patch, 1 patch, Transdermal, Q24H, Oumar Deutsch MD, 1 patch at 12/24/23 0825    nitrofurantoin (macrocrystal-monohydrate) (MACROBID) capsule 100 mg, 100 mg, Oral, Q12H, Trevor Juarez MD, 100 mg at 12/24/23 2038    OLANZapine (zyPREXA) tablet 15 mg, 15 mg, Oral, Nightly, Tee Potter MD, 15 mg at 12/24/23 2038    ondansetron (ZOFRAN) tablet 4 mg, 4 mg, Oral, Q6H PRN, Oumar Deutsch MD    prazosin (MINIPRESS) capsule 1 mg, 1 mg, Oral, Nightly, Trevor Juarez MD, 1 mg at 12/24/23 2037    sodium chloride nasal spray 2 spray, 2 spray, Each Nare, PRN, Get  Oumar THAKKAR MD    traZODone (DESYREL) tablet 50 mg, 50 mg, Oral, Nightly PRN, Get, Oumar THAKKAR MD, 50 mg at 12/24/23 2038    Reviewed chart, notes, vitals, labs and EKG personally reviewed.    ASSESSMENT & PLAN:    Schizoaffective disorder, bipolar type  -Patient has been off psychiatric medications for some time.  At time of last hospitalization patient was stabilized with olanzapine 20mg QHS, Seroquel 100mg QHS, Prozac 20mg and prazosin 1mg QHS.  -Increased olanzapine to 15 mg on 12/24/23  -Hold quetiapine for now  -Continue fluoxetine 20 mg daily with plan to titrate back to 40 mg daily  -We will establish outpatient psychiatric care following hospitalization     Post Traumatic Stress Disorder  - Restart prazosin 1mg QHS  -Fluoxetine as above  - Refer for trauma focused therapy     Opioid use disorder, severe, dependence, on maintenance medication  -Admission UDS positive for buprenorphine.  PDMP data reviewed.  -Continue Suboxone 16 mg daily  -We will refer for substance abuse treatment following hospitalization     Urinary tract infection  -UA with 4+ bacteria, 11-20 WBC, positive nitrite, small leukocyte.  -Culture pending  -Order STI panel     Hyponatremia  - Na 135 on admission  - Encourage po intake and repeat BMP in AM     Elevated WBC  - 13.55 on admission, may be secondary to UTI or stress induced  - Monitor, repeat CBC with mild improvement to 12.37      Special precautions: Special Precautions Level 3 (q15 min checks)     Behavioral Health Treatment Plan and Problem List: I have reviewed and approved the Behavioral Health Treatment Plan and Problem list.  The patient has had a chance to review and agrees with the treatment plan.    I have reviewed the copied text and it is accurate as of 12/25/23     Clinician:  Tee Potter MD  12/25/23  08:17 EST

## 2023-12-25 NOTE — PLAN OF CARE
Goal Outcome Evaluation:  Plan of Care Reviewed With: patient  Patient Agreement with Plan of Care: agrees     Progress: improving  Outcome Evaluation: Pt has spent free time in the day room tonight watching TV. Patient denies SI, HI and AVH. Patient rates anxiety 7 and depression 6. She voices that she is feeling better this evening. Patient voices no other complaints.

## 2023-12-26 PROCEDURE — 99232 SBSQ HOSP IP/OBS MODERATE 35: CPT | Performed by: PSYCHIATRY & NEUROLOGY

## 2023-12-26 RX ORDER — QUETIAPINE FUMARATE 100 MG/1
50 TABLET, FILM COATED ORAL NIGHTLY
Status: DISCONTINUED | OUTPATIENT
Start: 2023-12-26 | End: 2023-12-27

## 2023-12-26 RX ORDER — PRAZOSIN HYDROCHLORIDE 1 MG/1
2 CAPSULE ORAL NIGHTLY
Status: DISCONTINUED | OUTPATIENT
Start: 2023-12-26 | End: 2023-12-31 | Stop reason: HOSPADM

## 2023-12-26 RX ADMIN — PRAZOSIN HYDROCHLORIDE 2 MG: 1 CAPSULE ORAL at 20:13

## 2023-12-26 RX ADMIN — QUETIAPINE FUMARATE 50 MG: 100 TABLET ORAL at 20:13

## 2023-12-26 RX ADMIN — HYDROXYZINE HYDROCHLORIDE 50 MG: 50 TABLET ORAL at 21:56

## 2023-12-26 RX ADMIN — CEFDINIR 300 MG: 300 CAPSULE ORAL at 20:14

## 2023-12-26 RX ADMIN — DOCUSATE SODIUM 100 MG: 100 CAPSULE, LIQUID FILLED ORAL at 08:30

## 2023-12-26 RX ADMIN — NITROFURANTOIN MONOHYDRATE/MACROCRYSTALS 100 MG: 75; 25 CAPSULE ORAL at 08:30

## 2023-12-26 RX ADMIN — OLANZAPINE 15 MG: 10 TABLET, FILM COATED ORAL at 20:13

## 2023-12-26 RX ADMIN — BUPRENORPHINE HYDROCHLORIDE AND NALOXONE HYDROCHLORIDE DIHYDRATE 2 TABLET: 8; 2 TABLET SUBLINGUAL at 08:30

## 2023-12-26 RX ADMIN — CEFDINIR 300 MG: 300 CAPSULE ORAL at 08:31

## 2023-12-26 RX ADMIN — FLUOXETINE HYDROCHLORIDE 40 MG: 20 CAPSULE ORAL at 08:29

## 2023-12-26 RX ADMIN — NICOTINE TRANSDERMAL SYSTEM 1 PATCH: 21 PATCH, EXTENDED RELEASE TRANSDERMAL at 08:29

## 2023-12-26 RX ADMIN — DOCUSATE SODIUM 100 MG: 100 CAPSULE, LIQUID FILLED ORAL at 20:13

## 2023-12-26 NOTE — PLAN OF CARE
Goal Outcome Evaluation:  Plan of Care Reviewed With: patient, guardian  Patient Agreement with Plan of Care: agrees  Consent Given to Review Plan with: Signed consent for her significant other, Jurgen and sonLuigi.  Progress: improving  Outcome Evaluation: Therapist met with Patient one-on-one.    Problem: Adult Behavioral Health Plan of Care  Goal: Plan of Care Review  Outcome: Ongoing, Progressing  Flowsheets (Taken 12/26/2023 1102)  Consent Given to Review Plan with: Signed consent for her significant other, Jurgen and sonLuigi.  Progress: improving  Plan of Care Reviewed With:   patient   guardian  Patient Agreement with Plan of Care: agrees  Outcome Evaluation: Therapist met with Patient one-on-one.  Goal: Optimized Coping Skills in Response to Life Stressors  Outcome: Ongoing, Progressing  Flowsheets (Taken 12/26/2023 1102)  Optimized Coping Skills in Response to Life Stressors: making progress toward outcome  Intervention: Promote Effective Coping Strategies  Flowsheets (Taken 12/26/2023 1102)  Supportive Measures:   active listening utilized   counseling provided   decision-making supported   positive reinforcement provided   verbalization of feelings encouraged  Goal: Develops/Participates in Therapeutic [place] to Support Successful Transition  Outcome: Ongoing, Progressing  Flowsheets (Taken 12/26/2023 1102)  Develops/Participates in Therapeutic [place] to Support Successful Transition: making progress toward outcome  Intervention: Foster Therapeutic [place]  Flowsheets (Taken 12/26/2023 1102)  Trust Relationship/Rapport:   care explained   questions answered   choices provided   questions encouraged   emotional support provided   reassurance provided   empathic listening provided   thoughts/feelings acknowledged  Intervention: Mutually Develop Transition Plan  Flowsheets (Taken 12/26/2023 1102)  Transition Support:   community resources reviewed   follow-up care coordinated   crisis management plan  "promoted   follow-up care discussed   crisis management plan verbalized    DATA: Therapist met with Patient individually this date. Patient agreeable to discuss current treatment progress and discharge concerns.     Patient signed consent for her significant other, Jurgen and son, Luigi.    Therapist spoke with Patient's significant other, Jurgen on this date. He states Patient has been increasingly paranoid lately and says she hears and sees things that are not there. He states she stays nervous and anxious. Jurgen reports improvement in symptoms when Patient is compliant with her medications.     Completed safety planning and Jurgen confirmed Patient does not have access to any firearms in the home. He reports the house is safeguarded to the best possible extent.       CLINICAL MANUVERING/INTERVENTIONS:  Assisted Patient in processing session content; acknowledged and normalized Patient’s thoughts, feelings, and concerns by utilizing a person-centered approach in efforts to build appropriate rapport and a positive therapeutic relationship with open and honest communication. Allowed Patient to ventilate regarding current stressors and triggers for negative emotions and thoughts in a safe nonjudgmental environment with unconditional positive regard, active listening skills, and empathy.    ASSESSMENT: Patient was seen today for a follow-up. She reports improvement in mood and anxiety. Patient continues to be mildly anxious and paranoid but states symptoms are improving. She reports worrying someone will \"shoot her in the back of the head\" because her brother used to play around and act like he was going to. Patient remains reserved and struggles to fully engage. She was hyperfocused on where she will get her medications once she leaves the hospital. Denies SI or further visual or auditory hallucinations.    PLAN: Patient will continue stabilization. Patient will continue to receive services offered by Treatment Team. "     Patient will follow-up with [facility].     Assistance with transportation will not be needed. Family member will provide.

## 2023-12-26 NOTE — DISCHARGE INSTR - APPOINTMENTS
[facility]  [facility] [address] [id number]  [phone]    Thursday, January 4, 2023 at 10:00 AM with Chelsea.   0

## 2023-12-26 NOTE — PROGRESS NOTES
"INPATIENT PSYCHIATRIC PROGRESS NOTE    Name:  Anna Moore  :  1970  MRN:  [id number]  Visit Number:  [id number]  Length of stay:  4    SUBJECTIVE    CC/Focus of Exam: Psychosis    INTERVAL HISTORY:  Patient appears more anxious today, was seen more delusional content related to voices and people under and around her home that intend harm to her.  She reports staying in that home because her boyfriend lives there; unsure if they could move or if she wants to.  Appears somewhat disorganized and aimless today.  She spoke to her son briefly yesterday.  She reports previously doing better when we added quetiapine during the last hospitalization, so we will resume it tonight and monitor for improvement.      Depression rating 5/10  Anxiety rating 9/10  Sleep: Worse last night  Withdrawal sx: Denied  Cravin/10    Review of Systems   Constitutional: Negative.    Respiratory: Negative.     Cardiovascular: Negative.    Gastrointestinal: Negative.    Musculoskeletal: Negative.    Psychiatric/Behavioral:  Positive for dysphoric mood, hallucinations and sleep disturbance. The patient is nervous/anxious.        OBJECTIVE    Temp:  [96.8 °F (36 °C)-96.9 °F (36.1 °C)] 96.8 °F (36 °C)  Heart Rate:  [61-63] 63  Resp:  [16-18] 16  BP: (152-161)/(71-73) 152/73    MENTAL STATUS EXAM:  Appearance: Casually dressed, good hygeine.   Cooperation: Guarded  Psychomotor: No psychomotor agitation/retardation, No EPS, No motor tics  Speech: normal rate, amount.  Mood: \"Not good today\"   Affect: congruent, restricted  Thought Content: goal directed, delusional material worse  Thought process: Mildly disorganized  Suicidality: No SI  Homicidality: No HI  Perception: denies AH/VH, but still occasionally appears like she is responding to internal stimuli  Insight: Fair  Judgment: fair    Lab Results (last 24 hours)       ** No results found for the last 24 hours. **               Imaging Results (Last 24 Hours)       ** No results found " for the last 24 hours. **               ECG/EMG Results (most recent)       None             ALLERGIES: Codeine      Current Facility-Administered Medications:     acetaminophen (TYLENOL) tablet 650 mg, 650 mg, Oral, Q6H PRN, Oumar Deutsch MD, 650 mg at 12/25/23 0826    aluminum-magnesium hydroxide-simethicone (MAALOX MAX) 400-400-40 MG/5ML suspension 15 mL, 15 mL, Oral, Q6H PRN, Oumar Deutsch MD    benzonatate (TESSALON) capsule 100 mg, 100 mg, Oral, TID PRN, Oumar Deutsch MD    benztropine (COGENTIN) tablet 2 mg, 2 mg, Oral, Once PRN **OR** benztropine (COGENTIN) injection 1 mg, 1 mg, Intramuscular, Once PRN, Oumar Deutsch MD    buprenorphine-naloxone (SUBOXONE) 8-2 MG per SL tablet 2 tablet, 2 tablet, Sublingual, Daily, Trevor Juarez MD, 2 tablet at 12/26/23 0830    cefdinir (OMNICEF) capsule 300 mg, 300 mg, Oral, Q12H, Oumar Deutsch MD, 300 mg at 12/26/23 0831    docusate sodium (COLACE) capsule 100 mg, 100 mg, Oral, BID, Tee Potter MD, 100 mg at 12/26/23 0830    famotidine (PEPCID) tablet 20 mg, 20 mg, Oral, BID PRN, Oumar Deutsch MD    FLUoxetine (PROzac) capsule 40 mg, 40 mg, Oral, Daily, Tee Potter MD, 40 mg at 12/26/23 0829    hydrOXYzine (ATARAX) tablet 50 mg, 50 mg, Oral, Q6H PRN, Oumar Deutsch MD, 50 mg at 12/25/23 1434    ibuprofen (ADVIL,MOTRIN) tablet 400 mg, 400 mg, Oral, Q6H PRN, Oumar Deutsch MD, 400 mg at 12/25/23 2124    loperamide (IMODIUM) capsule 2 mg, 2 mg, Oral, Q2H PRN, Oumar Deutsch MD    magnesium hydroxide (MILK OF MAGNESIA) suspension 10 mL, 10 mL, Oral, Daily PRN, Oumar Deutsch MD    nicotine (NICODERM CQ) 21 MG/24HR patch 1 patch, 1 patch, Transdermal, Q24H, Oumar Deutsch MD, 1 patch at 12/26/23 0829    nitrofurantoin (macrocrystal-monohydrate) (MACROBID) capsule 100 mg, 100 mg, Oral, Q12H, Trevor Juarez MD, 100 mg at 12/26/23 0830    OLANZapine (zyPREXA) tablet 15 mg, 15 mg, Oral, Nightly, Bishop  Tee TOLEDO MD, 15 mg at 12/25/23 2124    ondansetron (ZOFRAN) tablet 4 mg, 4 mg, Oral, Q6H PRN, Oumar Deutsch MD    prazosin (MINIPRESS) capsule 1 mg, 1 mg, Oral, Nightly, Trevor Juarez MD, 1 mg at 12/25/23 2124    sodium chloride nasal spray 2 spray, 2 spray, Each Nare, PRN, Oumar Deutsch MD    traZODone (DESYREL) tablet 50 mg, 50 mg, Oral, Nightly PRN, Oumar Deutsch MD, 50 mg at 12/25/23 2124    Reviewed chart, notes, vitals, labs and EKG personally reviewed.    ASSESSMENT & PLAN:    Schizoaffective disorder, bipolar type  -Patient has been off psychiatric medications for some time.  At time of last hospitalization patient was stabilized with olanzapine 20mg QHS, Seroquel 100mg QHS, Prozac 20mg and prazosin 1mg QHS.  -Increased olanzapine to 15 mg on 12/24/23  -Patient reports symptoms improved more rapidly with the addition of quetiapine during last hospitalization.  We will start quetiapine 50 mg nightly tonight  -Increase fluoxetine to 40 mg daily  -We will establish outpatient psychiatric care following hospitalization     Post Traumatic Stress Disorder  -Increase prazosin to 2 mg QHS  -Fluoxetine as above  -Refer for trauma focused therapy     Opioid use disorder, severe, dependence, on maintenance medication  -Admission UDS positive for buprenorphine.  PDMP data reviewed.  -Continue Suboxone 16 mg daily  -We will refer for substance abuse treatment following hospitalization     Urinary tract infection  -UA with 4+ bacteria, 11-20 WBC, positive nitrite, small leukocyte.  -Culture growing E. coli, susceptible to cephalosporins  -Continue Omnicef  -STI panel negative     Hyponatremia  -Na 135 on admission  -Improved to 139     Elevated WBC  - 13.55 on admission, may be secondary to UTI or stress induced  - Monitor, repeat CBC with mild improvement to 12.37.  No clinical signs of infection at this time    Hepatitis C  -Antibody positive      Special precautions: Special Precautions Level 3  (q15 min checks)     Behavioral Health Treatment Plan and Problem List: I have reviewed and approved the Behavioral Health Treatment Plan and Problem list.  The patient has had a chance to review and agrees with the treatment plan.    I have reviewed the copied text and it is accurate as of 12/26/23     Clinician:  Tee Potter MD  12/26/23  11:54 EST

## 2023-12-26 NOTE — PLAN OF CARE
Goal Outcome Evaluation:  Plan of Care Reviewed With: patient  Patient Agreement with Plan of Care: agrees      Pt rated anxiety 6/10 and depression 6/10. Pt denies SI/HI/AVH. Pt doesn't voice any concerns at this time. No acute s/s of distress noted.

## 2023-12-27 PROCEDURE — 99232 SBSQ HOSP IP/OBS MODERATE 35: CPT | Performed by: PSYCHIATRY & NEUROLOGY

## 2023-12-27 PROCEDURE — 63710000001 ONDANSETRON PER 8 MG: Performed by: PSYCHIATRY & NEUROLOGY

## 2023-12-27 RX ORDER — OLANZAPINE 10 MG/1
20 TABLET ORAL NIGHTLY
Status: DISCONTINUED | OUTPATIENT
Start: 2023-12-27 | End: 2023-12-31 | Stop reason: HOSPADM

## 2023-12-27 RX ORDER — QUETIAPINE FUMARATE 100 MG/1
100 TABLET, FILM COATED ORAL NIGHTLY
Status: DISCONTINUED | OUTPATIENT
Start: 2023-12-27 | End: 2023-12-28

## 2023-12-27 RX ORDER — TRAZODONE HYDROCHLORIDE 50 MG/1
100 TABLET ORAL NIGHTLY PRN
Status: DISCONTINUED | OUTPATIENT
Start: 2023-12-27 | End: 2023-12-31 | Stop reason: HOSPADM

## 2023-12-27 RX ADMIN — NICOTINE TRANSDERMAL SYSTEM 1 PATCH: 21 PATCH, EXTENDED RELEASE TRANSDERMAL at 08:02

## 2023-12-27 RX ADMIN — TRAZODONE HYDROCHLORIDE 100 MG: 50 TABLET ORAL at 21:04

## 2023-12-27 RX ADMIN — CEFDINIR 300 MG: 300 CAPSULE ORAL at 08:01

## 2023-12-27 RX ADMIN — DOCUSATE SODIUM 100 MG: 100 CAPSULE, LIQUID FILLED ORAL at 21:04

## 2023-12-27 RX ADMIN — ONDANSETRON HYDROCHLORIDE 4 MG: 4 TABLET, FILM COATED ORAL at 11:46

## 2023-12-27 RX ADMIN — DOCUSATE SODIUM 100 MG: 100 CAPSULE, LIQUID FILLED ORAL at 08:01

## 2023-12-27 RX ADMIN — PRAZOSIN HYDROCHLORIDE 2 MG: 1 CAPSULE ORAL at 21:04

## 2023-12-27 RX ADMIN — CEFDINIR 300 MG: 300 CAPSULE ORAL at 21:03

## 2023-12-27 RX ADMIN — FLUOXETINE HYDROCHLORIDE 40 MG: 20 CAPSULE ORAL at 08:01

## 2023-12-27 RX ADMIN — HYDROXYZINE HYDROCHLORIDE 50 MG: 50 TABLET ORAL at 14:59

## 2023-12-27 RX ADMIN — IBUPROFEN 400 MG: 400 TABLET, FILM COATED ORAL at 21:03

## 2023-12-27 RX ADMIN — QUETIAPINE FUMARATE 100 MG: 100 TABLET ORAL at 21:04

## 2023-12-27 RX ADMIN — HYDROXYZINE HYDROCHLORIDE 50 MG: 50 TABLET ORAL at 21:04

## 2023-12-27 RX ADMIN — IBUPROFEN 400 MG: 400 TABLET, FILM COATED ORAL at 15:01

## 2023-12-27 RX ADMIN — BUPRENORPHINE HYDROCHLORIDE AND NALOXONE HYDROCHLORIDE DIHYDRATE 2 TABLET: 8; 2 TABLET SUBLINGUAL at 08:01

## 2023-12-27 RX ADMIN — TRAZODONE HYDROCHLORIDE 50 MG: 50 TABLET ORAL at 01:53

## 2023-12-27 RX ADMIN — OLANZAPINE 20 MG: 10 TABLET, FILM COATED ORAL at 21:04

## 2023-12-27 NOTE — PLAN OF CARE
Goal Outcome Evaluation:  Plan of Care Reviewed With: patient, significant other, son  Patient Agreement with Plan of Care: agrees  Consent Given to Review Plan with: Signed consent for her significant other, Jurgen and son, Luigi.  Progress: improving  Outcome Evaluation: Therapist met with Patient one-on-one.    Problem: Adult Behavioral Health Plan of Care  Goal: Plan of Care Review  Outcome: Ongoing, Progressing  Flowsheets  Taken 12/27/2023 1112  Progress: improving  Plan of Care Reviewed With:   patient   significant other   son  Patient Agreement with Plan of Care: agrees  Outcome Evaluation: Therapist met with Patient one-on-one.  Taken 12/26/2023 1102  Consent Given to Review Plan with: Signed consent for her significant other, Jurgen and sonLuigi.  Goal: Optimized Coping Skills in Response to Life Stressors  Outcome: Ongoing, Progressing  Flowsheets (Taken 12/27/2023 1112)  Optimized Coping Skills in Response to Life Stressors: making progress toward outcome  Intervention: Promote Effective Coping Strategies  Flowsheets (Taken 12/27/2023 1112)  Supportive Measures:   active listening utilized   counseling provided   decision-making supported   positive reinforcement provided   verbalization of feelings encouraged  Goal: Develops/Participates in Therapeutic [place] to Support Successful Transition  Outcome: Ongoing, Progressing  Flowsheets (Taken 12/27/2023 1112)  Develops/Participates in Therapeutic [place] to Support Successful Transition: making progress toward outcome  Intervention: Foster Therapeutic [place]  Flowsheets (Taken 12/27/2023 1112)  Trust Relationship/Rapport:   care explained   questions answered   choices provided   questions encouraged   emotional support provided   reassurance provided   empathic listening provided   thoughts/feelings acknowledged  Intervention: Mutually Develop Transition Plan  Flowsheets (Taken 12/27/2023 1112)  Transition Support:   community resources reviewed    follow-up care coordinated   crisis management plan promoted   follow-up care discussed   crisis management plan verbalized    DATA: Therapist met with Patient individually this date. Patient agreeable to discuss current treatment progress and discharge concerns.     CLINICAL MANUVERING/INTERVENTIONS:  Assisted Patient in processing session content; acknowledged and normalized Patient’s thoughts, feelings, and concerns by utilizing a person-centered approach in efforts to build appropriate rapport and a positive therapeutic relationship with open and honest communication. Allowed Patient to ventilate regarding current stressors and triggers for negative emotions and thoughts in a safe nonjudgmental environment with unconditional positive regard, active listening skills, and empathy.     ASSESSMENT: Patient was seen today for a follow-up. She reports mild improvement in mood and anxiety, but states she slept poorly last night. Patient continues to report delusions of people in her home trying to harm her. She states her sleep medications are being adjusted and is hopeful this will be beneficial.    PLAN: Patient will continue stabilization. Patient will continue to receive services offered by Treatment Team.     Patient will follow-up with [facility].      Assistance with transportation will not be needed. Family member will provide.

## 2023-12-27 NOTE — PLAN OF CARE
Goal Outcome Evaluation:  Plan of Care Reviewed With: patient  Patient Agreement with Plan of Care: agrees     Progress: improving  Outcome Evaluation: Pt reports anxiety and depression 7/10. Pt denies SI/HI/AVH. Pt reports eating and sleeping well.

## 2023-12-27 NOTE — PROGRESS NOTES
"INPATIENT PSYCHIATRIC PROGRESS NOTE    Name:  Anna Moore  :  1970  MRN:  [id number]  Visit Number:  [id number]  Length of stay:  5    SUBJECTIVE    CC/Focus of Exam: Psychosis    INTERVAL HISTORY:  Jaimie continues to appear anxious, on edge, citing racing thoughts, intrusive delusions of danger at her home and people there to harm her.  Symptoms now worsening her sleep, with multiple interruptions last night and she appears significantly fatigued this morning.  She reports some concerns about where she will return following hospitalization, wondering if that home with her boyfriend is the best place for her.  Patient appears distressed this morning.    Depression rating 6/10  Anxiety rating 9/10  Sleep: Poor  Withdrawal sx: Denied  Cravin/10    Review of Systems   Constitutional: Negative.    Respiratory: Negative.     Cardiovascular: Negative.    Gastrointestinal: Negative.    Musculoskeletal: Negative.    Psychiatric/Behavioral:  Positive for dysphoric mood, hallucinations and sleep disturbance. The patient is nervous/anxious.        OBJECTIVE    Temp:  [97.8 °F (36.6 °C)] 97.8 °F (36.6 °C)  Heart Rate:  [77-82] 82  Resp:  [18] 18  BP: (116-155)/(60-70) 123/65    MENTAL STATUS EXAM:  Appearance: Casually dressed, good hygeine.   Cooperation: Guarded  Psychomotor: Mild psychomotor retardation, No EPS, No motor tics  Speech: Decreased rate, amount.  Mood: \"Worse today\"   Affect: congruent, restricted  Thought Content: delusional material worse  Thought process: Mildly disorganized  Suicidality: No SI  Homicidality: No HI  Perception: denies AH/VH, but still occasionally appears like she is responding to internal stimuli  Insight: Fair  Judgment: fair    Lab Results (last 24 hours)       ** No results found for the last 24 hours. **               Imaging Results (Last 24 Hours)       ** No results found for the last 24 hours. **               ECG/EMG Results (most recent)       None         "     ALLERGIES: Codeine      Current Facility-Administered Medications:     acetaminophen (TYLENOL) tablet 650 mg, 650 mg, Oral, Q6H PRN, Oumar Deutsch MD, 650 mg at 12/25/23 0826    aluminum-magnesium hydroxide-simethicone (MAALOX MAX) 400-400-40 MG/5ML suspension 15 mL, 15 mL, Oral, Q6H PRN, Oumar Deutsch MD    benzonatate (TESSALON) capsule 100 mg, 100 mg, Oral, TID PRN, Oumar Deutsch MD    benztropine (COGENTIN) tablet 2 mg, 2 mg, Oral, Once PRN **OR** benztropine (COGENTIN) injection 1 mg, 1 mg, Intramuscular, Once PRN, Oumar Deutsch MD    buprenorphine-naloxone (SUBOXONE) 8-2 MG per SL tablet 2 tablet, 2 tablet, Sublingual, Daily, Tee Potter MD, 2 tablet at 12/27/23 0801    cefdinir (OMNICEF) capsule 300 mg, 300 mg, Oral, Q12H, Oumar Deutsch MD, 300 mg at 12/27/23 0801    docusate sodium (COLACE) capsule 100 mg, 100 mg, Oral, BID, Tee Potter MD, 100 mg at 12/27/23 0801    famotidine (PEPCID) tablet 20 mg, 20 mg, Oral, BID PRN, Oumar Deutsch MD    FLUoxetine (PROzac) capsule 40 mg, 40 mg, Oral, Daily, Tee Potter MD, 40 mg at 12/27/23 0801    hydrOXYzine (ATARAX) tablet 50 mg, 50 mg, Oral, Q6H PRN, Oumar Deutsch MD, 50 mg at 12/26/23 2156    ibuprofen (ADVIL,MOTRIN) tablet 400 mg, 400 mg, Oral, Q6H PRN, Oumar Deutsch MD, 400 mg at 12/25/23 2124    loperamide (IMODIUM) capsule 2 mg, 2 mg, Oral, Q2H PRN, Oumar Deutsch MD    magnesium hydroxide (MILK OF MAGNESIA) suspension 10 mL, 10 mL, Oral, Daily PRN, Oumar Deutsch MD    nicotine (NICODERM CQ) 21 MG/24HR patch 1 patch, 1 patch, Transdermal, Q24H, Oumar Deutsch MD, 1 patch at 12/27/23 0802    OLANZapine (zyPREXA) tablet 15 mg, 15 mg, Oral, Nightly, Tee Potter MD, 15 mg at 12/26/23 2013    ondansetron (ZOFRAN) tablet 4 mg, 4 mg, Oral, Q6H PRN, Oumar Deutsch MD    prazosin (MINIPRESS) capsule 2 mg, 2 mg, Oral, Nightly, Tee Potter MD, 2 mg at 12/26/23 2013     QUEtiapine (SEROquel) tablet 50 mg, 50 mg, Oral, Nightly, Tee Potter MD, 50 mg at 12/26/23 2013    sodium chloride nasal spray 2 spray, 2 spray, Each Nare, PRN, Oumar Deutsch MD    traZODone (DESYREL) tablet 50 mg, 50 mg, Oral, Nightly PRN, Oumar Deutsch MD, 50 mg at 12/27/23 0153    Reviewed chart, notes, vitals, labs and EKG personally reviewed.    ASSESSMENT & PLAN:    Schizoaffective disorder, bipolar type  -Increase olanzapine to previously successful 20 mg nightly  -Patient reports symptoms improved more rapidly with the addition of quetiapine during last hospitalization.  Increase quetiapine to previously successful 100 mg nightly  -Increase fluoxetine to 40 mg daily  -We will establish outpatient psychiatric care following hospitalization     Post Traumatic Stress Disorder  -Increased prazosin to 2 mg nightly on 12/26/2030  -Fluoxetine as above  -Refer for trauma focused therapy     Opioid use disorder, severe, dependence, on maintenance medication  -Admission UDS positive for buprenorphine.  PDMP data reviewed.  -Continue Suboxone 16 mg daily  -We will refer for substance abuse treatment following hospitalization     Urinary tract infection  -UA with 4+ bacteria, 11-20 WBC, positive nitrite, small leukocyte.  -Culture growing E. coli, susceptible to cephalosporins  -Continue Omnicef  -STI panel negative     Hyponatremia  -Na 135 on admission  -Improved to 139     Elevated WBC  - 13.55 on admission, may be secondary to UTI or stress induced  - Monitor, repeat CBC with mild improvement to 12.37.  No clinical signs of infection at this time    Hepatitis C  -Antibody positive      Special precautions: Special Precautions Level 3 (q15 min checks)     Behavioral Health Treatment Plan and Problem List: I have reviewed and approved the Behavioral Health Treatment Plan and Problem list.  The patient has had a chance to review and agrees with the treatment plan.    I have reviewed the copied text  and it is accurate as of 12/27/23     Clinician:  Tee Potter MD  12/27/23  10:10 EST

## 2023-12-27 NOTE — PLAN OF CARE
Goal Outcome Evaluation:  Plan of Care Reviewed With: patient  Patient Agreement with Plan of Care: agrees     Progress: improving  Outcome Evaluation: Patient coopeative, interacting with staff and peer. Patient reports poor sleep last evening, endorses suicdial ideation, denying any plan or intent, denies hallucination

## 2023-12-28 PROCEDURE — 63710000001 ONDANSETRON PER 8 MG: Performed by: PSYCHIATRY & NEUROLOGY

## 2023-12-28 PROCEDURE — 99232 SBSQ HOSP IP/OBS MODERATE 35: CPT | Performed by: PSYCHIATRY & NEUROLOGY

## 2023-12-28 RX ORDER — QUETIAPINE FUMARATE 100 MG/1
200 TABLET, FILM COATED ORAL NIGHTLY
Status: DISCONTINUED | OUTPATIENT
Start: 2023-12-28 | End: 2023-12-31 | Stop reason: HOSPADM

## 2023-12-28 RX ADMIN — IBUPROFEN 400 MG: 400 TABLET, FILM COATED ORAL at 05:49

## 2023-12-28 RX ADMIN — FLUOXETINE HYDROCHLORIDE 40 MG: 20 CAPSULE ORAL at 08:44

## 2023-12-28 RX ADMIN — CEFDINIR 300 MG: 300 CAPSULE ORAL at 08:44

## 2023-12-28 RX ADMIN — HYDROXYZINE HYDROCHLORIDE 50 MG: 50 TABLET ORAL at 17:37

## 2023-12-28 RX ADMIN — OLANZAPINE 20 MG: 10 TABLET, FILM COATED ORAL at 20:45

## 2023-12-28 RX ADMIN — ONDANSETRON HYDROCHLORIDE 4 MG: 4 TABLET, FILM COATED ORAL at 17:49

## 2023-12-28 RX ADMIN — QUETIAPINE FUMARATE 200 MG: 100 TABLET ORAL at 20:45

## 2023-12-28 RX ADMIN — DOCUSATE SODIUM 100 MG: 100 CAPSULE, LIQUID FILLED ORAL at 08:44

## 2023-12-28 RX ADMIN — NICOTINE TRANSDERMAL SYSTEM 1 PATCH: 21 PATCH, EXTENDED RELEASE TRANSDERMAL at 08:44

## 2023-12-28 RX ADMIN — HYDROXYZINE HYDROCHLORIDE 50 MG: 50 TABLET ORAL at 05:49

## 2023-12-28 RX ADMIN — CEFDINIR 300 MG: 300 CAPSULE ORAL at 20:45

## 2023-12-28 RX ADMIN — BUPRENORPHINE HYDROCHLORIDE AND NALOXONE HYDROCHLORIDE DIHYDRATE 2 TABLET: 8; 2 TABLET SUBLINGUAL at 08:44

## 2023-12-28 RX ADMIN — PRAZOSIN HYDROCHLORIDE 2 MG: 1 CAPSULE ORAL at 20:45

## 2023-12-28 RX ADMIN — DOCUSATE SODIUM 100 MG: 100 CAPSULE, LIQUID FILLED ORAL at 20:45

## 2023-12-28 RX ADMIN — IBUPROFEN 400 MG: 400 TABLET, FILM COATED ORAL at 17:37

## 2023-12-28 NOTE — PROGRESS NOTES
" Inpatient Psych Progress Note     Clinician: Trevor Juarez MD  Admission Date: 12/22/2023  07:41 EST 12/28/23    Behavioral Health Treatment Plan and Problem List: I have reviewed and approved the Behavioral Health Treatment Plan and Problem list.    Allergies  Allergies   Allergen Reactions    Codeine Itching and GI Intolerance       Hospital Day: 6 days      Assessment completed within view of staff    History  CC/clinical focus: psychosis    Interval HPI: Patient seen and evaluated by me.  Chart reviewed. Patient c/o poor sleep. Patient rates  level of depression (subjectively) at a   6/10.  Anxiety   7/10.  Patient tolerating meds okay.  Denies side effects.  Med Compliant.    Review of Systems   Constitutional: Negative.    HENT: Negative.     Eyes: Negative.    Respiratory: Negative.     Cardiovascular: Negative.    Gastrointestinal: Negative.    Endocrine: Negative.    Genitourinary:  Positive for frequency.   Musculoskeletal: Negative.    Skin: Negative.    Allergic/Immunologic: Negative.    Neurological: Negative.    Hematological: Negative.         /93 (BP Location: Right arm, Patient Position: Sitting)   Pulse 75   Temp 96.9 °F (36.1 °C) (Temporal)   Resp 19   Ht 160 cm (63\")   Wt 61.1 kg (134 lb 9.6 oz)   SpO2 95%   BMI 23.84 kg/m²     Mental Status Exam  Mood: anxious  Affect: mood-congruent   Thought Processes: linear  Thought Content: paranoid  Hallucinations: no  Suicidal Thoughts: denies  Suicidal Plan/Intent: denies  Hopelesness:Mild  Homicidal Thoughts:  denies      Medical Decision Making:   Labs:     Lab Results (last 24 hours)       ** No results found for the last 24 hours. **              Radiology:     Imaging Results (Last 24 Hours)       ** No results found for the last 24 hours. **              EKG:     ECG/EMG Results (most recent)       None             Medications:  buprenorphine-naloxone, 2 tablet, Sublingual, Daily  cefdinir, 300 mg, Oral, Q12H  docusate sodium, 100 " mg, Oral, BID  FLUoxetine, 40 mg, Oral, Daily  nicotine, 1 patch, Transdermal, Q24H  OLANZapine, 20 mg, Oral, Nightly  prazosin, 2 mg, Oral, Nightly  QUEtiapine, 100 mg, Oral, Nightly           All medications reviewed.      Assessment and Plan:    Schizoaffective disorder, bipolar type  -Increased olanzapine to previously successful 20 mg nightly on 12/27  -Patient reports symptoms improved more rapidly with the addition of quetiapine during last hospitalization.  Increase quetiapine to previously successful 100 mg nightly, but patient reporting persisting insomnia.  Will increase to 200mg QHS, monitor for EPS.  -Increased fluoxetine to 40 mg daily on 12/27  -We will establish outpatient psychiatric care following hospitalization     Post Traumatic Stress Disorder  -Increased prazosin to 2 mg nightly on 12/26/2030  -Fluoxetine as above  -Refer for trauma focused therapy     Opioid use disorder, severe, dependence, on maintenance medication  -Admission UDS positive for buprenorphine.  PDMP data reviewed.  -Continue Suboxone 16 mg daily  -We will refer for substance abuse treatment following hospitalization     Urinary tract infection  -UA with 4+ bacteria, 11-20 WBC, positive nitrite, small leukocyte.  -Culture growing E. coli, susceptible to cephalosporins  -Continue Omnicef  -STI panel negative     Hyponatremia  -Na 135 on admission  -Improved to 139     Elevated WBC  - 13.55 on admission, may be secondary to UTI or stress induced  - Monitor, repeat CBC with mild improvement to 12.37.  Likely secondary to UTI    Hepatitis C  -Antibody positive         Continue hospitalization for safety and stabilization.  Continue current level of Special Precautions (q15 minute checks).    I, Trevor Juarez MD, I have completed an encounter with the patient today, provided ongoing monitoring and/or adjustments to the assessment and plan described and documented above, and believe this information to be both accurate and complete  as of 12/28/2023

## 2023-12-28 NOTE — PLAN OF CARE
DATA:      Therapist discussed case with RN and met with patient today to review coping skills, review plan of care, and discuss discharge.    Therapist introduced myself as covering for patient's primary therapist, Serina. Patient is agreeable to meet with me.      Clinical Maneuvering/Intervention:     Therapist assisted patient in processing above session content; acknowledged and normalized patient’s thoughts, feelings, and concerns.  Discussed the therapist/patient relationship and explain the parameters and limitations of relative confidentiality.  Also discussed the importance of active participation, and honesty to the treatment process.  Encouraged the patient to discuss/vent their feelings, frustrations, and fears concerning their ongoing medical issues and validated their feelings.     Allowed patient to freely discuss issues without interruption or judgment. Provided safe, confidential environment to facilitate the development of positive therapeutic relationship and encourage open, honest communication.      Therapist addressed discharge safety planning this date. Assisted patient in identifying risk factors which would indicate the need for higher level of care after discharge;  including thoughts to harm self or others and/or self-harming behavior. Encouraged patient to call 911, or present to the nearest emergency room should any of these events occur. Discussed crisis intervention services and means to access.  Encouraged securing any objects of harm.    Therapist completed integrated summary, treatment plan, and initiated social history this date.  Therapist is strongly encouraging family involvement in treatment.       Encouraged mask wearing, social distancing, and regular hand washing due to COVID19 risk.      ASSESSMENT:      Therapist met 1:1 with patient today. Patient denies suicidal ideation. Patient denies homicidal ideation. Patient denies AVH. Patient is calm and cooperative with  session. Patient reports poor sleep overnight and is happy her Seroquel is being increased. Patient continues to be somewhat anxious and paranoid. She reports she is a bit less worried about people trying to harm her. Patient reports she has considered her disposition options and decided she will return to [place]'s home.      PLAN:       Patient to remain hospitalized this date.      Treatment team will focus efforts on stabilizing patient's acute symptoms while providing education on healthy coping and crisis management to reduce hospitalizations.   Patient requires daily psychiatrist evaluation and 24/7 nursing supervision to promote patient  safety.     Therapist will offer 1-4 individual sessions, 1 therapy group daily, family education, and appropriate referral.

## 2023-12-28 NOTE — PLAN OF CARE
Goal Outcome Evaluation:  Plan of Care Reviewed With: patient  Patient Agreement with Plan of Care: agrees     Progress: improving  Outcome Evaluation: Patient has been calm and cooperative this shift. She has spent most of her free time in the day room this evening watching TV and Interacting with peers. She denies SI, HI and AVH. She rates anxiety and depression 7/10. She continues to report difficulty sleeping at night.

## 2023-12-28 NOTE — PLAN OF CARE
Goal Outcome Evaluation:  Plan of Care Reviewed With: patient  Patient Agreement with Plan of Care: agrees        Outcome Evaluation: Patient rates anxiety 6  depression7 . Patient is calm and cooperative with staff and other patient's. Pt denies any SI/HI/AVH. Pt has no complaints on this shift.

## 2023-12-29 PROCEDURE — 99232 SBSQ HOSP IP/OBS MODERATE 35: CPT | Performed by: PSYCHIATRY & NEUROLOGY

## 2023-12-29 RX ORDER — POLYETHYLENE GLYCOL 3350 17 G/17G
17 POWDER, FOR SOLUTION ORAL DAILY
Status: DISCONTINUED | OUTPATIENT
Start: 2023-12-29 | End: 2023-12-31 | Stop reason: HOSPADM

## 2023-12-29 RX ADMIN — HYDROXYZINE HYDROCHLORIDE 50 MG: 50 TABLET ORAL at 20:39

## 2023-12-29 RX ADMIN — TRAZODONE HYDROCHLORIDE 100 MG: 50 TABLET ORAL at 20:39

## 2023-12-29 RX ADMIN — QUETIAPINE FUMARATE 200 MG: 100 TABLET ORAL at 20:39

## 2023-12-29 RX ADMIN — BUPRENORPHINE HYDROCHLORIDE AND NALOXONE HYDROCHLORIDE DIHYDRATE 2 TABLET: 8; 2 TABLET SUBLINGUAL at 08:15

## 2023-12-29 RX ADMIN — HYDROXYZINE HYDROCHLORIDE 50 MG: 50 TABLET ORAL at 10:24

## 2023-12-29 RX ADMIN — PRAZOSIN HYDROCHLORIDE 2 MG: 1 CAPSULE ORAL at 20:39

## 2023-12-29 RX ADMIN — OLANZAPINE 20 MG: 10 TABLET, FILM COATED ORAL at 20:39

## 2023-12-29 RX ADMIN — NICOTINE TRANSDERMAL SYSTEM 1 PATCH: 21 PATCH, EXTENDED RELEASE TRANSDERMAL at 08:15

## 2023-12-29 RX ADMIN — DOCUSATE SODIUM 100 MG: 100 CAPSULE, LIQUID FILLED ORAL at 20:39

## 2023-12-29 RX ADMIN — DOCUSATE SODIUM 100 MG: 100 CAPSULE, LIQUID FILLED ORAL at 08:15

## 2023-12-29 RX ADMIN — FLUOXETINE HYDROCHLORIDE 40 MG: 20 CAPSULE ORAL at 08:15

## 2023-12-29 RX ADMIN — POLYETHYLENE GLYCOL (3350) 17 G: 17 POWDER, FOR SOLUTION ORAL at 11:11

## 2023-12-29 RX ADMIN — IBUPROFEN 400 MG: 400 TABLET, FILM COATED ORAL at 10:23

## 2023-12-29 NOTE — PLAN OF CARE
DATA:      Therapist discussed case with RN and met with patient today to review coping skills, review plan of care, and discuss discharge.    Therapist attempted to contact patient's significant other, Jurgen, to provide him with an update. No answer, left message.      Clinical Maneuvering/Intervention:     Therapist assisted patient in processing above session content; acknowledged and normalized patient’s thoughts, feelings, and concerns.  Discussed the therapist/patient relationship and explain the parameters and limitations of relative confidentiality.  Also discussed the importance of active participation, and honesty to the treatment process.  Encouraged the patient to discuss/vent their feelings, frustrations, and fears concerning their ongoing medical issues and validated their feelings.     Allowed patient to freely discuss issues without interruption or judgment. Provided safe, confidential environment to facilitate the development of positive therapeutic relationship and encourage open, honest communication.      Therapist addressed discharge safety planning this date. Assisted patient in identifying risk factors which would indicate the need for higher level of care after discharge;  including thoughts to harm self or others and/or self-harming behavior. Encouraged patient to call 911, or present to the nearest emergency room should any of these events occur. Discussed crisis intervention services and means to access.  Encouraged securing any objects of harm.    Therapist completed integrated summary, treatment plan, and initiated social history this date.  Therapist is strongly encouraging family involvement in treatment.       Encouraged mask wearing, social distancing, and regular hand washing due to COVID19 risk.      ASSESSMENT:      Therapist met 1:1 with patient today. Patient denies suicidal ideation. Patient denies homicidal ideation. Patient denies AVH. Patient appears distressed today but  "is unable to fully articulate what is bothering her. Patient is tearful. She advised she is feeling depressed and paranoid. Patient's sleep was improved last night with the increased Seroquel. Patient denies any major stressors today, stating she just feels \"down.\" Patient also somewhat restless, pacing around the unit.      PLAN:       Patient to remain hospitalized this date.      Treatment team will focus efforts on stabilizing patient's acute symptoms while providing education on healthy coping and crisis management to reduce hospitalizations.   Patient requires daily psychiatrist evaluation and 24/7 nursing supervision to promote patient  safety.     Therapist will offer 1-4 individual sessions, 1 therapy group daily, family education, and appropriate referral.    "

## 2023-12-29 NOTE — PROGRESS NOTES
" Inpatient Psych Progress Note     Clinician: Trevor Juarez MD  Admission Date: 12/22/2023  10:26 EST 12/29/23    Behavioral Health Treatment Plan and Problem List: I have reviewed and approved the Behavioral Health Treatment Plan and Problem list.    Allergies  Allergies   Allergen Reactions    Codeine Itching and GI Intolerance       Hospital Day: 7 days      Assessment completed within view of staff    History  CC/clinical focus: psychosis    Interval HPI: Patient seen and evaluated by me.  Chart reviewed.  Reports that she feels \"bad depressed\" and \"paranoid\".  Also complains of constipation.  Med Compliant.    Review of Systems   Constitutional: Negative.    HENT: Negative.     Eyes: Negative.    Respiratory: Negative.     Cardiovascular: Negative.    Gastrointestinal:  Positive for constipation.   Endocrine: Negative.    Genitourinary: Negative.    Musculoskeletal: Negative.    Skin: Negative.    Allergic/Immunologic: Negative.    Neurological: Negative.    Hematological: Negative.         /68 (BP Location: Right arm, Patient Position: Sitting)   Pulse 90   Temp 98.1 °F (36.7 °C) (Temporal)   Resp 20   Ht 160 cm (63\")   Wt 61.1 kg (134 lb 9.6 oz)   SpO2 97%   BMI 23.84 kg/m²     Mental Status Exam  Mood: anxious and depressed  Affect: mood-congruent   Thought Processes: linear  Thought Content: negativistic  Hallucinations: no  Suicidal Thoughts: denies  Suicidal Plan/Intent: denies  Hopelesness:Moderate  Homicidal Thoughts:  denies      Medical Decision Making:   Labs:     Lab Results (last 24 hours)       ** No results found for the last 24 hours. **              Radiology:     Imaging Results (Last 24 Hours)       ** No results found for the last 24 hours. **              EKG:     ECG/EMG Results (most recent)       None             Medications:  buprenorphine-naloxone, 2 tablet, Sublingual, Daily  docusate sodium, 100 mg, Oral, BID  FLUoxetine, 40 mg, Oral, Daily  nicotine, 1 patch, " Transdermal, Q24H  OLANZapine, 20 mg, Oral, Nightly  prazosin, 2 mg, Oral, Nightly  QUEtiapine, 200 mg, Oral, Nightly           All medications reviewed.      Assessment and Plan:    Schizoaffective disorder, bipolar type  -Increased olanzapine to previously successful 20 mg nightly on 12/27  -Patient reports symptoms improved more rapidly with the addition of quetiapine during last hospitalization.  Increase quetiapine to previously successful 100 mg nightly, but patient reporting persisting insomnia.  We have increased to 200mg QHS on 12/29, monitor for EPS.  -Increased fluoxetine to 40 mg daily on 12/27  -We will establish outpatient psychiatric care following hospitalization     Post Traumatic Stress Disorder  -Increased prazosin to 2 mg nightly on 12/26/2030  -Fluoxetine as above  -Refer for trauma focused therapy     Opioid use disorder, severe, dependence, on maintenance medication  -Admission UDS positive for buprenorphine.  PDMP data reviewed.  -Continue Suboxone 16 mg daily  -We will refer for substance abuse treatment following hospitalization     Urinary tract infection  -UA with 4+ bacteria, 11-20 WBC, positive nitrite, small leukocyte.  -Culture growing E. coli, susceptible to cephalosporins  -Continue Omnicef  -STI panel negative     Hyponatremia  -Na 135 on admission  -Improved to 139  - Repeat BMP in AM     Elevated WBC  - 13.55 on admission, may be secondary to UTI or stress induced  - Monitor, repeat CBC with mild improvement to 12.37.  Likely secondary to UTI  - Repeat BMP in AM     Hepatitis C  -Antibody positive      Constipation  - Miralax    Continue hospitalization for safety and stabilization.  Continue current level of Special Precautions (q15 minute checks).    I, Trevor Juarez MD, I have completed an encounter with the patient today, provided ongoing monitoring and/or adjustments to the assessment and plan described and documented above, and believe this information to be both accurate  and complete as of 12/29/2023

## 2023-12-29 NOTE — PLAN OF CARE
Goal Outcome Evaluation:  Plan of Care Reviewed With: patient  Patient Agreement with Plan of Care: agrees     Progress: improving          Patient rates anxiety 6 and depression 7-8, denies thoughts of harming self and others, and denies hallucinations.

## 2023-12-29 NOTE — PLAN OF CARE
Goal Outcome Evaluation:  Plan of Care Reviewed With: patient  Patient Agreement with Plan of Care: agrees     Progress: improving  Outcome Evaluation: Patient has been calm and cooperative during this shift. Patient reports being in a better mood today and feels hopeful. Patient rates anxiety 7 and depression 7. She denies SI, HI and AVH. Patient spends most of free time in the day room this evening. She is interactive with peers and staff.

## 2023-12-30 LAB
ANION GAP SERPL CALCULATED.3IONS-SCNC: 7.9 MMOL/L (ref 5–15)
BASOPHILS # BLD AUTO: 0.11 10*3/MM3 (ref 0–0.2)
BASOPHILS NFR BLD AUTO: 1.4 % (ref 0–1.5)
BUN SERPL-MCNC: 32 MG/DL (ref 6–20)
BUN/CREAT SERPL: 28.1 (ref 7–25)
CALCIUM SPEC-SCNC: 9.3 MG/DL (ref 8.6–10.5)
CHLORIDE SERPL-SCNC: 105 MMOL/L (ref 98–107)
CO2 SERPL-SCNC: 24.1 MMOL/L (ref 22–29)
CREAT SERPL-MCNC: 1.14 MG/DL (ref 0.57–1)
DEPRECATED RDW RBC AUTO: 44.5 FL (ref 37–54)
EGFRCR SERPLBLD CKD-EPI 2021: 58 ML/MIN/1.73
EOSINOPHIL # BLD AUTO: 0.51 10*3/MM3 (ref 0–0.4)
EOSINOPHIL NFR BLD AUTO: 6.5 % (ref 0.3–6.2)
ERYTHROCYTE [DISTWIDTH] IN BLOOD BY AUTOMATED COUNT: 13 % (ref 12.3–15.4)
GLUCOSE SERPL-MCNC: 97 MG/DL (ref 65–99)
HCT VFR BLD AUTO: 37.1 % (ref 34–46.6)
HGB BLD-MCNC: 12 G/DL (ref 12–15.9)
IMM GRANULOCYTES # BLD AUTO: 0.01 10*3/MM3 (ref 0–0.05)
IMM GRANULOCYTES NFR BLD AUTO: 0.1 % (ref 0–0.5)
LYMPHOCYTES # BLD AUTO: 2.57 10*3/MM3 (ref 0.7–3.1)
LYMPHOCYTES NFR BLD AUTO: 33 % (ref 19.6–45.3)
MCH RBC QN AUTO: 30.2 PG (ref 26.6–33)
MCHC RBC AUTO-ENTMCNC: 32.3 G/DL (ref 31.5–35.7)
MCV RBC AUTO: 93.2 FL (ref 79–97)
MONOCYTES # BLD AUTO: 1.21 10*3/MM3 (ref 0.1–0.9)
MONOCYTES NFR BLD AUTO: 15.5 % (ref 5–12)
NEUTROPHILS NFR BLD AUTO: 3.38 10*3/MM3 (ref 1.7–7)
NEUTROPHILS NFR BLD AUTO: 43.5 % (ref 42.7–76)
NRBC BLD AUTO-RTO: 0 /100 WBC (ref 0–0.2)
PLATELET # BLD AUTO: 224 10*3/MM3 (ref 140–450)
PMV BLD AUTO: 10.8 FL (ref 6–12)
POTASSIUM SERPL-SCNC: 5.1 MMOL/L (ref 3.5–5.2)
RBC # BLD AUTO: 3.98 10*6/MM3 (ref 3.77–5.28)
SODIUM SERPL-SCNC: 137 MMOL/L (ref 136–145)
WBC NRBC COR # BLD AUTO: 7.79 10*3/MM3 (ref 3.4–10.8)

## 2023-12-30 PROCEDURE — 80048 BASIC METABOLIC PNL TOTAL CA: CPT | Performed by: PSYCHIATRY & NEUROLOGY

## 2023-12-30 PROCEDURE — 99232 SBSQ HOSP IP/OBS MODERATE 35: CPT | Performed by: PSYCHIATRY & NEUROLOGY

## 2023-12-30 PROCEDURE — 85025 COMPLETE CBC W/AUTO DIFF WBC: CPT | Performed by: PSYCHIATRY & NEUROLOGY

## 2023-12-30 PROCEDURE — 63710000001 ONDANSETRON PER 8 MG: Performed by: PSYCHIATRY & NEUROLOGY

## 2023-12-30 RX ADMIN — TRAZODONE HYDROCHLORIDE 100 MG: 50 TABLET ORAL at 21:02

## 2023-12-30 RX ADMIN — DOCUSATE SODIUM 100 MG: 100 CAPSULE, LIQUID FILLED ORAL at 08:05

## 2023-12-30 RX ADMIN — QUETIAPINE FUMARATE 200 MG: 100 TABLET ORAL at 21:02

## 2023-12-30 RX ADMIN — HYDROXYZINE HYDROCHLORIDE 50 MG: 50 TABLET ORAL at 22:02

## 2023-12-30 RX ADMIN — FLUOXETINE HYDROCHLORIDE 40 MG: 20 CAPSULE ORAL at 08:05

## 2023-12-30 RX ADMIN — IBUPROFEN 400 MG: 400 TABLET, FILM COATED ORAL at 22:02

## 2023-12-30 RX ADMIN — IBUPROFEN 400 MG: 400 TABLET, FILM COATED ORAL at 14:53

## 2023-12-30 RX ADMIN — NICOTINE TRANSDERMAL SYSTEM 1 PATCH: 21 PATCH, EXTENDED RELEASE TRANSDERMAL at 08:07

## 2023-12-30 RX ADMIN — PRAZOSIN HYDROCHLORIDE 2 MG: 1 CAPSULE ORAL at 21:02

## 2023-12-30 RX ADMIN — OLANZAPINE 20 MG: 10 TABLET, FILM COATED ORAL at 21:02

## 2023-12-30 RX ADMIN — POLYETHYLENE GLYCOL (3350) 17 G: 17 POWDER, FOR SOLUTION ORAL at 08:05

## 2023-12-30 RX ADMIN — BUPRENORPHINE HYDROCHLORIDE AND NALOXONE HYDROCHLORIDE DIHYDRATE 2 TABLET: 8; 2 TABLET SUBLINGUAL at 08:05

## 2023-12-30 RX ADMIN — DOCUSATE SODIUM 100 MG: 100 CAPSULE, LIQUID FILLED ORAL at 21:02

## 2023-12-30 RX ADMIN — HYDROXYZINE HYDROCHLORIDE 50 MG: 50 TABLET ORAL at 14:53

## 2023-12-30 RX ADMIN — ONDANSETRON HYDROCHLORIDE 4 MG: 4 TABLET, FILM COATED ORAL at 21:17

## 2023-12-30 NOTE — NURSING NOTE
Pt had gotten up a few times throughout the night, pt had appeared to be resting in bed asleep for rest of the night. Pt hadn't voice any concerns or issues.

## 2023-12-30 NOTE — PROGRESS NOTES
"      Inpatient Psych Progress Note     Clinician: Trevor Juarez MD  Admission Date: 12/22/2023  07:40 EST 12/30/23    Behavioral Health Treatment Plan and Problem List: I have reviewed and approved the Behavioral Health Treatment Plan and Problem list.    Allergies  Allergies   Allergen Reactions    Codeine Itching and GI Intolerance       Hospital Day: 8 days      Assessment completed within view of staff    History  CC/clinical focus: psychosis     Interval HPI: Patient seen and evaluated by me.  Chart reviewed.  Patient woke up last night and was unable to go back to sleep. Otherwise, reports doing well this morning. Subjectively rates anxiety 6/10 and depression 6/10. Denies any SI/HI. Tolerating medications well and without side effect.   Med Compliant.  ROS otherwise as below.      Interval Review of Systems:   General ROS: negative for - fever or malaise  Endocrine ROS: negative for - palpitations  Respiratory ROS: no cough, shortness of breath, or wheezing  Cardiovascular ROS: no chest pain or dyspnea on exertion  Gastrointestinal ROS: no abdominal pain,no black or bloody stools    /61 (BP Location: Right arm, Patient Position: Sitting)   Pulse 88   Temp 97.2 °F (36.2 °C) (Temporal)   Resp 16   Ht 160 cm (63\")   Wt 61.1 kg (134 lb 9.6 oz)   SpO2 95%   BMI 23.84 kg/m²     Mental Status Exam  Mood: anxious and depressed  Affect: mood-congruent   Thought Processes:  linear  Thought Content: negativistic  Hallucinations: no  Suicidal Thoughts: denies  Suicidal Plan/Intent: denies  Hopelesness:Moderate  Homicidal Thoughts:  denies      Medical Decision Making:   Labs:     Lab Results (last 24 hours)       Procedure Component Value Units Date/Time    Basic Metabolic Panel [[id number]]  (Abnormal) Collected: 12/30/23 0521    Specimen: Blood Updated: 12/30/23 0618     Glucose 97 mg/dL      BUN 32 mg/dL      Creatinine 1.14 mg/dL      Sodium 137 mmol/L      Potassium 5.1 mmol/L      Comment: Slight " hemolysis detected by analyzer. Result may be falsely elevated.        Chloride 105 mmol/L      CO2 24.1 mmol/L      Calcium 9.3 mg/dL      BUN/Creatinine Ratio 28.1     Anion Gap 7.9 mmol/L      eGFR 58.0 mL/min/1.73     Narrative:      GFR Normal >60  Chronic Kidney Disease <60  Kidney Failure <15      CBC & Differential [[id number]]  (Abnormal) Collected: 12/30/23 0521    Specimen: Blood Updated: 12/30/23 0555    Narrative:      The following orders were created for panel order CBC & Differential.  Procedure                               Abnormality         Status                     ---------                               -----------         ------                     CBC Auto Differential[[id number]]        Abnormal            Final result                 Please view results for these tests on the individual orders.    CBC Auto Differential [[id number]]  (Abnormal) Collected: 12/30/23 0521    Specimen: Blood Updated: 12/30/23 0555     WBC 7.79 10*3/mm3      RBC 3.98 10*6/mm3      Hemoglobin 12.0 g/dL      Hematocrit 37.1 %      MCV 93.2 fL      MCH 30.2 pg      MCHC 32.3 g/dL      RDW 13.0 %      RDW-SD 44.5 fl      MPV 10.8 fL      Platelets 224 10*3/mm3      Neutrophil % 43.5 %      Lymphocyte % 33.0 %      Monocyte % 15.5 %      Eosinophil % 6.5 %      Basophil % 1.4 %      Immature Grans % 0.1 %      Neutrophils, Absolute 3.38 10*3/mm3      Lymphocytes, Absolute 2.57 10*3/mm3      Monocytes, Absolute 1.21 10*3/mm3      Eosinophils, Absolute 0.51 10*3/mm3      Basophils, Absolute 0.11 10*3/mm3      Immature Grans, Absolute 0.01 10*3/mm3      nRBC 0.0 /100 WBC               Radiology:     Imaging Results (Last 24 Hours)       ** No results found for the last 24 hours. **              EKG:     ECG/EMG Results (most recent)       None             Medications:  buprenorphine-naloxone, 2 tablet, Sublingual, Daily  docusate sodium, 100 mg, Oral, BID  FLUoxetine, 40 mg, Oral, Daily  nicotine, 1 patch, Transdermal,  Q24H  OLANZapine, 20 mg, Oral, Nightly  polyethylene glycol, 17 g, Oral, Daily  prazosin, 2 mg, Oral, Nightly  QUEtiapine, 200 mg, Oral, Nightly           All medications reviewed.      Assessment and Plan:      Schizoaffective disorder, bipolar type  - Increased olanzapine to previously successful 20 mg nightly on 12/27  - Quetiapine increased to 200mg QHS on 12/29, monitor for EPS  - Increased fluoxetine to 40 mg daily on 12/27  - We will establish outpatient psychiatric care following hospitalization     Post Traumatic Stress Disorder  - Increased prazosin to 2 mg nightly on 12/26/2030  - Fluoxetine as above  - Refer for trauma focused therapy     Opioid use disorder, severe, dependence, on maintenance medication  - Admission UDS positive for buprenorphine.  PDMP data reviewed.  - Continue Suboxone 16 mg daily  - We will refer for substance abuse treatment following hospitalization     Urinary tract infection  - UA with 4+ bacteria, 11-20 WBC, positive nitrite, small leukocyte.  - Culture growing E. coli, susceptible to cephalosporins  - Continue Omnicef  - STI panel negative     Hyponatremia  - Na 135 on admission  - Improved to 139 on 12/23  - Repeat remaining stable at 137 on 12/30     Elevated WBC  - 13.55 on admission, may be secondary to UTI or stress induced  - Repeat CBC on 12/23 with mild improvement to 12.37  - Repeat CBC on 12/30 improved with WBC of 7.79     Hepatitis C  - Antibody positive  - Will need follow up outpatient      Constipation  - Miralax      Continue hospitalization for safety and stabilization.  Continue current level of Special Precautions (q15 minute checks).        This note was generated by a scribe, Valeriano Arora. The work documented in this note was completed, reviewed, and approved by the attending psychiatrist as designated Dr. Trevor Juarez electronic signature.     I, Trevor Juarez MD, personally performed the services described in this documentation as scribed by the above  named individual and is both accurate and complete as of 12/30/2023.

## 2023-12-30 NOTE — PLAN OF CARE
Goal Outcome Evaluation:  Plan of Care Reviewed With: patient  Patient Agreement with Plan of Care: agrees         Pt reported not sleeping well at night. Pt rated anxiety 6/10 and depression 6/10. Pt denies SI/HI/AVH. Pt doesn't voice any other concerns at this time. No acute s/s of distress noted.

## 2023-12-30 NOTE — PLAN OF CARE
Problem: Adult Behavioral Health Plan of Care  Goal: Plan of Care Review  Outcome: Ongoing, Progressing  Flowsheets  Taken 12/30/2023 1237 by Stanton Nicholson, RN  Outcome Evaluation: PATIENT VERBALIZES MODERATE ANXIETY AND DEPRESSION AS ONLY PROBLEMS THIS SHIFT. PATIENT IS AOX3, COOPERATIVE WITH NO S/S OF ACUTE DISTRESS NOTED.  NNO NOTED.  Taken 12/30/2023 0745 by Pranav Perdue, RN  Plan of Care Reviewed With: patient  Patient Agreement with Plan of Care: agrees  Taken 12/29/2023 1628 by Caitlin Keys, RN  Progress: improving   Goal Outcome Evaluation:              Outcome Evaluation: PATIENT VERBALIZES MODERATE ANXIETY AND DEPRESSION AS ONLY PROBLEMS THIS SHIFT. PATIENT IS AOX3, COOPERATIVE WITH NO S/S OF ACUTE DISTRESS NOTED.  NNO NOTED.

## 2023-12-31 VITALS
BODY MASS INDEX: 23.85 KG/M2 | HEART RATE: 67 BPM | SYSTOLIC BLOOD PRESSURE: 185 MMHG | TEMPERATURE: 96.8 F | RESPIRATION RATE: 18 BRPM | DIASTOLIC BLOOD PRESSURE: 95 MMHG | OXYGEN SATURATION: 96 % | HEIGHT: 63 IN | WEIGHT: 134.6 LBS

## 2023-12-31 PROCEDURE — 99239 HOSP IP/OBS DSCHRG MGMT >30: CPT | Performed by: PSYCHIATRY & NEUROLOGY

## 2023-12-31 RX ORDER — OLANZAPINE 20 MG/1
20 TABLET ORAL NIGHTLY
Qty: 30 TABLET | Refills: 0 | Status: SHIPPED | OUTPATIENT
Start: 2023-12-31

## 2023-12-31 RX ORDER — PRAZOSIN HYDROCHLORIDE 2 MG/1
2 CAPSULE ORAL NIGHTLY
Qty: 30 CAPSULE | Refills: 0 | Status: SHIPPED | OUTPATIENT
Start: 2023-12-31

## 2023-12-31 RX ORDER — TRAZODONE HYDROCHLORIDE 100 MG/1
100 TABLET ORAL NIGHTLY PRN
Qty: 30 TABLET | Refills: 0 | Status: SHIPPED | OUTPATIENT
Start: 2023-12-31

## 2023-12-31 RX ORDER — QUETIAPINE FUMARATE 200 MG/1
200 TABLET, FILM COATED ORAL NIGHTLY
Qty: 30 TABLET | Refills: 0 | Status: SHIPPED | OUTPATIENT
Start: 2023-12-31

## 2023-12-31 RX ORDER — FLUOXETINE HYDROCHLORIDE 40 MG/1
40 CAPSULE ORAL DAILY
Qty: 30 CAPSULE | Refills: 0 | Status: SHIPPED | OUTPATIENT
Start: 2024-01-01

## 2023-12-31 RX ADMIN — DOCUSATE SODIUM 100 MG: 100 CAPSULE, LIQUID FILLED ORAL at 08:26

## 2023-12-31 RX ADMIN — BUPRENORPHINE HYDROCHLORIDE AND NALOXONE HYDROCHLORIDE DIHYDRATE 2 TABLET: 8; 2 TABLET SUBLINGUAL at 08:26

## 2023-12-31 RX ADMIN — FLUOXETINE HYDROCHLORIDE 40 MG: 20 CAPSULE ORAL at 08:27

## 2023-12-31 RX ADMIN — NICOTINE TRANSDERMAL SYSTEM 1 PATCH: 21 PATCH, EXTENDED RELEASE TRANSDERMAL at 08:26

## 2023-12-31 RX ADMIN — HYDROXYZINE HYDROCHLORIDE 50 MG: 50 TABLET ORAL at 08:30

## 2023-12-31 RX ADMIN — POLYETHYLENE GLYCOL (3350) 17 G: 17 POWDER, FOR SOLUTION ORAL at 08:27

## 2023-12-31 NOTE — DISCHARGE SUMMARY
Date of Discharge:  12/31/2023    Discharge Diagnosis:  Schizoaffective disorder, bipolar type   Post Traumatic Stress Disorder   Opioid use disorder, severe, dependence, on maintenance medication   Urinary tract infection   Hepatitis C       Hospital Course:  Patient was admitted for safety and stabilization.   Routine labs were checked.  Patient was assigned a master's level therapist and provided with an opportunity to participate in group and individual therapy and counseling on the unit.  Patient was started on medications that have been previously helpful during a previous psychiatric hospitalization.  We restarted Zyprexa and titrated to 20 mg at bedtime.  We also restarted Seroquel and titrated up to 100 mg at bedtime.  We restarted prazosin as well as Prozac and titrated the Prozac to 40 mg daily.  She was continued on Suboxone MAT.  She was treated for urinary tract infection with Omnicef.  Prazosin was increased to 2 mg at bedtime.  Patient experienced improvement in psychosis over the course of hospitalization.  Her mood improved.  She was monitored for stability and eventually discharged in stable condition with a plan to follow-up with previous behavioral health provider.      Consults:   Consults       No orders found from 11/23/2023 to 12/23/2023.            Labs:  Lab Results (all)       Procedure Component Value Units Date/Time    Basic Metabolic Panel [[id number]]  (Abnormal) Collected: 12/30/23 0521    Specimen: Blood Updated: 12/30/23 0618     Glucose 97 mg/dL      BUN 32 mg/dL      Creatinine 1.14 mg/dL      Sodium 137 mmol/L      Potassium 5.1 mmol/L      Comment: Slight hemolysis detected by analyzer. Result may be falsely elevated.        Chloride 105 mmol/L      CO2 24.1 mmol/L      Calcium 9.3 mg/dL      BUN/Creatinine Ratio 28.1     Anion Gap 7.9 mmol/L      eGFR 58.0 mL/min/1.73     Narrative:      GFR Normal >60  Chronic Kidney Disease <60  Kidney Failure <15      CBC & Differential  [[id number]]  (Abnormal) Collected: 12/30/23 0521    Specimen: Blood Updated: 12/30/23 0555    Narrative:      The following orders were created for panel order CBC & Differential.  Procedure                               Abnormality         Status                     ---------                               -----------         ------                     CBC Auto Differential[[id number]]        Abnormal            Final result                 Please view results for these tests on the individual orders.    CBC Auto Differential [[id number]]  (Abnormal) Collected: 12/30/23 0521    Specimen: Blood Updated: 12/30/23 0555     WBC 7.79 10*3/mm3      RBC 3.98 10*6/mm3      Hemoglobin 12.0 g/dL      Hematocrit 37.1 %      MCV 93.2 fL      MCH 30.2 pg      MCHC 32.3 g/dL      RDW 13.0 %      RDW-SD 44.5 fl      MPV 10.8 fL      Platelets 224 10*3/mm3      Neutrophil % 43.5 %      Lymphocyte % 33.0 %      Monocyte % 15.5 %      Eosinophil % 6.5 %      Basophil % 1.4 %      Immature Grans % 0.1 %      Neutrophils, Absolute 3.38 10*3/mm3      Lymphocytes, Absolute 2.57 10*3/mm3      Monocytes, Absolute 1.21 10*3/mm3      Eosinophils, Absolute 0.51 10*3/mm3      Basophils, Absolute 0.11 10*3/mm3      Immature Grans, Absolute 0.01 10*3/mm3      nRBC 0.0 /100 WBC     Chlamydia trachomatis, Neisseria gonorrhoeae, Trichomonas vaginalis, PCR - Urine, Urine, Clean Catch [[id number]]  (Normal) Collected: 12/23/23 1248    Specimen: Urine, Clean Catch Updated: 12/23/23 1503     Chlamydia DNA by PCR Not Detected     Neisseria gonorrhoeae by PCR Not Detected     Trichomonas vaginalis PCR Not Detected    Hepatitis Panel, Acute [[id number]]  (Abnormal) Collected: 12/23/23 0848    Specimen: Blood Updated: 12/23/23 1048     Hepatitis B Surface Ag Non-Reactive     Hep A IgM Non-Reactive     Hep B C IgM Non-Reactive     Hepatitis C Ab Reactive    Narrative:      Results may be falsely decreased if patient taking Biotin.     Basic Metabolic Panel  [[id number]]  (Abnormal) Collected: 12/23/23 0848    Specimen: Blood Updated: 12/23/23 0941     Glucose 103 mg/dL      BUN 10 mg/dL      Creatinine 1.15 mg/dL      Sodium 139 mmol/L      Potassium 4.1 mmol/L      Chloride 102 mmol/L      CO2 25.7 mmol/L      Calcium 9.9 mg/dL      BUN/Creatinine Ratio 8.7     Anion Gap 11.3 mmol/L      eGFR 57.4 mL/min/1.73     Narrative:      GFR Normal >60  Chronic Kidney Disease <60  Kidney Failure <15      CBC & Differential [[id number]]  (Abnormal) Collected: 12/23/23 0848    Specimen: Blood Updated: 12/23/23 0913    Narrative:      The following orders were created for panel order CBC & Differential.  Procedure                               Abnormality         Status                     ---------                               -----------         ------                     CBC Auto Differential[[id number]]        Abnormal            Final result                 Please view results for these tests on the individual orders.    CBC Auto Differential [[id number]]  (Abnormal) Collected: 12/23/23 0848    Specimen: Blood Updated: 12/23/23 0913     WBC 12.37 10*3/mm3      RBC 4.87 10*6/mm3      Hemoglobin 14.7 g/dL      Hematocrit 45.0 %      MCV 92.4 fL      MCH 30.2 pg      MCHC 32.7 g/dL      RDW 13.4 %      RDW-SD 45.4 fl      MPV 10.1 fL      Platelets 313 10*3/mm3      Neutrophil % 60.9 %      Lymphocyte % 25.1 %      Monocyte % 11.1 %      Eosinophil % 1.6 %      Basophil % 0.9 %      Immature Grans % 0.4 %      Neutrophils, Absolute 7.53 10*3/mm3      Lymphocytes, Absolute 3.11 10*3/mm3      Monocytes, Absolute 1.37 10*3/mm3      Eosinophils, Absolute 0.20 10*3/mm3      Basophils, Absolute 0.11 10*3/mm3      Immature Grans, Absolute 0.05 10*3/mm3      nRBC 0.0 /100 WBC             Imaging:  Imaging Results (All)       None              Condition on Discharge:  Stable    Vital Signs  Temp:  [96.5 °F (35.8 °C)-96.8 °F (36 °C)] 96.8 °F (36 °C)  Heart Rate:  [67-75] 67  Resp:  [18]  18  BP: (146-185)/(62-95) 185/95  BMI Readings from Last 1 Encounters:   12/22/23 23.84 kg/m²       Discharge Disposition  Home or Self Care    Discharge Medications     Discharge Medications        New Medications        Instructions Start Date   FLUoxetine 40 MG capsule  Commonly known as: PROzac   40 mg, Oral, Daily   Start Date: January 1, 2024     OLANZapine 20 MG tablet  Commonly known as: zyPREXA   20 mg, Oral, Nightly      prazosin 2 MG capsule  Commonly known as: MINIPRESS   2 mg, Oral, Nightly      QUEtiapine 200 MG tablet  Commonly known as: SEROquel   200 mg, Oral, Nightly             Continue These Medications        Instructions Start Date   buprenorphine-naloxone 8-2 MG per SL tablet  Commonly known as: SUBOXONE   2 tablets, Sublingual, Daily               Discharge Diet: Regular    Activity at Discharge: As tolerated    Follow-up Appointments:      [facility], [place] [address] [place] [id number] [phone]     Thursday, January 4, 2023 at 10:00 AM with Chelsea.          Time: I spent  34 minutes on this discharge activity which included:  encounter with the patient, reviewing the data in the system, coordination of the care with the nursing staff as well as consultants, documentation, and entering orders.      Trevor Juarez MD  12/31/23  12:16 EST

## 2023-12-31 NOTE — PROGRESS NOTES
"      Inpatient Psych Progress Note     Clinician: Trevor Juarez MD  Admission Date: 12/22/2023  07:42 EST 12/31/23    Behavioral Health Treatment Plan and Problem List: I have reviewed and approved the Behavioral Health Treatment Plan and Problem list.    Allergies  Allergies   Allergen Reactions    Codeine Itching and GI Intolerance       Hospital Day: 9 days      Assessment completed within view of staff    History  CC/clinical focus: psychosis     Interval HPI: Patient seen and evaluated by me.  Chart reviewed.  Patient doing well this morning. Requesting discharge today. Reports feeling more hopeful and is future-oriented. She identifies family as a reason to continue living. Denies any SI/HI/AVH. Doing well with her medications.   Med Compliant.  ROS otherwise as below.    Review of Systems   Constitutional: Negative.    HENT: Negative.     Eyes: Negative.    Respiratory: Negative.     Cardiovascular: Negative.    Gastrointestinal: Negative.    Endocrine: Negative.    Genitourinary: Negative.    Musculoskeletal: Negative.    Skin: Negative.    Allergic/Immunologic: Negative.    Neurological: Negative.    Hematological: Negative.         BP (!) 185/95 (BP Location: Right arm, Patient Position: Sitting) Comment: reported to RN Pranav  Pulse 67   Temp 96.8 °F (36 °C) (Temporal)   Resp 18   Ht 160 cm (63\")   Wt 61.1 kg (134 lb 9.6 oz)   SpO2 96%   BMI 23.84 kg/m²     Mental Status Exam  Mood: normal  Affect: mood-congruent   Thought Processes:  linear  Thought Content: normal  Hallucinations: no  Suicidal Thoughts: denies  Suicidal Plan/Intent: denies  Hopelesness:no  Homicidal Thoughts:  denies      Medical Decision Making:   Labs:     Lab Results (last 24 hours)       ** No results found for the last 24 hours. **              Radiology:     Imaging Results (Last 24 Hours)       ** No results found for the last 24 hours. **              EKG:     ECG/EMG Results (most recent)       None         "     Medications:  buprenorphine-naloxone, 2 tablet, Sublingual, Daily  docusate sodium, 100 mg, Oral, BID  FLUoxetine, 40 mg, Oral, Daily  nicotine, 1 patch, Transdermal, Q24H  OLANZapine, 20 mg, Oral, Nightly  polyethylene glycol, 17 g, Oral, Daily  prazosin, 2 mg, Oral, Nightly  QUEtiapine, 200 mg, Oral, Nightly           All medications reviewed.      Assessment and Plan:     Schizoaffective disorder, bipolar type  - Increased olanzapine to previously successful 20 mg nightly on 12/27  - Quetiapine increased to 200mg QHS on 12/29, monitor for EPS  - Increased fluoxetine to 40 mg daily on 12/27  - Patient is requesting to leave and is no longer meeting criteria for inpatient hospitalization.  Will discharged today.     Post Traumatic Stress Disorder  - Increased prazosin to 2 mg nightly on 12/26/2030  - Fluoxetine as above       Opioid use disorder, severe, dependence, on maintenance medication  - Admission UDS positive for buprenorphine.  PDMP data reviewed.  - Continue Suboxone 16 mg daily       Urinary tract infection  - UA with 4+ bacteria, 11-20 WBC, positive nitrite, small leukocyte.  - Culture growing E. coli, susceptible to cephalosporins  - Continue Omnicef  - STI panel negative     Hyponatremia  - Na 135 on admission  - Improved to 139 on 12/23  - Repeat remaining stable at 137 on 12/30     Elevated WBC  - 13.55 on admission, may be secondary to UTI or stress induced  - Repeat CBC on 12/23 with mild improvement to 12.37  - Repeat CBC on 12/30 improved with WBC of 7.79     Hepatitis C  - Antibody positive  - Will need follow up outpatient      Constipation  - Miralax         Continue hospitalization for safety and stabilization.  Continue current level of Special Precautions (q15 minute checks).        This note was generated by a Valeriano bartlett. The work documented in this note was completed, reviewed, and approved by the attending psychiatrist as designated Dr. Trevor Juarez electronic signature.      I, Trevor Juarez MD, personally performed the services described in this documentation as scribed by the above named individual and is both accurate and complete as of 12/31/2023.

## 2023-12-31 NOTE — PLAN OF CARE
Goal Outcome Evaluation:  Plan of Care Reviewed With: patient  Patient Agreement with Plan of Care: agrees        Outcome Evaluation: Pt reports good appetite and poor sleep. Anxiety 5/10 and depression 7/10. Denies SI/HI and AVH.

## 2023-12-31 NOTE — PLAN OF CARE
Goal Outcome Evaluation:  Plan of Care Reviewed With: patient  Patient Agreement with Plan of Care: agrees        Outcome Evaluation: Patient discharge this shift. Reviewed discharge, follow up, medication. verbalized understanding.

## 2023-12-31 NOTE — NURSING NOTE
Upon review of AVS, discharge, follow up, medication  pt verbalized understanding, Pt had concern for no RX for  prn trazodone 50 mg taken nightly to aide in sleep.  Spoke with Dr. JAGJIT Juarez at this time regarding patient concern. Advised will add trazodone 50 mg prn hs,will send RX to [facility] in [facility].    Patient updated on plan. Verbalized understanding

## 2024-01-01 NOTE — PROGRESS NOTES
Behavioral Health Discharge Summary             Please fax within 24 hours of discharge to [facility] at: [phone]      Member Name: Anna Moore Member ID: [id number]   Authorization Number: [id number] Phone: [phone]   Member Address: [address] [place] [address] [place] [address]   Discharge Date: 12/31/2023 Level of Care at Discharge:    Facility: [facility] Staff Completing Form: Amy VALLES   If the member is being discharged directly to a residential or extended care program, please specify the type below.   __Private Child-Caring Facility (PCC) Residential/Group Home   __Private Child-Caring Facility (PCC) Therapeutic Foster Care   __Residential Treatment Facility (RTF)   __Psychiatric Residential Treatment Facility (PRTF I or II)   __Long-Term Acute Inpatient Hospital Services or Extended Care Unit (ECU)   __Other (please specify):    Brief discharge summary of treatment received (for follow up by the case management team): D/C clinical with list of medications and follow up appts given to patient upon discharge.     BRIEF SUMMARY OF RECOMMENDATIONS FOR ONGOING TREATMENT     Discharged to where: HOME   Discharge diagnoses: F 30.10   Axis I:    Axis II:    Axis III:    Axis IV:    Axis V:    Does the member understand his/her DX?  Yes          Medication     Dose     Schedule Supply/  Quantity  Given at Discharge RX Provided  Yes/No  If Rx Provided, Quantity RX Prior Auth Required  Yes/No Prior Auth  Completed   Prozac  40 mg  1 tab PO Daily         Zyprexa  20 mg  1 tab PO HS         Prazosin  2 mg  1 tab PO HS         Seroquel  200 mg  1 cap PO HS         Trazodone 100 mg  1 tab PO HS                                                 Does the member understand the reason for taking these medications? Yes                                                           FOLLOW-UP APPOINTMENTS   Please schedule within 7 days of discharge and provide appointment details for all  referred services.    PCP/Other Providers Involved in Treatment:    Appointment Type: OP  [facility]     Provider Phone:   [phone] Appointment Date: 01/04/24 Appointment Time:   10:00 AM with Chelsea Recio   (new to OP services)        Case Management    Is the member already enrolled in case management?  Yes/No  If yes, date the CM was notified:    If no, was the CM referral offered?  Yes/No  Accepted? Yes/No    Is the Release of Information in the chart? Yes/No:      Medication Management (for member discharged with psychiatric medications):      A&D Treatment (for member with substance abuse/   dependence in the past year):      Medical Condition (for member with a medical condition):    Other recommended treatment:    Do you have any concerns about the discharge plan?  No    If yes, explain:    Was the member involved in the discharge planning?  Yes    If no, explain:    Was a copy of the discharge plan provided to the member?  Yes    If no, explain:

## 2024-03-05 ENCOUNTER — TRANSCRIBE ORDERS (OUTPATIENT)
Dept: ADMINISTRATIVE | Facility: HOSPITAL | Age: 54
End: 2024-03-05
Payer: COMMERCIAL

## 2024-04-26 ENCOUNTER — HOSPITAL ENCOUNTER (EMERGENCY)
Facility: HOSPITAL | Age: 54
Discharge: HOME | End: 2024-04-26
Attending: STUDENT IN AN ORGANIZED HEALTH CARE EDUCATION/TRAINING PROGRAM
Payer: COMMERCIAL

## 2024-04-26 VITALS
DIASTOLIC BLOOD PRESSURE: 82 MMHG | WEIGHT: 150 LBS | HEIGHT: 63 IN | OXYGEN SATURATION: 100 % | BODY MASS INDEX: 26.58 KG/M2 | SYSTOLIC BLOOD PRESSURE: 121 MMHG | RESPIRATION RATE: 18 BRPM | TEMPERATURE: 97.6 F | HEART RATE: 110 BPM

## 2024-04-26 DIAGNOSIS — N39.0 ACUTE UTI: ICD-10-CM

## 2024-04-26 DIAGNOSIS — F29 PSYCHOSIS, UNSPECIFIED PSYCHOSIS TYPE: Primary | ICD-10-CM

## 2024-04-26 LAB
ALBUMIN SERPL-MCNC: 4.1 G/DL (ref 3.5–5.2)
ALBUMIN/GLOB SERPL: 1 G/DL
ALP SERPL-CCNC: 86 U/L (ref 39–117)
ALT SERPL W P-5'-P-CCNC: 13 U/L (ref 1–33)
AMPHET+METHAMPHET UR QL: NEGATIVE
AMPHETAMINES UR QL: NEGATIVE
ANION GAP SERPL CALCULATED.3IONS-SCNC: 13.8 MMOL/L (ref 5–15)
AST SERPL-CCNC: 17 U/L (ref 1–32)
B-HCG UR QL: NEGATIVE
BACTERIA UR QL AUTO: ABNORMAL /HPF
BARBITURATES UR QL SCN: NEGATIVE
BASOPHILS # BLD AUTO: 0.11 10*3/MM3 (ref 0–0.2)
BASOPHILS NFR BLD AUTO: 1 % (ref 0–1.5)
BENZODIAZ UR QL SCN: NEGATIVE
BILIRUB SERPL-MCNC: 0.3 MG/DL (ref 0–1.2)
BILIRUB UR QL STRIP: NEGATIVE
BUN SERPL-MCNC: 4 MG/DL (ref 6–20)
BUN/CREAT SERPL: 4.9 (ref 7–25)
BUPRENORPHINE SERPL-MCNC: POSITIVE NG/ML
CALCIUM SPEC-SCNC: 9.4 MG/DL (ref 8.6–10.5)
CANNABINOIDS SERPL QL: NEGATIVE
CHLORIDE SERPL-SCNC: 98 MMOL/L (ref 98–107)
CLARITY UR: CLEAR
CO2 SERPL-SCNC: 24.2 MMOL/L (ref 22–29)
COCAINE UR QL: NEGATIVE
COLOR UR: YELLOW
CREAT SERPL-MCNC: 0.82 MG/DL (ref 0.57–1)
DEPRECATED RDW RBC AUTO: 44.9 FL (ref 37–54)
EGFRCR SERPLBLD CKD-EPI 2021: 85.7 ML/MIN/1.73
EOSINOPHIL # BLD AUTO: 0.17 10*3/MM3 (ref 0–0.4)
EOSINOPHIL NFR BLD AUTO: 1.5 % (ref 0.3–6.2)
ERYTHROCYTE [DISTWIDTH] IN BLOOD BY AUTOMATED COUNT: 13.1 % (ref 12.3–15.4)
ETHANOL BLD-MCNC: <10 MG/DL (ref 0–10)
ETHANOL UR QL: <0.01 %
FENTANYL UR-MCNC: NEGATIVE NG/ML
GLOBULIN UR ELPH-MCNC: 4 GM/DL
GLUCOSE SERPL-MCNC: 114 MG/DL (ref 65–99)
GLUCOSE UR STRIP-MCNC: NEGATIVE MG/DL
HCT VFR BLD AUTO: 39 % (ref 34–46.6)
HGB BLD-MCNC: 12.8 G/DL (ref 12–15.9)
HGB UR QL STRIP.AUTO: ABNORMAL
HOLD SPECIMEN: NORMAL
HYALINE CASTS UR QL AUTO: ABNORMAL /LPF
IMM GRANULOCYTES # BLD AUTO: 0.03 10*3/MM3 (ref 0–0.05)
IMM GRANULOCYTES NFR BLD AUTO: 0.3 % (ref 0–0.5)
KETONES UR QL STRIP: NEGATIVE
LEUKOCYTE ESTERASE UR QL STRIP.AUTO: ABNORMAL
LYMPHOCYTES # BLD AUTO: 2.66 10*3/MM3 (ref 0.7–3.1)
LYMPHOCYTES NFR BLD AUTO: 24.2 % (ref 19.6–45.3)
MAGNESIUM SERPL-MCNC: 1.8 MG/DL (ref 1.6–2.6)
MCH RBC QN AUTO: 30.8 PG (ref 26.6–33)
MCHC RBC AUTO-ENTMCNC: 32.8 G/DL (ref 31.5–35.7)
MCV RBC AUTO: 94 FL (ref 79–97)
METHADONE UR QL SCN: NEGATIVE
MONOCYTES # BLD AUTO: 1.08 10*3/MM3 (ref 0.1–0.9)
MONOCYTES NFR BLD AUTO: 9.8 % (ref 5–12)
NEUTROPHILS NFR BLD AUTO: 6.94 10*3/MM3 (ref 1.7–7)
NEUTROPHILS NFR BLD AUTO: 63.2 % (ref 42.7–76)
NITRITE UR QL STRIP: POSITIVE
NRBC BLD AUTO-RTO: 0 /100 WBC (ref 0–0.2)
OPIATES UR QL: NEGATIVE
OXYCODONE UR QL SCN: NEGATIVE
PCP UR QL SCN: NEGATIVE
PH UR STRIP.AUTO: 7 [PH] (ref 5–8)
PLATELET # BLD AUTO: 287 10*3/MM3 (ref 140–450)
PMV BLD AUTO: 10 FL (ref 6–12)
POTASSIUM SERPL-SCNC: 3.5 MMOL/L (ref 3.5–5.2)
PROT SERPL-MCNC: 8.1 G/DL (ref 6–8.5)
PROT UR QL STRIP: NEGATIVE
RBC # BLD AUTO: 4.15 10*6/MM3 (ref 3.77–5.28)
RBC # UR STRIP: ABNORMAL /HPF
REF LAB TEST METHOD: ABNORMAL
SODIUM SERPL-SCNC: 136 MMOL/L (ref 136–145)
SP GR UR STRIP: 1.01 (ref 1–1.03)
SQUAMOUS #/AREA URNS HPF: ABNORMAL /HPF
TRICYCLICS UR QL SCN: NEGATIVE
UROBILINOGEN UR QL STRIP: ABNORMAL
WBC # UR STRIP: ABNORMAL /HPF
WBC NRBC COR # BLD AUTO: 10.99 10*3/MM3 (ref 3.4–10.8)
WHOLE BLOOD HOLD COAG: NORMAL
WHOLE BLOOD HOLD SPECIMEN: NORMAL

## 2024-04-26 PROCEDURE — 83735 ASSAY OF MAGNESIUM: CPT | Performed by: STUDENT IN AN ORGANIZED HEALTH CARE EDUCATION/TRAINING PROGRAM

## 2024-04-26 PROCEDURE — 81001 URINALYSIS AUTO W/SCOPE: CPT | Performed by: STUDENT IN AN ORGANIZED HEALTH CARE EDUCATION/TRAINING PROGRAM

## 2024-04-26 PROCEDURE — 80053 COMPREHEN METABOLIC PANEL: CPT | Performed by: STUDENT IN AN ORGANIZED HEALTH CARE EDUCATION/TRAINING PROGRAM

## 2024-04-26 PROCEDURE — 36415 COLL VENOUS BLD VENIPUNCTURE: CPT

## 2024-04-26 PROCEDURE — 87086 URINE CULTURE/COLONY COUNT: CPT | Performed by: PHYSICIAN ASSISTANT

## 2024-04-26 PROCEDURE — 82077 ASSAY SPEC XCP UR&BREATH IA: CPT | Performed by: PHYSICIAN ASSISTANT

## 2024-04-26 PROCEDURE — 87088 URINE BACTERIA CULTURE: CPT | Performed by: PHYSICIAN ASSISTANT

## 2024-04-26 PROCEDURE — 85025 COMPLETE CBC W/AUTO DIFF WBC: CPT | Performed by: PHYSICIAN ASSISTANT

## 2024-04-26 PROCEDURE — 80307 DRUG TEST PRSMV CHEM ANLYZR: CPT | Performed by: STUDENT IN AN ORGANIZED HEALTH CARE EDUCATION/TRAINING PROGRAM

## 2024-04-26 PROCEDURE — 81025 URINE PREGNANCY TEST: CPT | Performed by: PHYSICIAN ASSISTANT

## 2024-04-26 PROCEDURE — 87186 SC STD MICRODIL/AGAR DIL: CPT | Performed by: PHYSICIAN ASSISTANT

## 2024-04-26 PROCEDURE — 99284 EMERGENCY DEPT VISIT MOD MDM: CPT

## 2024-04-26 RX ORDER — CEFDINIR 300 MG/1
300 CAPSULE ORAL 2 TIMES DAILY
Qty: 14 CAPSULE | Refills: 0 | Status: SHIPPED | OUTPATIENT
Start: 2024-04-26 | End: 2024-05-03

## 2024-04-26 NOTE — NURSING NOTE
Spoke to  discussed pt assessment. Instructed pt can be discharged and she needs to start back her prescribed medication today.   Pt should come back to ED if symptoms get worse.    ED provider made aware.  ED provider gave prescription for UTI.
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
## 2024-04-26 NOTE — DISCHARGE INSTRUCTIONS
Follow-up with your family doctor or your mental health provider in the office at the next available appointment.  Return to the ED at any time if symptoms change or worsen.  Please take your medications as prescribed.

## 2024-04-28 LAB — BACTERIA SPEC AEROBE CULT: ABNORMAL

## 2024-05-14 ENCOUNTER — APPOINTMENT (OUTPATIENT)
Dept: ULTRASOUND IMAGING | Facility: HOSPITAL | Age: 54
End: 2024-05-14
Payer: COMMERCIAL

## 2024-05-14 ENCOUNTER — HOSPITAL ENCOUNTER (EMERGENCY)
Facility: HOSPITAL | Age: 54
Discharge: HOME | End: 2024-05-14
Attending: STUDENT IN AN ORGANIZED HEALTH CARE EDUCATION/TRAINING PROGRAM
Payer: COMMERCIAL

## 2024-05-14 ENCOUNTER — APPOINTMENT (OUTPATIENT)
Dept: CT IMAGING | Facility: HOSPITAL | Age: 54
End: 2024-05-14
Payer: COMMERCIAL

## 2024-05-14 ENCOUNTER — OFFICE VISIT (OUTPATIENT)
Dept: UROLOGY | Facility: CLINIC | Age: 54
End: 2024-05-14
Payer: COMMERCIAL

## 2024-05-14 VITALS
SYSTOLIC BLOOD PRESSURE: 151 MMHG | HEART RATE: 106 BPM | HEIGHT: 63 IN | WEIGHT: 160.2 LBS | DIASTOLIC BLOOD PRESSURE: 92 MMHG | BODY MASS INDEX: 28.39 KG/M2

## 2024-05-14 VITALS
HEART RATE: 98 BPM | OXYGEN SATURATION: 99 % | DIASTOLIC BLOOD PRESSURE: 84 MMHG | RESPIRATION RATE: 16 BRPM | TEMPERATURE: 98.3 F | WEIGHT: 150 LBS | SYSTOLIC BLOOD PRESSURE: 178 MMHG | HEIGHT: 63 IN | BODY MASS INDEX: 26.58 KG/M2

## 2024-05-14 DIAGNOSIS — M79.89 RIGHT LEG SWELLING: ICD-10-CM

## 2024-05-14 DIAGNOSIS — N32.89 BLADDER MASS: Primary | ICD-10-CM

## 2024-05-14 DIAGNOSIS — Q62.11 HYDRONEPHROSIS WITH URETEROPELVIC JUNCTION (UPJ) OBSTRUCTION: ICD-10-CM

## 2024-05-14 DIAGNOSIS — Z48.816 AFTERCARE FOLLOWING SURGERY OF THE GENITOURINARY SYSTEM: ICD-10-CM

## 2024-05-14 LAB
ALBUMIN SERPL-MCNC: 4.1 G/DL (ref 3.5–5.2)
ALBUMIN/GLOB SERPL: 1 G/DL
ALP SERPL-CCNC: 99 U/L (ref 39–117)
ALT SERPL W P-5'-P-CCNC: 12 U/L (ref 1–33)
ANION GAP SERPL CALCULATED.3IONS-SCNC: 11.9 MMOL/L (ref 5–15)
APTT PPP: 28.6 SECONDS (ref 26.5–34.5)
AST SERPL-CCNC: 18 U/L (ref 1–32)
BASOPHILS # BLD AUTO: 0.1 10*3/MM3 (ref 0–0.2)
BASOPHILS NFR BLD AUTO: 0.9 % (ref 0–1.5)
BILIRUB SERPL-MCNC: 0.2 MG/DL (ref 0–1.2)
BUN SERPL-MCNC: 5 MG/DL (ref 6–20)
BUN/CREAT SERPL: 6 (ref 7–25)
CALCIUM SPEC-SCNC: 9.5 MG/DL (ref 8.6–10.5)
CHLORIDE SERPL-SCNC: 99 MMOL/L (ref 98–107)
CK SERPL-CCNC: 81 U/L (ref 20–180)
CO2 SERPL-SCNC: 28.1 MMOL/L (ref 22–29)
CREAT SERPL-MCNC: 0.84 MG/DL (ref 0.57–1)
CRP SERPL-MCNC: 0.42 MG/DL (ref 0–0.5)
D-LACTATE SERPL-SCNC: 1.6 MMOL/L (ref 0.5–2)
DEPRECATED RDW RBC AUTO: 44.9 FL (ref 37–54)
EGFRCR SERPLBLD CKD-EPI 2021: 83.2 ML/MIN/1.73
EOSINOPHIL # BLD AUTO: 0.41 10*3/MM3 (ref 0–0.4)
EOSINOPHIL NFR BLD AUTO: 3.9 % (ref 0.3–6.2)
ERYTHROCYTE [DISTWIDTH] IN BLOOD BY AUTOMATED COUNT: 13.1 % (ref 12.3–15.4)
ERYTHROCYTE [SEDIMENTATION RATE] IN BLOOD: 45 MM/HR (ref 0–30)
GLOBULIN UR ELPH-MCNC: 4.2 GM/DL
GLUCOSE SERPL-MCNC: 119 MG/DL (ref 65–99)
HCG SERPL QL: NEGATIVE
HCT VFR BLD AUTO: 40.6 % (ref 34–46.6)
HGB BLD-MCNC: 13.2 G/DL (ref 12–15.9)
HOLD SPECIMEN: NORMAL
IMM GRANULOCYTES # BLD AUTO: 0.03 10*3/MM3 (ref 0–0.05)
IMM GRANULOCYTES NFR BLD AUTO: 0.3 % (ref 0–0.5)
INR PPP: 0.89 (ref 0.9–1.1)
LYMPHOCYTES # BLD AUTO: 2.03 10*3/MM3 (ref 0.7–3.1)
LYMPHOCYTES NFR BLD AUTO: 19.1 % (ref 19.6–45.3)
MAGNESIUM SERPL-MCNC: 1.8 MG/DL (ref 1.6–2.6)
MCH RBC QN AUTO: 30.5 PG (ref 26.6–33)
MCHC RBC AUTO-ENTMCNC: 32.5 G/DL (ref 31.5–35.7)
MCV RBC AUTO: 93.8 FL (ref 79–97)
MONOCYTES # BLD AUTO: 1.06 10*3/MM3 (ref 0.1–0.9)
MONOCYTES NFR BLD AUTO: 10 % (ref 5–12)
NEUTROPHILS NFR BLD AUTO: 6.98 10*3/MM3 (ref 1.7–7)
NEUTROPHILS NFR BLD AUTO: 65.8 % (ref 42.7–76)
NRBC BLD AUTO-RTO: 0 /100 WBC (ref 0–0.2)
NT-PROBNP SERPL-MCNC: 354.8 PG/ML (ref 0–900)
PLATELET # BLD AUTO: 339 10*3/MM3 (ref 140–450)
PMV BLD AUTO: 9.8 FL (ref 6–12)
POTASSIUM SERPL-SCNC: 3.5 MMOL/L (ref 3.5–5.2)
PROCALCITONIN SERPL-MCNC: 0.14 NG/ML (ref 0–0.25)
PROT SERPL-MCNC: 8.3 G/DL (ref 6–8.5)
PROTHROMBIN TIME: 12.2 SECONDS (ref 12.1–14.7)
RBC # BLD AUTO: 4.33 10*6/MM3 (ref 3.77–5.28)
SODIUM SERPL-SCNC: 139 MMOL/L (ref 136–145)
T4 FREE SERPL-MCNC: 1.17 NG/DL (ref 0.93–1.7)
TSH SERPL DL<=0.05 MIU/L-ACNC: 1.16 UIU/ML (ref 0.27–4.2)
WBC NRBC COR # BLD AUTO: 10.61 10*3/MM3 (ref 3.4–10.8)
WHOLE BLOOD HOLD COAG: NORMAL
WHOLE BLOOD HOLD SPECIMEN: NORMAL

## 2024-05-14 PROCEDURE — 75635 CT ANGIO ABDOMINAL ARTERIES: CPT

## 2024-05-14 PROCEDURE — 99285 EMERGENCY DEPT VISIT HI MDM: CPT

## 2024-05-14 PROCEDURE — 93923 UPR/LXTR ART STDY 3+ LVLS: CPT

## 2024-05-14 PROCEDURE — 36415 COLL VENOUS BLD VENIPUNCTURE: CPT

## 2024-05-14 PROCEDURE — 75635 CT ANGIO ABDOMINAL ARTERIES: CPT | Performed by: RADIOLOGY

## 2024-05-14 PROCEDURE — 87040 BLOOD CULTURE FOR BACTERIA: CPT | Performed by: PHYSICIAN ASSISTANT

## 2024-05-14 PROCEDURE — 84145 PROCALCITONIN (PCT): CPT | Performed by: PHYSICIAN ASSISTANT

## 2024-05-14 PROCEDURE — 25510000001 IOPAMIDOL PER 1 ML: Performed by: STUDENT IN AN ORGANIZED HEALTH CARE EDUCATION/TRAINING PROGRAM

## 2024-05-14 PROCEDURE — 83605 ASSAY OF LACTIC ACID: CPT | Performed by: PHYSICIAN ASSISTANT

## 2024-05-14 PROCEDURE — 82550 ASSAY OF CK (CPK): CPT | Performed by: PHYSICIAN ASSISTANT

## 2024-05-14 PROCEDURE — 85652 RBC SED RATE AUTOMATED: CPT | Performed by: PHYSICIAN ASSISTANT

## 2024-05-14 PROCEDURE — 85610 PROTHROMBIN TIME: CPT | Performed by: PHYSICIAN ASSISTANT

## 2024-05-14 PROCEDURE — 83880 ASSAY OF NATRIURETIC PEPTIDE: CPT | Performed by: PHYSICIAN ASSISTANT

## 2024-05-14 PROCEDURE — 80050 GENERAL HEALTH PANEL: CPT | Performed by: PHYSICIAN ASSISTANT

## 2024-05-14 PROCEDURE — 85730 THROMBOPLASTIN TIME PARTIAL: CPT | Performed by: PHYSICIAN ASSISTANT

## 2024-05-14 PROCEDURE — 93970 EXTREMITY STUDY: CPT

## 2024-05-14 PROCEDURE — 84703 CHORIONIC GONADOTROPIN ASSAY: CPT | Performed by: PHYSICIAN ASSISTANT

## 2024-05-14 PROCEDURE — 86140 C-REACTIVE PROTEIN: CPT | Performed by: PHYSICIAN ASSISTANT

## 2024-05-14 PROCEDURE — 84439 ASSAY OF FREE THYROXINE: CPT | Performed by: PHYSICIAN ASSISTANT

## 2024-05-14 PROCEDURE — 25810000003 SODIUM CHLORIDE 0.9 % SOLUTION: Performed by: STUDENT IN AN ORGANIZED HEALTH CARE EDUCATION/TRAINING PROGRAM

## 2024-05-14 PROCEDURE — 93970 EXTREMITY STUDY: CPT | Performed by: RADIOLOGY

## 2024-05-14 PROCEDURE — 93923 UPR/LXTR ART STDY 3+ LVLS: CPT | Performed by: RADIOLOGY

## 2024-05-14 PROCEDURE — 83735 ASSAY OF MAGNESIUM: CPT | Performed by: PHYSICIAN ASSISTANT

## 2024-05-14 RX ORDER — SODIUM CHLORIDE 0.9 % (FLUSH) 0.9 %
10 SYRINGE (ML) INJECTION AS NEEDED
Status: DISCONTINUED | OUTPATIENT
Start: 2024-05-14 | End: 2024-05-14 | Stop reason: HOSPADM

## 2024-05-14 RX ORDER — SODIUM CHLORIDE 9 MG/ML
100 INJECTION, SOLUTION INTRAVENOUS CONTINUOUS
Status: DISCONTINUED | OUTPATIENT
Start: 2024-05-14 | End: 2024-05-14 | Stop reason: HOSPADM

## 2024-05-14 RX ORDER — NICOTINE 21 MG/24HR
1 PATCH, TRANSDERMAL 24 HOURS TRANSDERMAL ONCE
Status: DISCONTINUED | OUTPATIENT
Start: 2024-05-14 | End: 2024-05-14

## 2024-05-14 RX ORDER — GENTAMICIN 40 MG/ML
80 INJECTION, SOLUTION INTRAMUSCULAR; INTRAVENOUS ONCE
Status: COMPLETED | OUTPATIENT
Start: 2024-05-14 | End: 2024-05-14

## 2024-05-14 RX ORDER — OXYCODONE AND ACETAMINOPHEN 10; 325 MG/1; MG/1
1 TABLET ORAL EVERY 6 HOURS PRN
Qty: 8 TABLET | Refills: 0 | Status: SHIPPED | OUTPATIENT
Start: 2024-05-14 | End: 2024-05-20 | Stop reason: HOSPADM

## 2024-05-14 RX ADMIN — GENTAMICIN 80 MG: 40 INJECTION, SOLUTION INTRAMUSCULAR; INTRAVENOUS at 14:20

## 2024-05-14 RX ADMIN — IOPAMIDOL 92 ML: 755 INJECTION, SOLUTION INTRAVENOUS at 12:26

## 2024-05-14 RX ADMIN — SODIUM CHLORIDE 100 ML/HR: 9 INJECTION, SOLUTION INTRAVENOUS at 12:37

## 2024-05-14 NOTE — H&P (VIEW-ONLY)
"Chief Complaint:      Chief Complaint   Patient presents with    Bladder Mass     ER Follow up       HPI:   54 y.o. female is a new patient referred emergently with a swollen right leg she had been seen at the [facility] and had an MRI and was told she had a \"cyst\".  She is a  1 para 1 normal bowel movements.  30-pack-year smoking history.  No gross hematuria.  I did an urgent cystoscopy showing a significant right lesion in the bladder that does not have the appearance of transitional cell neoplasm I am to set her up for an urgent TURBT today the remainder of her exam was unremarkable.  Other than the significant right lymphedema    Past Medical History:     Past Medical History:   Diagnosis Date    Hypertension     Psychosis     Seizures     Substance abuse        Current Meds:     No current facility-administered medications for this visit.     Current Outpatient Medications   Medication Sig Dispense Refill    buprenorphine-naloxone (SUBOXONE) 8-2 MG per SL tablet Place 2 tablets under the tongue Daily. Indications: Opioid Use Disorder (Continuation of Therapy) 6 tablet 0    FLUoxetine (PROzac) 40 MG capsule Take 1 capsule by mouth Daily. Indications: Depression 30 capsule 0    OLANZapine (zyPREXA) 20 MG tablet Take 1 tablet by mouth Every Night. Indications: psychosis 30 tablet 0    prazosin (MINIPRESS) 2 MG capsule Take 1 capsule by mouth Every Night. Indications: Frightening Dreams 30 capsule 0    QUEtiapine (SEROquel) 200 MG tablet Take 1 tablet by mouth Every Night. Indications: Major Depressive Disorder 30 tablet 0    traZODone (DESYREL) 100 MG tablet Take 1 tablet by mouth At Night As Needed for Sleep. Indications: Trouble Sleeping 30 tablet 0     Facility-Administered Medications Ordered in Other Visits   Medication Dose Route Frequency Provider Last Rate Last Admin    sodium chloride 0.9 % flush 10 mL  10 mL Intravenous PRN Margaret Aquino PA        sodium chloride 0.9 % infusion  100 " mL/hr Intravenous Continuous Fatemeh Montoya  mL/hr at 05/14/24 1237 100 mL/hr at 05/14/24 1237        Allergies:      Allergies   Allergen Reactions    Codeine Itching and GI Intolerance        Past Surgical History:     Past Surgical History:   Procedure Laterality Date    ANKLE SURGERY      APPENDECTOMY      TONSILLECTOMY         Social History:     Social History     Socioeconomic History    Marital status:    Tobacco Use    Smoking status: Every Day     Current packs/day: 1.00     Types: Cigarettes    Smokeless tobacco: Never   Vaping Use    Vaping status: Never Used   Substance and Sexual Activity    Alcohol use: Not Currently     Comment: denies at this time.    Drug use: Defer     Types: Other     Comment: DENIES drug use but on suboxone    Sexual activity: Yes     Partners: Male       Family History:     No family history on file.    Review of Systems:     Review of Systems   Constitutional: Negative.  Negative for activity change, appetite change, chills, diaphoresis, fatigue and unexpected weight change.   HENT:  Negative for congestion, dental problem, drooling, ear discharge, ear pain, facial swelling, hearing loss, mouth sores, nosebleeds, postnasal drip, rhinorrhea, sinus pressure, sneezing, sore throat, tinnitus, trouble swallowing and voice change.    Eyes: Negative.  Negative for photophobia, pain, discharge, redness, itching and visual disturbance.   Respiratory: Negative.  Negative for apnea, cough, choking, chest tightness, shortness of breath, wheezing and stridor.    Cardiovascular: Negative.  Negative for chest pain, palpitations and leg swelling.   Gastrointestinal: Negative.  Negative for abdominal distention, abdominal pain, anal bleeding, blood in stool, constipation, diarrhea, nausea, rectal pain and vomiting.   Endocrine: Negative.  Negative for cold intolerance, heat intolerance, polydipsia, polyphagia and polyuria.   Musculoskeletal:  Positive for joint swelling. Negative  for arthralgias, back pain, gait problem, myalgias, neck pain and neck stiffness.   Skin: Negative.  Negative for color change, pallor, rash and wound.   Allergic/Immunologic: Negative.  Negative for environmental allergies, food allergies and immunocompromised state.   Neurological: Negative.  Negative for dizziness, tremors, seizures, syncope, facial asymmetry, speech difficulty, weakness, light-headedness, numbness and headaches.   Hematological: Negative.  Negative for adenopathy. Does not bruise/bleed easily.   Psychiatric/Behavioral:  Negative for agitation, behavioral problems, confusion, decreased concentration, dysphoric mood, hallucinations, self-injury, sleep disturbance and suicidal ideas. The patient is not nervous/anxious and is not hyperactive.    All other systems reviewed and are negative.      Physical Exam:     Physical Exam  Constitutional:       Appearance: She is well-developed.   HENT:      Head: Normocephalic and atraumatic.      Right Ear: External ear normal.      Left Ear: External ear normal.   Eyes:      Conjunctiva/sclera: Conjunctivae normal.      Pupils: Pupils are equal, round, and reactive to light.   Cardiovascular:      Rate and Rhythm: Normal rate and regular rhythm.      Heart sounds: Normal heart sounds.   Pulmonary:      Effort: Pulmonary effort is normal.      Breath sounds: Normal breath sounds.   Abdominal:      General: Bowel sounds are normal. There is no distension.      Palpations: Abdomen is soft. There is no mass.      Tenderness: There is no abdominal tenderness. There is no guarding or rebound.   Genitourinary:     General: Normal vulva.      Vagina: No vaginal discharge.   Musculoskeletal:         General: Normal range of motion.   Skin:     General: Skin is warm and dry.   Neurological:      Mental Status: She is alert.      Deep Tendon Reflexes: Reflexes are normal and symmetric.   Psychiatric:         Behavior: Behavior normal.         Thought Content: Thought  content normal.         Judgment: Judgment normal.         I have reviewed the following portions of the patient's history: Allergies, current medications, past family history, past medical history, past social history, past surgical history, problem list, and ROS and confirm it is accurate.    Recent Image (CT and/or KUB):      CT Abdomen and Pelvis: No results found for this or any previous visit.       CT Stone Protocol: No results found for this or any previous visit.       KUB: No results found for this or any previous visit.       Labs (past 3 months):      Admission on 05/14/2024   Component Date Value Ref Range Status    Glucose 05/14/2024 119 (H)  65 - 99 mg/dL Final    BUN 05/14/2024 5 (L)  6 - 20 mg/dL Final    Creatinine 05/14/2024 0.84  0.57 - 1.00 mg/dL Final    Sodium 05/14/2024 139  136 - 145 mmol/L Final    Potassium 05/14/2024 3.5  3.5 - 5.2 mmol/L Final    Chloride 05/14/2024 99  98 - 107 mmol/L Final    CO2 05/14/2024 28.1  22.0 - 29.0 mmol/L Final    Calcium 05/14/2024 9.5  8.6 - 10.5 mg/dL Final    Total Protein 05/14/2024 8.3  6.0 - 8.5 g/dL Final    Albumin 05/14/2024 4.1  3.5 - 5.2 g/dL Final    ALT (SGPT) 05/14/2024 12  1 - 33 U/L Final    AST (SGOT) 05/14/2024 18  1 - 32 U/L Final    Alkaline Phosphatase 05/14/2024 99  39 - 117 U/L Final    Total Bilirubin 05/14/2024 0.2  0.0 - 1.2 mg/dL Final    Globulin 05/14/2024 4.2  gm/dL Final    A/G Ratio 05/14/2024 1.0  g/dL Final    BUN/Creatinine Ratio 05/14/2024 6.0 (L)  7.0 - 25.0 Final    Anion Gap 05/14/2024 11.9  5.0 - 15.0 mmol/L Final    eGFR 05/14/2024 83.2  >60.0 mL/min/1.73 Final    Protime 05/14/2024 12.2  12.1 - 14.7 Seconds Final    INR 05/14/2024 0.89 (L)  0.90 - 1.10 Final    PTT 05/14/2024 28.6  26.5 - 34.5 seconds Final    HCG Qualitative 05/14/2024 Negative  Negative Final    proBNP 05/14/2024 354.8  0.0 - 900.0 pg/mL Final    Lactate 05/14/2024 1.6  0.5 - 2.0 mmol/L Final    Procalcitonin 05/14/2024 0.14  0.00 - 0.25 ng/mL  Final    Sed Rate 05/14/2024 45 (H)  0 - 30 mm/hr Final    C-Reactive Protein 05/14/2024 0.42  0.00 - 0.50 mg/dL Final    Creatine Kinase 05/14/2024 81  20 - 180 U/L Final    Magnesium 05/14/2024 1.8  1.6 - 2.6 mg/dL Final    TSH 05/14/2024 1.160  0.270 - 4.200 uIU/mL Final    Free T4 05/14/2024 1.17  0.93 - 1.70 ng/dL Final    WBC 05/14/2024 10.61  3.40 - 10.80 10*3/mm3 Final    RBC 05/14/2024 4.33  3.77 - 5.28 10*6/mm3 Final    Hemoglobin 05/14/2024 13.2  12.0 - 15.9 g/dL Final    Hematocrit 05/14/2024 40.6  34.0 - 46.6 % Final    MCV 05/14/2024 93.8  79.0 - 97.0 fL Final    MCH 05/14/2024 30.5  26.6 - 33.0 pg Final    MCHC 05/14/2024 32.5  31.5 - 35.7 g/dL Final    RDW 05/14/2024 13.1  12.3 - 15.4 % Final    RDW-SD 05/14/2024 44.9  37.0 - 54.0 fl Final    MPV 05/14/2024 9.8  6.0 - 12.0 fL Final    Platelets 05/14/2024 339  140 - 450 10*3/mm3 Final    Neutrophil % 05/14/2024 65.8  42.7 - 76.0 % Final    Lymphocyte % 05/14/2024 19.1 (L)  19.6 - 45.3 % Final    Monocyte % 05/14/2024 10.0  5.0 - 12.0 % Final    Eosinophil % 05/14/2024 3.9  0.3 - 6.2 % Final    Basophil % 05/14/2024 0.9  0.0 - 1.5 % Final    Immature Grans % 05/14/2024 0.3  0.0 - 0.5 % Final    Neutrophils, Absolute 05/14/2024 6.98  1.70 - 7.00 10*3/mm3 Final    Lymphocytes, Absolute 05/14/2024 2.03  0.70 - 3.10 10*3/mm3 Final    Monocytes, Absolute 05/14/2024 1.06 (H)  0.10 - 0.90 10*3/mm3 Final    Eosinophils, Absolute 05/14/2024 0.41 (H)  0.00 - 0.40 10*3/mm3 Final    Basophils, Absolute 05/14/2024 0.10  0.00 - 0.20 10*3/mm3 Final    Immature Grans, Absolute 05/14/2024 0.03  0.00 - 0.05 10*3/mm3 Final    nRBC 05/14/2024 0.0  0.0 - 0.2 /100 WBC Final    Extra Tube 05/14/2024 Hold for add-ons.   Final    Auto resulted.    Extra Tube 05/14/2024 hold for add-on   Final    Auto resulted    Extra Tube 05/14/2024 Hold for add-ons.   Final    Auto resulted   Admission on 04/26/2024, Discharged on 04/26/2024   Component Date Value Ref Range Status     Glucose 04/26/2024 114 (H)  65 - 99 mg/dL Final    BUN 04/26/2024 4 (L)  6 - 20 mg/dL Final    Creatinine 04/26/2024 0.82  0.57 - 1.00 mg/dL Final    Sodium 04/26/2024 136  136 - 145 mmol/L Final    Potassium 04/26/2024 3.5  3.5 - 5.2 mmol/L Final    Chloride 04/26/2024 98  98 - 107 mmol/L Final    CO2 04/26/2024 24.2  22.0 - 29.0 mmol/L Final    Calcium 04/26/2024 9.4  8.6 - 10.5 mg/dL Final    Total Protein 04/26/2024 8.1  6.0 - 8.5 g/dL Final    Albumin 04/26/2024 4.1  3.5 - 5.2 g/dL Final    ALT (SGPT) 04/26/2024 13  1 - 33 U/L Final    AST (SGOT) 04/26/2024 17  1 - 32 U/L Final    Alkaline Phosphatase 04/26/2024 86  39 - 117 U/L Final    Total Bilirubin 04/26/2024 0.3  0.0 - 1.2 mg/dL Final    Globulin 04/26/2024 4.0  gm/dL Final    A/G Ratio 04/26/2024 1.0  g/dL Final    BUN/Creatinine Ratio 04/26/2024 4.9 (L)  7.0 - 25.0 Final    Anion Gap 04/26/2024 13.8  5.0 - 15.0 mmol/L Final    eGFR 04/26/2024 85.7  >60.0 mL/min/1.73 Final    Color, UA 04/26/2024 Yellow  Yellow, Straw Final    Appearance, UA 04/26/2024 Clear  Clear Final    pH, UA 04/26/2024 7.0  5.0 - 8.0 Final    Specific Gravity, UA 04/26/2024 1.008  1.005 - 1.030 Final    Glucose, UA 04/26/2024 Negative  Negative Final    Ketones, UA 04/26/2024 Negative  Negative Final    Bilirubin, UA 04/26/2024 Negative  Negative Final    Blood, UA 04/26/2024 Trace (A)  Negative Final    Protein, UA 04/26/2024 Negative  Negative Final    Leuk Esterase, UA 04/26/2024 Moderate (2+) (A)  Negative Final    Nitrite, UA 04/26/2024 Positive (A)  Negative Final    Urobilinogen, UA 04/26/2024 1.0 E.U./dL  0.2 - 1.0 E.U./dL Final    THC, Screen, Urine 04/26/2024 Negative  Negative Final    Phencyclidine (PCP), Urine 04/26/2024 Negative  Negative Final    Cocaine Screen, Urine 04/26/2024 Negative  Negative Final    Methamphetamine, Ur 04/26/2024 Negative  Negative Final    Opiate Screen 04/26/2024 Negative  Negative Final    Amphetamine Screen, Urine 04/26/2024 Negative   Negative Final    Benzodiazepine Screen, Urine 04/26/2024 Negative  Negative Final    Tricyclic Antidepressants Screen 04/26/2024 Negative  Negative Final    Methadone Screen, Urine 04/26/2024 Negative  Negative Final    Barbiturates Screen, Urine 04/26/2024 Negative  Negative Final    Oxycodone Screen, Urine 04/26/2024 Negative  Negative Final    Buprenorphine, Screen, Urine 04/26/2024 Positive (A)  Negative Final    Magnesium 04/26/2024 1.8  1.6 - 2.6 mg/dL Final    HCG, Urine QL 04/26/2024 Negative  Negative Final    Ethanol 04/26/2024 <10  0 - 10 mg/dL Final    Ethanol % 04/26/2024 <0.010  % Final    WBC 04/26/2024 10.99 (H)  3.40 - 10.80 10*3/mm3 Final    RBC 04/26/2024 4.15  3.77 - 5.28 10*6/mm3 Final    Hemoglobin 04/26/2024 12.8  12.0 - 15.9 g/dL Final    Hematocrit 04/26/2024 39.0  34.0 - 46.6 % Final    MCV 04/26/2024 94.0  79.0 - 97.0 fL Final    MCH 04/26/2024 30.8  26.6 - 33.0 pg Final    MCHC 04/26/2024 32.8  31.5 - 35.7 g/dL Final    RDW 04/26/2024 13.1  12.3 - 15.4 % Final    RDW-SD 04/26/2024 44.9  37.0 - 54.0 fl Final    MPV 04/26/2024 10.0  6.0 - 12.0 fL Final    Platelets 04/26/2024 287  140 - 450 10*3/mm3 Final    Neutrophil % 04/26/2024 63.2  42.7 - 76.0 % Final    Lymphocyte % 04/26/2024 24.2  19.6 - 45.3 % Final    Monocyte % 04/26/2024 9.8  5.0 - 12.0 % Final    Eosinophil % 04/26/2024 1.5  0.3 - 6.2 % Final    Basophil % 04/26/2024 1.0  0.0 - 1.5 % Final    Immature Grans % 04/26/2024 0.3  0.0 - 0.5 % Final    Neutrophils, Absolute 04/26/2024 6.94  1.70 - 7.00 10*3/mm3 Final    Lymphocytes, Absolute 04/26/2024 2.66  0.70 - 3.10 10*3/mm3 Final    Monocytes, Absolute 04/26/2024 1.08 (H)  0.10 - 0.90 10*3/mm3 Final    Eosinophils, Absolute 04/26/2024 0.17  0.00 - 0.40 10*3/mm3 Final    Basophils, Absolute 04/26/2024 0.11  0.00 - 0.20 10*3/mm3 Final    Immature Grans, Absolute 04/26/2024 0.03  0.00 - 0.05 10*3/mm3 Final    nRBC 04/26/2024 0.0  0.0 - 0.2 /100 WBC Final    Extra Tube 04/26/2024  Hold for add-ons.   Final    Auto resulted.    Extra Tube 04/26/2024 hold for add-on   Final    Auto resulted    Extra Tube 04/26/2024 Hold for add-ons.   Final    Auto resulted.    Extra Tube 04/26/2024 Hold for add-ons.   Final    Auto resulted    Fentanyl, Urine 04/26/2024 Negative  Negative Final    RBC, UA 04/26/2024 3-5 (A)  None Seen, 0-2 /HPF Final    WBC, UA 04/26/2024 21-50 (A)  None Seen, 0-2 /HPF Final    Bacteria, UA 04/26/2024 4+ (A)  None Seen /HPF Final    Squamous Epithelial Cells, UA 04/26/2024 0-2  None Seen, 0-2 /HPF Final    Hyaline Casts, UA 04/26/2024 None Seen  None Seen /LPF Final    Methodology 04/26/2024 Automated Microscopy   Final    Extra Tube 04/26/2024 Hold for add-ons.   Final    Auto resulted.    Urine Culture 04/26/2024 >100,000 CFU/mL Escherichia coli (A)   Final        Procedure:   Cystoscopy:  Patient presents today for cystourethroscopy.  I went ahead and obtained an informed consent including the risks of anesthesia, bleeding, infection, etc.  After prepping and draping in a sterile fashion in the low dorsal lithotomy position, the urethra was gently anesthetized with 10 mL of 2% viscous Xylocaine jelly.  After an appropriate period of topical anesthesia, I used the Olympus digital 14 [place] flexible cystoscope to examine the anterior urethra which was completely normal.  The ureteral orifices were visualized and normal in position and configuration. There were no stones,  or foreign bodies.  There was a right right lateral submucosal bladder tumor.  She was given 80 mg of gentamicin as prophylaxis  Assessment/Plan:   Bladder tumor-with swollen right lower extremity consistent with metastatic disease I am to set her up for TURBT as soon as possible            This document has been electronically signed by JO ANN GOMEZ MD May 14, 2024 13:38 EDT    Dictated Utilizing Dragon Dictation: Part of this note may be an electronic transcription/translation of spoken language to  printed text using the Dragon Dictation System.

## 2024-05-14 NOTE — PROGRESS NOTES
"Chief Complaint:      Chief Complaint   Patient presents with    Bladder Mass     ER Follow up       HPI:   54 y.o. female is a new patient referred emergently with a swollen right leg she had been seen at the [facility] and had an MRI and was told she had a \"cyst\".  She is a  1 para 1 normal bowel movements.  30-pack-year smoking history.  No gross hematuria.  I did an urgent cystoscopy showing a significant right lesion in the bladder that does not have the appearance of transitional cell neoplasm I am to set her up for an urgent TURBT today the remainder of her exam was unremarkable.  Other than the significant right lymphedema    Past Medical History:     Past Medical History:   Diagnosis Date    Hypertension     Psychosis     Seizures     Substance abuse        Current Meds:     No current facility-administered medications for this visit.     Current Outpatient Medications   Medication Sig Dispense Refill    buprenorphine-naloxone (SUBOXONE) 8-2 MG per SL tablet Place 2 tablets under the tongue Daily. Indications: Opioid Use Disorder (Continuation of Therapy) 6 tablet 0    FLUoxetine (PROzac) 40 MG capsule Take 1 capsule by mouth Daily. Indications: Depression 30 capsule 0    OLANZapine (zyPREXA) 20 MG tablet Take 1 tablet by mouth Every Night. Indications: psychosis 30 tablet 0    prazosin (MINIPRESS) 2 MG capsule Take 1 capsule by mouth Every Night. Indications: Frightening Dreams 30 capsule 0    QUEtiapine (SEROquel) 200 MG tablet Take 1 tablet by mouth Every Night. Indications: Major Depressive Disorder 30 tablet 0    traZODone (DESYREL) 100 MG tablet Take 1 tablet by mouth At Night As Needed for Sleep. Indications: Trouble Sleeping 30 tablet 0     Facility-Administered Medications Ordered in Other Visits   Medication Dose Route Frequency Provider Last Rate Last Admin    sodium chloride 0.9 % flush 10 mL  10 mL Intravenous PRN Margaret Aquino PA        sodium chloride 0.9 % infusion  100 " mL/hr Intravenous Continuous Fatemeh Montoya  mL/hr at 05/14/24 1237 100 mL/hr at 05/14/24 1237        Allergies:      Allergies   Allergen Reactions    Codeine Itching and GI Intolerance        Past Surgical History:     Past Surgical History:   Procedure Laterality Date    ANKLE SURGERY      APPENDECTOMY      TONSILLECTOMY         Social History:     Social History     Socioeconomic History    Marital status:    Tobacco Use    Smoking status: Every Day     Current packs/day: 1.00     Types: Cigarettes    Smokeless tobacco: Never   Vaping Use    Vaping status: Never Used   Substance and Sexual Activity    Alcohol use: Not Currently     Comment: denies at this time.    Drug use: Defer     Types: Other     Comment: DENIES drug use but on suboxone    Sexual activity: Yes     Partners: Male       Family History:     No family history on file.    Review of Systems:     Review of Systems   Constitutional: Negative.  Negative for activity change, appetite change, chills, diaphoresis, fatigue and unexpected weight change.   HENT:  Negative for congestion, dental problem, drooling, ear discharge, ear pain, facial swelling, hearing loss, mouth sores, nosebleeds, postnasal drip, rhinorrhea, sinus pressure, sneezing, sore throat, tinnitus, trouble swallowing and voice change.    Eyes: Negative.  Negative for photophobia, pain, discharge, redness, itching and visual disturbance.   Respiratory: Negative.  Negative for apnea, cough, choking, chest tightness, shortness of breath, wheezing and stridor.    Cardiovascular: Negative.  Negative for chest pain, palpitations and leg swelling.   Gastrointestinal: Negative.  Negative for abdominal distention, abdominal pain, anal bleeding, blood in stool, constipation, diarrhea, nausea, rectal pain and vomiting.   Endocrine: Negative.  Negative for cold intolerance, heat intolerance, polydipsia, polyphagia and polyuria.   Musculoskeletal:  Positive for joint swelling. Negative  for arthralgias, back pain, gait problem, myalgias, neck pain and neck stiffness.   Skin: Negative.  Negative for color change, pallor, rash and wound.   Allergic/Immunologic: Negative.  Negative for environmental allergies, food allergies and immunocompromised state.   Neurological: Negative.  Negative for dizziness, tremors, seizures, syncope, facial asymmetry, speech difficulty, weakness, light-headedness, numbness and headaches.   Hematological: Negative.  Negative for adenopathy. Does not bruise/bleed easily.   Psychiatric/Behavioral:  Negative for agitation, behavioral problems, confusion, decreased concentration, dysphoric mood, hallucinations, self-injury, sleep disturbance and suicidal ideas. The patient is not nervous/anxious and is not hyperactive.    All other systems reviewed and are negative.      Physical Exam:     Physical Exam  Constitutional:       Appearance: She is well-developed.   HENT:      Head: Normocephalic and atraumatic.      Right Ear: External ear normal.      Left Ear: External ear normal.   Eyes:      Conjunctiva/sclera: Conjunctivae normal.      Pupils: Pupils are equal, round, and reactive to light.   Cardiovascular:      Rate and Rhythm: Normal rate and regular rhythm.      Heart sounds: Normal heart sounds.   Pulmonary:      Effort: Pulmonary effort is normal.      Breath sounds: Normal breath sounds.   Abdominal:      General: Bowel sounds are normal. There is no distension.      Palpations: Abdomen is soft. There is no mass.      Tenderness: There is no abdominal tenderness. There is no guarding or rebound.   Genitourinary:     General: Normal vulva.      Vagina: No vaginal discharge.   Musculoskeletal:         General: Normal range of motion.   Skin:     General: Skin is warm and dry.   Neurological:      Mental Status: She is alert.      Deep Tendon Reflexes: Reflexes are normal and symmetric.   Psychiatric:         Behavior: Behavior normal.         Thought Content: Thought  content normal.         Judgment: Judgment normal.         I have reviewed the following portions of the patient's history: Allergies, current medications, past family history, past medical history, past social history, past surgical history, problem list, and ROS and confirm it is accurate.    Recent Image (CT and/or KUB):      CT Abdomen and Pelvis: No results found for this or any previous visit.       CT Stone Protocol: No results found for this or any previous visit.       KUB: No results found for this or any previous visit.       Labs (past 3 months):      Admission on 05/14/2024   Component Date Value Ref Range Status    Glucose 05/14/2024 119 (H)  65 - 99 mg/dL Final    BUN 05/14/2024 5 (L)  6 - 20 mg/dL Final    Creatinine 05/14/2024 0.84  0.57 - 1.00 mg/dL Final    Sodium 05/14/2024 139  136 - 145 mmol/L Final    Potassium 05/14/2024 3.5  3.5 - 5.2 mmol/L Final    Chloride 05/14/2024 99  98 - 107 mmol/L Final    CO2 05/14/2024 28.1  22.0 - 29.0 mmol/L Final    Calcium 05/14/2024 9.5  8.6 - 10.5 mg/dL Final    Total Protein 05/14/2024 8.3  6.0 - 8.5 g/dL Final    Albumin 05/14/2024 4.1  3.5 - 5.2 g/dL Final    ALT (SGPT) 05/14/2024 12  1 - 33 U/L Final    AST (SGOT) 05/14/2024 18  1 - 32 U/L Final    Alkaline Phosphatase 05/14/2024 99  39 - 117 U/L Final    Total Bilirubin 05/14/2024 0.2  0.0 - 1.2 mg/dL Final    Globulin 05/14/2024 4.2  gm/dL Final    A/G Ratio 05/14/2024 1.0  g/dL Final    BUN/Creatinine Ratio 05/14/2024 6.0 (L)  7.0 - 25.0 Final    Anion Gap 05/14/2024 11.9  5.0 - 15.0 mmol/L Final    eGFR 05/14/2024 83.2  >60.0 mL/min/1.73 Final    Protime 05/14/2024 12.2  12.1 - 14.7 Seconds Final    INR 05/14/2024 0.89 (L)  0.90 - 1.10 Final    PTT 05/14/2024 28.6  26.5 - 34.5 seconds Final    HCG Qualitative 05/14/2024 Negative  Negative Final    proBNP 05/14/2024 354.8  0.0 - 900.0 pg/mL Final    Lactate 05/14/2024 1.6  0.5 - 2.0 mmol/L Final    Procalcitonin 05/14/2024 0.14  0.00 - 0.25 ng/mL  Final    Sed Rate 05/14/2024 45 (H)  0 - 30 mm/hr Final    C-Reactive Protein 05/14/2024 0.42  0.00 - 0.50 mg/dL Final    Creatine Kinase 05/14/2024 81  20 - 180 U/L Final    Magnesium 05/14/2024 1.8  1.6 - 2.6 mg/dL Final    TSH 05/14/2024 1.160  0.270 - 4.200 uIU/mL Final    Free T4 05/14/2024 1.17  0.93 - 1.70 ng/dL Final    WBC 05/14/2024 10.61  3.40 - 10.80 10*3/mm3 Final    RBC 05/14/2024 4.33  3.77 - 5.28 10*6/mm3 Final    Hemoglobin 05/14/2024 13.2  12.0 - 15.9 g/dL Final    Hematocrit 05/14/2024 40.6  34.0 - 46.6 % Final    MCV 05/14/2024 93.8  79.0 - 97.0 fL Final    MCH 05/14/2024 30.5  26.6 - 33.0 pg Final    MCHC 05/14/2024 32.5  31.5 - 35.7 g/dL Final    RDW 05/14/2024 13.1  12.3 - 15.4 % Final    RDW-SD 05/14/2024 44.9  37.0 - 54.0 fl Final    MPV 05/14/2024 9.8  6.0 - 12.0 fL Final    Platelets 05/14/2024 339  140 - 450 10*3/mm3 Final    Neutrophil % 05/14/2024 65.8  42.7 - 76.0 % Final    Lymphocyte % 05/14/2024 19.1 (L)  19.6 - 45.3 % Final    Monocyte % 05/14/2024 10.0  5.0 - 12.0 % Final    Eosinophil % 05/14/2024 3.9  0.3 - 6.2 % Final    Basophil % 05/14/2024 0.9  0.0 - 1.5 % Final    Immature Grans % 05/14/2024 0.3  0.0 - 0.5 % Final    Neutrophils, Absolute 05/14/2024 6.98  1.70 - 7.00 10*3/mm3 Final    Lymphocytes, Absolute 05/14/2024 2.03  0.70 - 3.10 10*3/mm3 Final    Monocytes, Absolute 05/14/2024 1.06 (H)  0.10 - 0.90 10*3/mm3 Final    Eosinophils, Absolute 05/14/2024 0.41 (H)  0.00 - 0.40 10*3/mm3 Final    Basophils, Absolute 05/14/2024 0.10  0.00 - 0.20 10*3/mm3 Final    Immature Grans, Absolute 05/14/2024 0.03  0.00 - 0.05 10*3/mm3 Final    nRBC 05/14/2024 0.0  0.0 - 0.2 /100 WBC Final    Extra Tube 05/14/2024 Hold for add-ons.   Final    Auto resulted.    Extra Tube 05/14/2024 hold for add-on   Final    Auto resulted    Extra Tube 05/14/2024 Hold for add-ons.   Final    Auto resulted   Admission on 04/26/2024, Discharged on 04/26/2024   Component Date Value Ref Range Status     Glucose 04/26/2024 114 (H)  65 - 99 mg/dL Final    BUN 04/26/2024 4 (L)  6 - 20 mg/dL Final    Creatinine 04/26/2024 0.82  0.57 - 1.00 mg/dL Final    Sodium 04/26/2024 136  136 - 145 mmol/L Final    Potassium 04/26/2024 3.5  3.5 - 5.2 mmol/L Final    Chloride 04/26/2024 98  98 - 107 mmol/L Final    CO2 04/26/2024 24.2  22.0 - 29.0 mmol/L Final    Calcium 04/26/2024 9.4  8.6 - 10.5 mg/dL Final    Total Protein 04/26/2024 8.1  6.0 - 8.5 g/dL Final    Albumin 04/26/2024 4.1  3.5 - 5.2 g/dL Final    ALT (SGPT) 04/26/2024 13  1 - 33 U/L Final    AST (SGOT) 04/26/2024 17  1 - 32 U/L Final    Alkaline Phosphatase 04/26/2024 86  39 - 117 U/L Final    Total Bilirubin 04/26/2024 0.3  0.0 - 1.2 mg/dL Final    Globulin 04/26/2024 4.0  gm/dL Final    A/G Ratio 04/26/2024 1.0  g/dL Final    BUN/Creatinine Ratio 04/26/2024 4.9 (L)  7.0 - 25.0 Final    Anion Gap 04/26/2024 13.8  5.0 - 15.0 mmol/L Final    eGFR 04/26/2024 85.7  >60.0 mL/min/1.73 Final    Color, UA 04/26/2024 Yellow  Yellow, Straw Final    Appearance, UA 04/26/2024 Clear  Clear Final    pH, UA 04/26/2024 7.0  5.0 - 8.0 Final    Specific Gravity, UA 04/26/2024 1.008  1.005 - 1.030 Final    Glucose, UA 04/26/2024 Negative  Negative Final    Ketones, UA 04/26/2024 Negative  Negative Final    Bilirubin, UA 04/26/2024 Negative  Negative Final    Blood, UA 04/26/2024 Trace (A)  Negative Final    Protein, UA 04/26/2024 Negative  Negative Final    Leuk Esterase, UA 04/26/2024 Moderate (2+) (A)  Negative Final    Nitrite, UA 04/26/2024 Positive (A)  Negative Final    Urobilinogen, UA 04/26/2024 1.0 E.U./dL  0.2 - 1.0 E.U./dL Final    THC, Screen, Urine 04/26/2024 Negative  Negative Final    Phencyclidine (PCP), Urine 04/26/2024 Negative  Negative Final    Cocaine Screen, Urine 04/26/2024 Negative  Negative Final    Methamphetamine, Ur 04/26/2024 Negative  Negative Final    Opiate Screen 04/26/2024 Negative  Negative Final    Amphetamine Screen, Urine 04/26/2024 Negative   Negative Final    Benzodiazepine Screen, Urine 04/26/2024 Negative  Negative Final    Tricyclic Antidepressants Screen 04/26/2024 Negative  Negative Final    Methadone Screen, Urine 04/26/2024 Negative  Negative Final    Barbiturates Screen, Urine 04/26/2024 Negative  Negative Final    Oxycodone Screen, Urine 04/26/2024 Negative  Negative Final    Buprenorphine, Screen, Urine 04/26/2024 Positive (A)  Negative Final    Magnesium 04/26/2024 1.8  1.6 - 2.6 mg/dL Final    HCG, Urine QL 04/26/2024 Negative  Negative Final    Ethanol 04/26/2024 <10  0 - 10 mg/dL Final    Ethanol % 04/26/2024 <0.010  % Final    WBC 04/26/2024 10.99 (H)  3.40 - 10.80 10*3/mm3 Final    RBC 04/26/2024 4.15  3.77 - 5.28 10*6/mm3 Final    Hemoglobin 04/26/2024 12.8  12.0 - 15.9 g/dL Final    Hematocrit 04/26/2024 39.0  34.0 - 46.6 % Final    MCV 04/26/2024 94.0  79.0 - 97.0 fL Final    MCH 04/26/2024 30.8  26.6 - 33.0 pg Final    MCHC 04/26/2024 32.8  31.5 - 35.7 g/dL Final    RDW 04/26/2024 13.1  12.3 - 15.4 % Final    RDW-SD 04/26/2024 44.9  37.0 - 54.0 fl Final    MPV 04/26/2024 10.0  6.0 - 12.0 fL Final    Platelets 04/26/2024 287  140 - 450 10*3/mm3 Final    Neutrophil % 04/26/2024 63.2  42.7 - 76.0 % Final    Lymphocyte % 04/26/2024 24.2  19.6 - 45.3 % Final    Monocyte % 04/26/2024 9.8  5.0 - 12.0 % Final    Eosinophil % 04/26/2024 1.5  0.3 - 6.2 % Final    Basophil % 04/26/2024 1.0  0.0 - 1.5 % Final    Immature Grans % 04/26/2024 0.3  0.0 - 0.5 % Final    Neutrophils, Absolute 04/26/2024 6.94  1.70 - 7.00 10*3/mm3 Final    Lymphocytes, Absolute 04/26/2024 2.66  0.70 - 3.10 10*3/mm3 Final    Monocytes, Absolute 04/26/2024 1.08 (H)  0.10 - 0.90 10*3/mm3 Final    Eosinophils, Absolute 04/26/2024 0.17  0.00 - 0.40 10*3/mm3 Final    Basophils, Absolute 04/26/2024 0.11  0.00 - 0.20 10*3/mm3 Final    Immature Grans, Absolute 04/26/2024 0.03  0.00 - 0.05 10*3/mm3 Final    nRBC 04/26/2024 0.0  0.0 - 0.2 /100 WBC Final    Extra Tube 04/26/2024  Hold for add-ons.   Final    Auto resulted.    Extra Tube 04/26/2024 hold for add-on   Final    Auto resulted    Extra Tube 04/26/2024 Hold for add-ons.   Final    Auto resulted.    Extra Tube 04/26/2024 Hold for add-ons.   Final    Auto resulted    Fentanyl, Urine 04/26/2024 Negative  Negative Final    RBC, UA 04/26/2024 3-5 (A)  None Seen, 0-2 /HPF Final    WBC, UA 04/26/2024 21-50 (A)  None Seen, 0-2 /HPF Final    Bacteria, UA 04/26/2024 4+ (A)  None Seen /HPF Final    Squamous Epithelial Cells, UA 04/26/2024 0-2  None Seen, 0-2 /HPF Final    Hyaline Casts, UA 04/26/2024 None Seen  None Seen /LPF Final    Methodology 04/26/2024 Automated Microscopy   Final    Extra Tube 04/26/2024 Hold for add-ons.   Final    Auto resulted.    Urine Culture 04/26/2024 >100,000 CFU/mL Escherichia coli (A)   Final        Procedure:   Cystoscopy:  Patient presents today for cystourethroscopy.  I went ahead and obtained an informed consent including the risks of anesthesia, bleeding, infection, etc.  After prepping and draping in a sterile fashion in the low dorsal lithotomy position, the urethra was gently anesthetized with 10 mL of 2% viscous Xylocaine jelly.  After an appropriate period of topical anesthesia, I used the Olympus digital 14 [place] flexible cystoscope to examine the anterior urethra which was completely normal.  The ureteral orifices were visualized and normal in position and configuration. There were no stones,  or foreign bodies.  There was a right right lateral submucosal bladder tumor.  She was given 80 mg of gentamicin as prophylaxis  Assessment/Plan:   Bladder tumor-with swollen right lower extremity consistent with metastatic disease I am to set her up for TURBT as soon as possible            This document has been electronically signed by JO ANN GOMEZ MD May 14, 2024 13:38 EDT    Dictated Utilizing Dragon Dictation: Part of this note may be an electronic transcription/translation of spoken language to  printed text using the Dragon Dictation System.

## 2024-05-14 NOTE — ED PROVIDER NOTES
Subjective   History of Present Illness  54-year-old female who presents to the ED today for right greater than left lower extremity swelling.  She states this started a couple months ago.  She states she was initially seen at [facility] and they told her that she had lymphedema.  She states her symptoms have progressively gotten worse.  She states now she feels like the muscles are drawing up in her legs and she is having pain.  She denies any fever.  She denies any urinary symptoms.  She denies any hematuria.  She denies any chest pain or difficulty breathing.  She denies any abdominal pain.  She is a 2 pack-a-day smoker and has been a smoker for the majority of her life.    History provided by:  Patient  Leg Swelling  Severity:  Moderate  Onset quality:  Gradual  Duration: several months.  Timing:  Constant  Progression:  Worsening  Chronicity:  New  Associated symptoms: myalgias    Associated symptoms: no abdominal pain, no chest pain, no fatigue, no fever, no nausea, no shortness of breath and no vomiting        Review of Systems   Constitutional:  Negative for fatigue and fever.   HENT: Negative.     Eyes: Negative.    Respiratory:  Negative for shortness of breath.    Cardiovascular:  Positive for leg swelling. Negative for chest pain.   Gastrointestinal: Negative.  Negative for abdominal pain, nausea and vomiting.   Genitourinary: Negative.    Musculoskeletal:  Positive for myalgias.   Skin: Negative.    Neurological: Negative.    Psychiatric/Behavioral: Negative.     All other systems reviewed and are negative.      Past Medical History:   Diagnosis Date    Hypertension     Psychosis     Seizures     Substance abuse        Allergies   Allergen Reactions    Codeine Itching and GI Intolerance       Past Surgical History:   Procedure Laterality Date    ANKLE SURGERY      APPENDECTOMY      TONSILLECTOMY         No family history on file.    Social History     Socioeconomic History    Marital status:     Tobacco Use    Smoking status: Every Day     Current packs/day: 1.00     Types: Cigarettes    Smokeless tobacco: Never   Vaping Use    Vaping status: Never Used   Substance and Sexual Activity    Alcohol use: Not Currently     Comment: denies at this time.    Drug use: Defer     Types: Other     Comment: DENIES drug use but on suboxone    Sexual activity: Yes     Partners: Male           Objective   Physical Exam  Vitals and nursing note reviewed.   Constitutional:       General: She is not in acute distress.     Appearance: Normal appearance. She is not diaphoretic.   HENT:      Head: Normocephalic and atraumatic.      Right Ear: External ear normal.      Left Ear: External ear normal.      Nose: Nose normal.   Eyes:      Conjunctiva/sclera: Conjunctivae normal.      Pupils: Pupils are equal, round, and reactive to light.   Cardiovascular:      Rate and Rhythm: Regular rhythm. Tachycardia present.      Pulses: Normal pulses.      Heart sounds: Normal heart sounds.   Pulmonary:      Effort: Pulmonary effort is normal.      Breath sounds: Normal breath sounds.   Abdominal:      General: Bowel sounds are normal.      Palpations: Abdomen is soft.      Tenderness: There is no abdominal tenderness. There is no right CVA tenderness, left CVA tenderness, guarding or rebound.   Musculoskeletal:      Cervical back: Normal range of motion and neck supple.      Comments: Patient noted to have a right greater then left swelling of the lower extremity.  The swelling on the right leg does go from her hip all the way to her foot.  She has a red discoloration to the right leg.  She has some mild swelling noted to the left lower leg with no discoloration.  Cap refill is normal and distal pulses are intact.  Range of motion is within normal limits.  She is able to ambulate with no difficulty.  Sensation is intact.   Skin:     General: Skin is warm and dry.      Capillary Refill: Capillary refill takes less than 2 seconds.    Neurological:      General: No focal deficit present.      Mental Status: She is alert and oriented to person, place, and time.   Psychiatric:         Mood and Affect: Mood normal.         Procedures       Results for orders placed or performed during the hospital encounter of 05/14/24   Comprehensive Metabolic Panel    Specimen: Arm, Left; Blood   Result Value Ref Range    Glucose 119 (H) 65 - 99 mg/dL    BUN 5 (L) 6 - 20 mg/dL    Creatinine 0.84 0.57 - 1.00 mg/dL    Sodium 139 136 - 145 mmol/L    Potassium 3.5 3.5 - 5.2 mmol/L    Chloride 99 98 - 107 mmol/L    CO2 28.1 22.0 - 29.0 mmol/L    Calcium 9.5 8.6 - 10.5 mg/dL    Total Protein 8.3 6.0 - 8.5 g/dL    Albumin 4.1 3.5 - 5.2 g/dL    ALT (SGPT) 12 1 - 33 U/L    AST (SGOT) 18 1 - 32 U/L    Alkaline Phosphatase 99 39 - 117 U/L    Total Bilirubin 0.2 0.0 - 1.2 mg/dL    Globulin 4.2 gm/dL    A/G Ratio 1.0 g/dL    BUN/Creatinine Ratio 6.0 (L) 7.0 - 25.0    Anion Gap 11.9 5.0 - 15.0 mmol/L    eGFR 83.2 >60.0 mL/min/1.73   Protime-INR    Specimen: Arm, Left; Blood   Result Value Ref Range    Protime 12.2 12.1 - 14.7 Seconds    INR 0.89 (L) 0.90 - 1.10   aPTT    Specimen: Arm, Left; Blood   Result Value Ref Range    PTT 28.6 26.5 - 34.5 seconds   hCG, Serum, Qualitative    Specimen: Arm, Left; Blood   Result Value Ref Range    HCG Qualitative Negative Negative   BNP    Specimen: Arm, Left; Blood   Result Value Ref Range    proBNP 354.8 0.0 - 900.0 pg/mL   Lactic Acid, Plasma    Specimen: Arm, Left; Blood   Result Value Ref Range    Lactate 1.6 0.5 - 2.0 mmol/L   Procalcitonin    Specimen: Arm, Left; Blood   Result Value Ref Range    Procalcitonin 0.14 0.00 - 0.25 ng/mL   Sedimentation Rate    Specimen: Arm, Left; Blood   Result Value Ref Range    Sed Rate 45 (H) 0 - 30 mm/hr   C-reactive Protein    Specimen: Arm, Left; Blood   Result Value Ref Range    C-Reactive Protein 0.42 0.00 - 0.50 mg/dL   CK    Specimen: Arm, Left; Blood   Result Value Ref Range    Creatine  Kinase 81 20 - 180 U/L   Magnesium    Specimen: Arm, Left; Blood   Result Value Ref Range    Magnesium 1.8 1.6 - 2.6 mg/dL   TSH    Specimen: Arm, Left; Blood   Result Value Ref Range    TSH 1.160 0.270 - 4.200 uIU/mL   T4, Free    Specimen: Arm, Left; Blood   Result Value Ref Range    Free T4 1.17 0.93 - 1.70 ng/dL   CBC Auto Differential    Specimen: Arm, Left; Blood   Result Value Ref Range    WBC 10.61 3.40 - 10.80 10*3/mm3    RBC 4.33 3.77 - 5.28 10*6/mm3    Hemoglobin 13.2 12.0 - 15.9 g/dL    Hematocrit 40.6 34.0 - 46.6 %    MCV 93.8 79.0 - 97.0 fL    MCH 30.5 26.6 - 33.0 pg    MCHC 32.5 31.5 - 35.7 g/dL    RDW 13.1 12.3 - 15.4 %    RDW-SD 44.9 37.0 - 54.0 fl    MPV 9.8 6.0 - 12.0 fL    Platelets 339 140 - 450 10*3/mm3    Neutrophil % 65.8 42.7 - 76.0 %    Lymphocyte % 19.1 (L) 19.6 - 45.3 %    Monocyte % 10.0 5.0 - 12.0 %    Eosinophil % 3.9 0.3 - 6.2 %    Basophil % 0.9 0.0 - 1.5 %    Immature Grans % 0.3 0.0 - 0.5 %    Neutrophils, Absolute 6.98 1.70 - 7.00 10*3/mm3    Lymphocytes, Absolute 2.03 0.70 - 3.10 10*3/mm3    Monocytes, Absolute 1.06 (H) 0.10 - 0.90 10*3/mm3    Eosinophils, Absolute 0.41 (H) 0.00 - 0.40 10*3/mm3    Basophils, Absolute 0.10 0.00 - 0.20 10*3/mm3    Immature Grans, Absolute 0.03 0.00 - 0.05 10*3/mm3    nRBC 0.0 0.0 - 0.2 /100 WBC   Green Top (Gel)   Result Value Ref Range    Extra Tube Hold for add-ons.    Lavender Top   Result Value Ref Range    Extra Tube hold for add-on    Light Blue Top   Result Value Ref Range    Extra Tube Hold for add-ons.           ED Course  ED Course as of 05/14/24 1319   Tue May 14, 2024   1130 US Arterial Doppler Lower Extremity Bilateral  FINDINGS:    Right common femoral artery:  Peak systolic velocity in the right  common femoral artery is 126 cm/s.  Monophasic waveform.    Right superficial femoral artery:  Peak systolic velocity in the right  superficial femoral artery is 113 cm/s.  Monophasic waveform.    Right popliteal artery:  Peak systolic  velocity in the right popliteal  artery is 74 cm/s.  Monophasic waveform.    Right calf/foot arteries:  Peak systolic velocity in the right  posterior tibial artery is 89 cm/s.  Monophasic waveform.       Left common femoral artery:  Peak systolic velocity in the left common  femoral artery is 83 cm/s.  Monophasic waveform.    Left superficial femoral artery:  Peak systolic velocity in the left  superficial femoral artery is 87 cm/s.  Monophasic waveform.    Left popliteal artery:  Peak systolic velocity in the left popliteal  artery is 31 cm/s.  Monophasic waveform.    Left calf/foot arteries:  Peak systolic velocity in the left posterior  tibial artery is 38 cm/s.  Monophasic waveform.       Soft tissues:  Unremarkable as visualized.     IMPRESSION:  1. No segments of significant stenosis or occlusion.  2. Monophasic waveforms indicative of diffuse arterial inflow disease.   []   1131 US Venous Doppler Lower Extremity Bilateral (duplex)  FINDINGS:    Right deep veins:  Unremarkable as visualized.  No DVT.       Left deep veins:  See above.       Soft tissues:  Soft tissue edema of the right greater than left lower  extremities.  Distal right SFV poorly visualized due to the extent of  edema in the soft tissues.  No popliteal cyst.    Lymph nodes:  Reactive appearing right inguinal lymph node is noted.     IMPRESSION:  1.  No DVT.  2.  Soft tissue edema of the right greater than left lower extremities.  3.  Reactive appearing right inguinal lymph node is noted.   []   1140 Case discussed with Dr. Montoya []   1310 Spoke with Dr. Alves - he will see the patient now in his clinic for cystoscopy []      ED Course User Index  [] Margaret Aquino, PA                                             Medical Decision Making  54-year-old female presents to the ED today for right greater than left lower extremity swelling.  This has been going on for a couple months and seems to be getting worse.  The patient was initially  told that she had lymphedema but was not sure what the cause was.  She states she has not followed up with anyone else since being seen at [facility].  No DVT on ultrasound.  Patient did have monophasic waveforms on arterial Doppler but no significant stenosis or occlusion.  Therefore a angio CT scan with runoff was performed and this shows a mass in the patient's pelvis that appears to be coming off of the bladder that is causing right-sided hydronephrosis.  This is most likely the cause of the patient's lower extremity edema.  I did discuss this patient with Dr. Alves with urology.  He will see the patient immediately in his clinic.  She was discharged from the ED with instructions to go directly to his clinic.    Problems Addressed:  Bladder mass: complicated acute illness or injury  Hydronephrosis with ureteropelvic junction (UPJ) obstruction: complicated acute illness or injury  Right leg swelling: complicated acute illness or injury    Amount and/or Complexity of Data Reviewed  Labs: ordered.  Radiology: ordered. Decision-making details documented in ED Course.    Risk  Prescription drug management.        Final diagnoses:   Bladder mass   Hydronephrosis with ureteropelvic junction (UPJ) obstruction   Right leg swelling       ED Disposition  ED Disposition       ED Disposition   Discharge    Condition   Stable    Comment   --               Ron Alves MD  [address]  [place] [id number] [phone]    Go today           Medication List      No changes were made to your prescriptions during this visit.            Margaret Aquino PA  05/14/24 [id number]

## 2024-05-15 DIAGNOSIS — N32.89 BLADDER MASS: Primary | ICD-10-CM

## 2024-05-19 LAB
BACTERIA SPEC AEROBE CULT: NORMAL
BACTERIA SPEC AEROBE CULT: NORMAL

## 2024-05-20 ENCOUNTER — HOSPITAL ENCOUNTER (OUTPATIENT)
Facility: HOSPITAL | Age: 54
Setting detail: HOSPITAL OUTPATIENT SURGERY
Discharge: HOME | End: 2024-05-20
Attending: UROLOGY | Admitting: UROLOGY
Payer: COMMERCIAL

## 2024-05-20 ENCOUNTER — ANESTHESIA EVENT (OUTPATIENT)
Dept: PERIOP | Facility: HOSPITAL | Age: 54
End: 2024-05-20
Payer: COMMERCIAL

## 2024-05-20 ENCOUNTER — ANESTHESIA (OUTPATIENT)
Dept: PERIOP | Facility: HOSPITAL | Age: 54
End: 2024-05-20
Payer: COMMERCIAL

## 2024-05-20 ENCOUNTER — APPOINTMENT (OUTPATIENT)
Dept: GENERAL RADIOLOGY | Facility: HOSPITAL | Age: 54
End: 2024-05-20
Payer: COMMERCIAL

## 2024-05-20 VITALS
TEMPERATURE: 98.4 F | OXYGEN SATURATION: 98 % | DIASTOLIC BLOOD PRESSURE: 79 MMHG | HEIGHT: 63 IN | SYSTOLIC BLOOD PRESSURE: 155 MMHG | HEART RATE: 81 BPM | RESPIRATION RATE: 18 BRPM | WEIGHT: 157 LBS | BODY MASS INDEX: 27.82 KG/M2

## 2024-05-20 DIAGNOSIS — N32.89 BLADDER MASS: ICD-10-CM

## 2024-05-20 LAB
B-HCG UR QL: NEGATIVE
EXPIRATION DATE: NORMAL
INTERNAL NEGATIVE CONTROL: NORMAL
INTERNAL POSITIVE CONTROL: NORMAL
Lab: NORMAL

## 2024-05-20 PROCEDURE — 25010000002 ONDANSETRON PER 1 MG: Performed by: NURSE ANESTHETIST, CERTIFIED REGISTERED

## 2024-05-20 PROCEDURE — 25010000002 MIDAZOLAM PER 1 MG: Performed by: NURSE ANESTHETIST, CERTIFIED REGISTERED

## 2024-05-20 PROCEDURE — 25010000002 KETOROLAC TROMETHAMINE PER 15 MG: Performed by: NURSE ANESTHETIST, CERTIFIED REGISTERED

## 2024-05-20 PROCEDURE — 25010000002 PROPOFOL 200 MG/20ML EMULSION: Performed by: NURSE ANESTHETIST, CERTIFIED REGISTERED

## 2024-05-20 PROCEDURE — 81025 URINE PREGNANCY TEST: CPT | Performed by: ANESTHESIOLOGY

## 2024-05-20 PROCEDURE — 25010000002 FENTANYL CITRATE (PF) 50 MCG/ML SOLUTION: Performed by: NURSE ANESTHETIST, CERTIFIED REGISTERED

## 2024-05-20 PROCEDURE — 25810000003 LACTATED RINGERS PER 1000 ML: Performed by: ANESTHESIOLOGY

## 2024-05-20 PROCEDURE — 25010000002 GENTAMICIN PER 80 MG: Performed by: UROLOGY

## 2024-05-20 RX ORDER — KETOROLAC TROMETHAMINE 30 MG/ML
INJECTION, SOLUTION INTRAMUSCULAR; INTRAVENOUS AS NEEDED
Status: DISCONTINUED | OUTPATIENT
Start: 2024-05-20 | End: 2024-05-20 | Stop reason: SURG

## 2024-05-20 RX ORDER — FAMOTIDINE 10 MG/ML
INJECTION, SOLUTION INTRAVENOUS AS NEEDED
Status: DISCONTINUED | OUTPATIENT
Start: 2024-05-20 | End: 2024-05-20 | Stop reason: SURG

## 2024-05-20 RX ORDER — SODIUM CHLORIDE 0.9 % (FLUSH) 0.9 %
10 SYRINGE (ML) INJECTION AS NEEDED
Status: DISCONTINUED | OUTPATIENT
Start: 2024-05-20 | End: 2024-05-20 | Stop reason: HOSPADM

## 2024-05-20 RX ORDER — FENTANYL CITRATE 50 UG/ML
50 INJECTION, SOLUTION INTRAMUSCULAR; INTRAVENOUS
Status: DISCONTINUED | OUTPATIENT
Start: 2024-05-20 | End: 2024-05-20 | Stop reason: HOSPADM

## 2024-05-20 RX ORDER — FENTANYL CITRATE 50 UG/ML
INJECTION, SOLUTION INTRAMUSCULAR; INTRAVENOUS AS NEEDED
Status: DISCONTINUED | OUTPATIENT
Start: 2024-05-20 | End: 2024-05-20 | Stop reason: SURG

## 2024-05-20 RX ORDER — SODIUM CHLORIDE 0.9 % (FLUSH) 0.9 %
10 SYRINGE (ML) INJECTION EVERY 12 HOURS SCHEDULED
Status: DISCONTINUED | OUTPATIENT
Start: 2024-05-20 | End: 2024-05-20 | Stop reason: HOSPADM

## 2024-05-20 RX ORDER — SODIUM CHLORIDE, SODIUM LACTATE, POTASSIUM CHLORIDE, CALCIUM CHLORIDE 600; 310; 30; 20 MG/100ML; MG/100ML; MG/100ML; MG/100ML
100 INJECTION, SOLUTION INTRAVENOUS ONCE AS NEEDED
Status: DISCONTINUED | OUTPATIENT
Start: 2024-05-20 | End: 2024-05-20 | Stop reason: HOSPADM

## 2024-05-20 RX ORDER — OXYCODONE AND ACETAMINOPHEN 10; 325 MG/1; MG/1
1 TABLET ORAL EVERY 6 HOURS PRN
Qty: 10 TABLET | Refills: 0 | Status: SHIPPED | OUTPATIENT
Start: 2024-05-20

## 2024-05-20 RX ORDER — OXYCODONE HYDROCHLORIDE AND ACETAMINOPHEN 5; 325 MG/1; MG/1
1 TABLET ORAL ONCE AS NEEDED
Status: DISCONTINUED | OUTPATIENT
Start: 2024-05-20 | End: 2024-05-20 | Stop reason: HOSPADM

## 2024-05-20 RX ORDER — MAGNESIUM HYDROXIDE 1200 MG/15ML
LIQUID ORAL AS NEEDED
Status: DISCONTINUED | OUTPATIENT
Start: 2024-05-20 | End: 2024-05-20 | Stop reason: HOSPADM

## 2024-05-20 RX ORDER — ONDANSETRON 2 MG/ML
4 INJECTION INTRAMUSCULAR; INTRAVENOUS AS NEEDED
Status: DISCONTINUED | OUTPATIENT
Start: 2024-05-20 | End: 2024-05-20 | Stop reason: HOSPADM

## 2024-05-20 RX ORDER — IPRATROPIUM BROMIDE AND ALBUTEROL SULFATE 2.5; .5 MG/3ML; MG/3ML
3 SOLUTION RESPIRATORY (INHALATION) ONCE AS NEEDED
Status: DISCONTINUED | OUTPATIENT
Start: 2024-05-20 | End: 2024-05-20 | Stop reason: HOSPADM

## 2024-05-20 RX ORDER — PROPOFOL 10 MG/ML
INJECTION, EMULSION INTRAVENOUS AS NEEDED
Status: DISCONTINUED | OUTPATIENT
Start: 2024-05-20 | End: 2024-05-20 | Stop reason: SURG

## 2024-05-20 RX ORDER — ONDANSETRON 2 MG/ML
INJECTION INTRAMUSCULAR; INTRAVENOUS AS NEEDED
Status: DISCONTINUED | OUTPATIENT
Start: 2024-05-20 | End: 2024-05-20 | Stop reason: SURG

## 2024-05-20 RX ORDER — GENTAMICIN SULFATE 80 MG/100ML
80 INJECTION, SOLUTION INTRAVENOUS ONCE
Status: COMPLETED | OUTPATIENT
Start: 2024-05-20 | End: 2024-05-20

## 2024-05-20 RX ORDER — MIDAZOLAM HYDROCHLORIDE 1 MG/ML
INJECTION INTRAMUSCULAR; INTRAVENOUS AS NEEDED
Status: DISCONTINUED | OUTPATIENT
Start: 2024-05-20 | End: 2024-05-20 | Stop reason: SURG

## 2024-05-20 RX ORDER — LIDOCAINE HYDROCHLORIDE 20 MG/ML
JELLY TOPICAL AS NEEDED
Status: DISCONTINUED | OUTPATIENT
Start: 2024-05-20 | End: 2024-05-20 | Stop reason: HOSPADM

## 2024-05-20 RX ORDER — MEPERIDINE HYDROCHLORIDE 25 MG/ML
12.5 INJECTION INTRAMUSCULAR; INTRAVENOUS; SUBCUTANEOUS
Status: DISCONTINUED | OUTPATIENT
Start: 2024-05-20 | End: 2024-05-20 | Stop reason: HOSPADM

## 2024-05-20 RX ORDER — SODIUM CHLORIDE, SODIUM LACTATE, POTASSIUM CHLORIDE, CALCIUM CHLORIDE 600; 310; 30; 20 MG/100ML; MG/100ML; MG/100ML; MG/100ML
125 INJECTION, SOLUTION INTRAVENOUS ONCE
Status: COMPLETED | OUTPATIENT
Start: 2024-05-20 | End: 2024-05-20

## 2024-05-20 RX ORDER — MIDAZOLAM HYDROCHLORIDE 1 MG/ML
1 INJECTION INTRAMUSCULAR; INTRAVENOUS
Status: DISCONTINUED | OUTPATIENT
Start: 2024-05-20 | End: 2024-05-20 | Stop reason: HOSPADM

## 2024-05-20 RX ORDER — SODIUM CHLORIDE 9 MG/ML
40 INJECTION, SOLUTION INTRAVENOUS AS NEEDED
Status: DISCONTINUED | OUTPATIENT
Start: 2024-05-20 | End: 2024-05-20 | Stop reason: HOSPADM

## 2024-05-20 RX ADMIN — GENTAMICIN SULFATE 80 MG: 80 INJECTION, SOLUTION INTRAVENOUS at 09:38

## 2024-05-20 RX ADMIN — ONDANSETRON 4 MG: 2 INJECTION INTRAMUSCULAR; INTRAVENOUS at 09:38

## 2024-05-20 RX ADMIN — MIDAZOLAM HYDROCHLORIDE 2 MG: 1 INJECTION, SOLUTION INTRAMUSCULAR; INTRAVENOUS at 09:38

## 2024-05-20 RX ADMIN — SODIUM CHLORIDE, POTASSIUM CHLORIDE, SODIUM LACTATE AND CALCIUM CHLORIDE: 600; 310; 30; 20 INJECTION, SOLUTION INTRAVENOUS at 09:38

## 2024-05-20 RX ADMIN — FENTANYL CITRATE 50 MCG: 50 INJECTION INTRAMUSCULAR; INTRAVENOUS at 09:53

## 2024-05-20 RX ADMIN — FAMOTIDINE 20 MG: 10 INJECTION, SOLUTION INTRAVENOUS at 09:38

## 2024-05-20 RX ADMIN — KETOROLAC TROMETHAMINE 30 MG: 30 INJECTION, SOLUTION INTRAMUSCULAR; INTRAVENOUS at 09:44

## 2024-05-20 RX ADMIN — FENTANYL CITRATE 50 MCG: 50 INJECTION INTRAMUSCULAR; INTRAVENOUS at 09:38

## 2024-05-20 RX ADMIN — PROPOFOL 150 MG: 10 INJECTION, EMULSION INTRAVENOUS at 09:42

## 2024-05-20 NOTE — ANESTHESIA POSTPROCEDURE EVALUATION
Patient: Anna Moore    Procedure Summary       Date: 05/20/24 Room / Location:  [facility] OR 06 /  [facility] OR    Anesthesia Start: 0938 Anesthesia Stop: 1004    Procedure: CYSTOSCOPY TRANSURETHRAL RESECTION OF BLADDER TUMOR Diagnosis:       Bladder mass      (Bladder mass [N32.89])    Surgeons: Ron Alves MD Provider: Bi Mcgrath DO    Anesthesia Type: general ASA Status: 2            Anesthesia Type: general    Vitals  Vitals Value Taken Time   /84 05/20/24 1037   Temp 98.4 °F (36.9 °C) 05/20/24 1037   Pulse 89 05/20/24 1037   Resp 18 05/20/24 1037   SpO2 98 % 05/20/24 1037           Post Anesthesia Care and Evaluation    Patient location during evaluation: PHASE II  Patient participation: complete - patient participated  Level of consciousness: awake and alert  Pain score: 1  Pain management: adequate    Airway patency: patent  Anesthetic complications: No anesthetic complications  PONV Status: controlled  Cardiovascular status: acceptable  Respiratory status: acceptable  Hydration status: euvolemic  No anesthesia care post op

## 2024-05-20 NOTE — ANESTHESIA PREPROCEDURE EVALUATION
Anesthesia Evaluation     Patient summary reviewed and Nursing notes reviewed   no history of anesthetic complications:   NPO Solid Status: > 8 hours  NPO Liquid Status: > 8 hours           Airway   Mallampati: II  TM distance: >3 FB  Neck ROM: full  No difficulty expected  Dental - normal exam     Pulmonary - normal exam    breath sounds clear to auscultation  (+) a smoker Current, Smoked day of surgery, cigarettes, COPD,  Cardiovascular - normal exam    Rhythm: regular  Rate: normal    (+) hypertension      Neuro/Psych  (+) seizures, psychiatric history  GI/Hepatic/Renal/Endo - negative ROS     Musculoskeletal     Abdominal  - normal exam   Substance History - negative use     OB/GYN negative ob/gyn ROS         Other   arthritis,   history of cancer                Anesthesia Plan    ASA 2     general     intravenous induction     Anesthetic plan, risks, benefits, and alternatives have been provided, discussed and informed consent has been obtained with: patient.  Pre-procedure education provided  Plan discussed with CRNA.    CODE STATUS:

## 2024-05-20 NOTE — ANESTHESIA PROCEDURE NOTES
Airway  Urgency: elective    Date/Time: 5/20/2024 9:43 AM  Airway not difficult    General Information and Staff    Patient location during procedure: OR  CRNA/CAA: Chelsea Jaimes CRNA    Indications and Patient Condition  Indications for airway management: airway protection    Preoxygenated: yes  MILS maintained throughout  Mask difficulty assessment: 0 - not attempted    Final Airway Details  Final airway type: supraglottic airway      Successful airway: unique  Size 4     Number of attempts at approach: 1  Assessment: lips, teeth, and gum same as pre-op

## 2024-05-20 NOTE — OP NOTE
CYSTOSCOPY TRANSURETHRAL RESECTION OF BLADDER TUMOR  Procedure Note    Anna Moore  5/20/2024    Pre-op Diagnosis:   Bladder mass [N32.89]    Post-op Diagnosis:     Post-Op Diagnosis Codes:     * Bladder mass [N32.89]    Procedure/CPT® Codes:  54-year-old white female referred emergently for the emergency room with a right swollen leg and a questionable mass she does not have a history of hematuria she is a heavy smoker.  Urgent cystoscopy in the office revealed right submucosal tumor just lateral to the orifice.  On the right she number Zentz for a definitive TURBT for diagnosis and treatment following an informed consent brought the procedure suite prepped and draped in a low dorsolithotomy position I used the Olympus 24 Romanian bipolar resectoscope identified normal orifice.  Bladder had a submucosal tumor about 2 x 3 cm which I resected clearly was tumor is well above the orifice I got excellent fulguration and hemostasis placed a catheter to gravity drainage the aggregate volume of my biopsy and TURBT was 2.5 cm.  The procedure was tolerated well bimanual examination showed obvious significant right lateral wall tumor with fixation of the sidewall most consistent with cervical cancer    Procedure(s):  CYSTOSCOPY TRANSURETHRAL RESECTION OF BLADDER TUMOR    Surgeon(s):  Ron Alves MD    Anesthesia: see anesthesia record    Staff:   Circulator: Lisbeth Underwood RN  Scrub Person: Nancy Reich LPN; Sarina Martinez    Estimated Blood Loss: none  Urine Voided: * No values recorded between 5/20/2024  9:39 AM and 5/20/2024 10:03 AM *    Specimens:                ID Type Source Tests Collected by Time   A : bladder tumor biopsy Tissue Urinary Bladder TISSUE EXAM, [facility] ([facility]) Ron Alves MD 5/20/2024 0947         Drains: 20 Romanian Gillespie catheter    Findings: Right submucosal tumor consistent with cervical cancer    Blood: N/A    Complications: None none    Grafts and  Implants: None    Ron Alves MD     Date: 5/20/2024  Time: 10:07 EDT

## 2024-05-21 ENCOUNTER — OFFICE VISIT (OUTPATIENT)
Dept: UROLOGY | Facility: CLINIC | Age: 54
End: 2024-05-21
Payer: COMMERCIAL

## 2024-05-21 VITALS
WEIGHT: 158.8 LBS | HEART RATE: 93 BPM | SYSTOLIC BLOOD PRESSURE: 173 MMHG | BODY MASS INDEX: 28.14 KG/M2 | HEIGHT: 63 IN | DIASTOLIC BLOOD PRESSURE: 92 MMHG

## 2024-05-21 DIAGNOSIS — C53.9 CERVICAL CANCER, FIGO STAGE IIIB: Primary | ICD-10-CM

## 2024-05-21 PROCEDURE — 99213 OFFICE O/P EST LOW 20 MIN: CPT | Performed by: UROLOGY

## 2024-05-21 PROCEDURE — 1159F MED LIST DOCD IN RCRD: CPT | Performed by: UROLOGY

## 2024-05-21 PROCEDURE — 1160F RVW MEDS BY RX/DR IN RCRD: CPT | Performed by: UROLOGY

## 2024-05-21 NOTE — PROGRESS NOTES
Chief Complaint:      Chief Complaint   Patient presents with    Bladder mass       HPI:   54 y.o. female presented with a swollen right leg.  TURBT showed a tumor over the right orifice.  It was resected came back cervical cancer I will see him back next week she will probably need a combination of radiation and chemotherapy I will make a referral immediately when she gets back.    Past Medical History:     Past Medical History:   Diagnosis Date    Arthritis     Cancer 2024    bladder    COPD (chronic obstructive pulmonary disease)     Hypertension     Psychosis     Seizures     last seizure x2 year ago    Substance abuse        Current Meds:     Current Outpatient Medications   Medication Sig Dispense Refill    buprenorphine-naloxone (SUBOXONE) 8-2 MG per SL tablet Place 2 tablets under the tongue Daily. Indications: Opioid Use Disorder (Continuation of Therapy) 6 tablet 0    FLUoxetine (PROzac) 40 MG capsule Take 1 capsule by mouth Daily. Indications: Depression 30 capsule 0    gabapentin (NEURONTIN) 400 MG capsule Take 1 capsule by mouth 2 (Two) Times a Day.      OLANZapine (zyPREXA) 20 MG tablet Take 1 tablet by mouth Every Night. Indications: psychosis 30 tablet 0    oxyCODONE-acetaminophen (Percocet)  MG per tablet Take 1 tablet by mouth Every 6 (Six) Hours As Needed for Moderate Pain (Pain). 10 tablet 0    prazosin (MINIPRESS) 2 MG capsule Take 1 capsule by mouth Every Night. Indications: Frightening Dreams 30 capsule 0    QUEtiapine (SEROquel) 200 MG tablet Take 1 tablet by mouth Every Night. Indications: Major Depressive Disorder 30 tablet 0    traZODone (DESYREL) 100 MG tablet Take 1 tablet by mouth At Night As Needed for Sleep. Indications: Trouble Sleeping 30 tablet 0     No current facility-administered medications for this visit.        Allergies:      Allergies   Allergen Reactions    Codeine Itching and GI Intolerance        Past Surgical History:     Past Surgical History:   Procedure  Laterality Date    ANKLE SURGERY      APPENDECTOMY      CLAVICLE SURGERY Right     TONSILLECTOMY         Social History:     Social History     Socioeconomic History    Marital status:    Tobacco Use    Smoking status: Every Day     Current packs/day: 1.00     Types: Cigarettes    Smokeless tobacco: Never   Vaping Use    Vaping status: Never Used   Substance and Sexual Activity    Alcohol use: Not Currently     Comment: denies at this time.    Drug use: Defer     Types: Other     Comment: DENIES drug use but on suboxone    Sexual activity: Defer     Partners: Male       Family History:     Family History   Problem Relation Age of Onset    Cancer Mother     Heart disease Father        Review of Systems:     Review of Systems   Constitutional: Negative.  Negative for activity change, appetite change, chills, diaphoresis, fatigue and unexpected weight change.   HENT:  Negative for congestion, dental problem, drooling, ear discharge, ear pain, facial swelling, hearing loss, mouth sores, nosebleeds, postnasal drip, rhinorrhea, sinus pressure, sneezing, sore throat, tinnitus, trouble swallowing and voice change.    Eyes: Negative.  Negative for photophobia, pain, discharge, redness, itching and visual disturbance.   Respiratory: Negative.  Negative for apnea, cough, choking, chest tightness, shortness of breath, wheezing and stridor.    Cardiovascular: Negative.  Negative for chest pain, palpitations and leg swelling.   Gastrointestinal: Negative.  Negative for abdominal distention, abdominal pain, anal bleeding, blood in stool, constipation, diarrhea, nausea, rectal pain and vomiting.   Endocrine: Negative.  Negative for cold intolerance, heat intolerance, polydipsia, polyphagia and polyuria.   Musculoskeletal: Negative.  Negative for arthralgias, back pain, gait problem, joint swelling, myalgias, neck pain and neck stiffness.   Skin: Negative.  Negative for color change, pallor, rash and wound.    Allergic/Immunologic: Negative.  Negative for environmental allergies, food allergies and immunocompromised state.   Neurological: Negative.  Negative for dizziness, tremors, seizures, syncope, facial asymmetry, speech difficulty, weakness, light-headedness, numbness and headaches.   Hematological: Negative.  Negative for adenopathy. Does not bruise/bleed easily.   Psychiatric/Behavioral:  Negative for agitation, behavioral problems, confusion, decreased concentration, dysphoric mood, hallucinations, self-injury, sleep disturbance and suicidal ideas. The patient is not nervous/anxious and is not hyperactive.    All other systems reviewed and are negative.      Physical Exam:     Physical Exam  Constitutional:       Appearance: She is well-developed.   HENT:      Head: Normocephalic and atraumatic.      Right Ear: External ear normal.      Left Ear: External ear normal.   Eyes:      Conjunctiva/sclera: Conjunctivae normal.      Pupils: Pupils are equal, round, and reactive to light.   Cardiovascular:      Rate and Rhythm: Normal rate and regular rhythm.      Heart sounds: Normal heart sounds.   Pulmonary:      Effort: Pulmonary effort is normal.      Breath sounds: Normal breath sounds.   Abdominal:      General: Bowel sounds are normal. There is no distension.      Palpations: Abdomen is soft. There is no mass.      Tenderness: There is no abdominal tenderness. There is no guarding or rebound.   Genitourinary:     Vagina: No vaginal discharge.   Musculoskeletal:         General: Normal range of motion.   Skin:     General: Skin is warm and dry.   Neurological:      Mental Status: She is alert.      Deep Tendon Reflexes: Reflexes are normal and symmetric.   Psychiatric:         Behavior: Behavior normal.         Thought Content: Thought content normal.         Judgment: Judgment normal.         I have reviewed the following portions of the patient's history: Allergies, current medications, past family history, past  medical history, past social history, past surgical history, problem list, and ROS and confirm it is accurate.    Recent Image (CT and/or KUB):      CT Abdomen and Pelvis: No results found for this or any previous visit.       CT Stone Protocol: No results found for this or any previous visit.       KUB: No results found for this or any previous visit.       Labs (past 3 months):      Admission on 05/20/2024, Discharged on 05/20/2024   Component Date Value Ref Range Status    HCG, Urine, QL 05/20/2024 Negative  Negative Final    Lot Number 05/20/2024 #[id number]   Final    Internal Positive Control 05/20/2024 Passed  Positive, Passed Final    Internal Negative Control 05/20/2024 Passed  Negative, Passed Final    Expiration Date 05/20/2024 04/11/2025   Final   Admission on 05/14/2024, Discharged on 05/14/2024   Component Date Value Ref Range Status    Glucose 05/14/2024 119 (H)  65 - 99 mg/dL Final    BUN 05/14/2024 5 (L)  6 - 20 mg/dL Final    Creatinine 05/14/2024 0.84  0.57 - 1.00 mg/dL Final    Sodium 05/14/2024 139  136 - 145 mmol/L Final    Potassium 05/14/2024 3.5  3.5 - 5.2 mmol/L Final    Chloride 05/14/2024 99  98 - 107 mmol/L Final    CO2 05/14/2024 28.1  22.0 - 29.0 mmol/L Final    Calcium 05/14/2024 9.5  8.6 - 10.5 mg/dL Final    Total Protein 05/14/2024 8.3  6.0 - 8.5 g/dL Final    Albumin 05/14/2024 4.1  3.5 - 5.2 g/dL Final    ALT (SGPT) 05/14/2024 12  1 - 33 U/L Final    AST (SGOT) 05/14/2024 18  1 - 32 U/L Final    Alkaline Phosphatase 05/14/2024 99  39 - 117 U/L Final    Total Bilirubin 05/14/2024 0.2  0.0 - 1.2 mg/dL Final    Globulin 05/14/2024 4.2  gm/dL Final    A/G Ratio 05/14/2024 1.0  g/dL Final    BUN/Creatinine Ratio 05/14/2024 6.0 (L)  7.0 - 25.0 Final    Anion Gap 05/14/2024 11.9  5.0 - 15.0 mmol/L Final    eGFR 05/14/2024 83.2  >60.0 mL/min/1.73 Final    Protime 05/14/2024 12.2  12.1 - 14.7 Seconds Final    INR 05/14/2024 0.89 (L)  0.90 - 1.10 Final    PTT 05/14/2024 28.6  26.5 - 34.5  seconds Final    HCG Qualitative 05/14/2024 Negative  Negative Final    proBNP 05/14/2024 354.8  0.0 - 900.0 pg/mL Final    Blood Culture 05/14/2024 No growth at 5 days   Final    Blood Culture 05/14/2024 No growth at 5 days   Final    Lactate 05/14/2024 1.6  0.5 - 2.0 mmol/L Final    Procalcitonin 05/14/2024 0.14  0.00 - 0.25 ng/mL Final    Sed Rate 05/14/2024 45 (H)  0 - 30 mm/hr Final    C-Reactive Protein 05/14/2024 0.42  0.00 - 0.50 mg/dL Final    Creatine Kinase 05/14/2024 81  20 - 180 U/L Final    Magnesium 05/14/2024 1.8  1.6 - 2.6 mg/dL Final    TSH 05/14/2024 1.160  0.270 - 4.200 uIU/mL Final    Free T4 05/14/2024 1.17  0.93 - 1.70 ng/dL Final    WBC 05/14/2024 10.61  3.40 - 10.80 10*3/mm3 Final    RBC 05/14/2024 4.33  3.77 - 5.28 10*6/mm3 Final    Hemoglobin 05/14/2024 13.2  12.0 - 15.9 g/dL Final    Hematocrit 05/14/2024 40.6  34.0 - 46.6 % Final    MCV 05/14/2024 93.8  79.0 - 97.0 fL Final    MCH 05/14/2024 30.5  26.6 - 33.0 pg Final    MCHC 05/14/2024 32.5  31.5 - 35.7 g/dL Final    RDW 05/14/2024 13.1  12.3 - 15.4 % Final    RDW-SD 05/14/2024 44.9  37.0 - 54.0 fl Final    MPV 05/14/2024 9.8  6.0 - 12.0 fL Final    Platelets 05/14/2024 339  140 - 450 10*3/mm3 Final    Neutrophil % 05/14/2024 65.8  42.7 - 76.0 % Final    Lymphocyte % 05/14/2024 19.1 (L)  19.6 - 45.3 % Final    Monocyte % 05/14/2024 10.0  5.0 - 12.0 % Final    Eosinophil % 05/14/2024 3.9  0.3 - 6.2 % Final    Basophil % 05/14/2024 0.9  0.0 - 1.5 % Final    Immature Grans % 05/14/2024 0.3  0.0 - 0.5 % Final    Neutrophils, Absolute 05/14/2024 6.98  1.70 - 7.00 10*3/mm3 Final    Lymphocytes, Absolute 05/14/2024 2.03  0.70 - 3.10 10*3/mm3 Final    Monocytes, Absolute 05/14/2024 1.06 (H)  0.10 - 0.90 10*3/mm3 Final    Eosinophils, Absolute 05/14/2024 0.41 (H)  0.00 - 0.40 10*3/mm3 Final    Basophils, Absolute 05/14/2024 0.10  0.00 - 0.20 10*3/mm3 Final    Immature Grans, Absolute 05/14/2024 0.03  0.00 - 0.05 10*3/mm3 Final    nRBC 05/14/2024  0.0  0.0 - 0.2 /100 WBC Final    Extra Tube 05/14/2024 Hold for add-ons.   Final    Auto resulted.    Extra Tube 05/14/2024 hold for add-on   Final    Auto resulted    Extra Tube 05/14/2024 Hold for add-ons.   Final    Auto resulted   Admission on 04/26/2024, Discharged on 04/26/2024   Component Date Value Ref Range Status    Glucose 04/26/2024 114 (H)  65 - 99 mg/dL Final    BUN 04/26/2024 4 (L)  6 - 20 mg/dL Final    Creatinine 04/26/2024 0.82  0.57 - 1.00 mg/dL Final    Sodium 04/26/2024 136  136 - 145 mmol/L Final    Potassium 04/26/2024 3.5  3.5 - 5.2 mmol/L Final    Chloride 04/26/2024 98  98 - 107 mmol/L Final    CO2 04/26/2024 24.2  22.0 - 29.0 mmol/L Final    Calcium 04/26/2024 9.4  8.6 - 10.5 mg/dL Final    Total Protein 04/26/2024 8.1  6.0 - 8.5 g/dL Final    Albumin 04/26/2024 4.1  3.5 - 5.2 g/dL Final    ALT (SGPT) 04/26/2024 13  1 - 33 U/L Final    AST (SGOT) 04/26/2024 17  1 - 32 U/L Final    Alkaline Phosphatase 04/26/2024 86  39 - 117 U/L Final    Total Bilirubin 04/26/2024 0.3  0.0 - 1.2 mg/dL Final    Globulin 04/26/2024 4.0  gm/dL Final    A/G Ratio 04/26/2024 1.0  g/dL Final    BUN/Creatinine Ratio 04/26/2024 4.9 (L)  7.0 - 25.0 Final    Anion Gap 04/26/2024 13.8  5.0 - 15.0 mmol/L Final    eGFR 04/26/2024 85.7  >60.0 mL/min/1.73 Final    Color, UA 04/26/2024 Yellow  Yellow, Straw Final    Appearance, UA 04/26/2024 Clear  Clear Final    pH, UA 04/26/2024 7.0  5.0 - 8.0 Final    Specific Gravity, UA 04/26/2024 1.008  1.005 - 1.030 Final    Glucose, UA 04/26/2024 Negative  Negative Final    Ketones, UA 04/26/2024 Negative  Negative Final    Bilirubin, UA 04/26/2024 Negative  Negative Final    Blood, UA 04/26/2024 Trace (A)  Negative Final    Protein, UA 04/26/2024 Negative  Negative Final    Leuk Esterase, UA 04/26/2024 Moderate (2+) (A)  Negative Final    Nitrite, UA 04/26/2024 Positive (A)  Negative Final    Urobilinogen, UA 04/26/2024 1.0 E.U./dL  0.2 - 1.0 E.U./dL Final    THC, Screen, Urine  04/26/2024 Negative  Negative Final    Phencyclidine (PCP), Urine 04/26/2024 Negative  Negative Final    Cocaine Screen, Urine 04/26/2024 Negative  Negative Final    Methamphetamine, Ur 04/26/2024 Negative  Negative Final    Opiate Screen 04/26/2024 Negative  Negative Final    Amphetamine Screen, Urine 04/26/2024 Negative  Negative Final    Benzodiazepine Screen, Urine 04/26/2024 Negative  Negative Final    Tricyclic Antidepressants Screen 04/26/2024 Negative  Negative Final    Methadone Screen, Urine 04/26/2024 Negative  Negative Final    Barbiturates Screen, Urine 04/26/2024 Negative  Negative Final    Oxycodone Screen, Urine 04/26/2024 Negative  Negative Final    Buprenorphine, Screen, Urine 04/26/2024 Positive (A)  Negative Final    Magnesium 04/26/2024 1.8  1.6 - 2.6 mg/dL Final    HCG, Urine QL 04/26/2024 Negative  Negative Final    Ethanol 04/26/2024 <10  0 - 10 mg/dL Final    Ethanol % 04/26/2024 <0.010  % Final    WBC 04/26/2024 10.99 (H)  3.40 - 10.80 10*3/mm3 Final    RBC 04/26/2024 4.15  3.77 - 5.28 10*6/mm3 Final    Hemoglobin 04/26/2024 12.8  12.0 - 15.9 g/dL Final    Hematocrit 04/26/2024 39.0  34.0 - 46.6 % Final    MCV 04/26/2024 94.0  79.0 - 97.0 fL Final    MCH 04/26/2024 30.8  26.6 - 33.0 pg Final    MCHC 04/26/2024 32.8  31.5 - 35.7 g/dL Final    RDW 04/26/2024 13.1  12.3 - 15.4 % Final    RDW-SD 04/26/2024 44.9  37.0 - 54.0 fl Final    MPV 04/26/2024 10.0  6.0 - 12.0 fL Final    Platelets 04/26/2024 287  140 - 450 10*3/mm3 Final    Neutrophil % 04/26/2024 63.2  42.7 - 76.0 % Final    Lymphocyte % 04/26/2024 24.2  19.6 - 45.3 % Final    Monocyte % 04/26/2024 9.8  5.0 - 12.0 % Final    Eosinophil % 04/26/2024 1.5  0.3 - 6.2 % Final    Basophil % 04/26/2024 1.0  0.0 - 1.5 % Final    Immature Grans % 04/26/2024 0.3  0.0 - 0.5 % Final    Neutrophils, Absolute 04/26/2024 6.94  1.70 - 7.00 10*3/mm3 Final    Lymphocytes, Absolute 04/26/2024 2.66  0.70 - 3.10 10*3/mm3 Final    Monocytes, Absolute  04/26/2024 1.08 (H)  0.10 - 0.90 10*3/mm3 Final    Eosinophils, Absolute 04/26/2024 0.17  0.00 - 0.40 10*3/mm3 Final    Basophils, Absolute 04/26/2024 0.11  0.00 - 0.20 10*3/mm3 Final    Immature Grans, Absolute 04/26/2024 0.03  0.00 - 0.05 10*3/mm3 Final    nRBC 04/26/2024 0.0  0.0 - 0.2 /100 WBC Final    Extra Tube 04/26/2024 Hold for add-ons.   Final    Auto resulted.    Extra Tube 04/26/2024 hold for add-on   Final    Auto resulted    Extra Tube 04/26/2024 Hold for add-ons.   Final    Auto resulted.    Extra Tube 04/26/2024 Hold for add-ons.   Final    Auto resulted    Fentanyl, Urine 04/26/2024 Negative  Negative Final    RBC, UA 04/26/2024 3-5 (A)  None Seen, 0-2 /HPF Final    WBC, UA 04/26/2024 21-50 (A)  None Seen, 0-2 /HPF Final    Bacteria, UA 04/26/2024 4+ (A)  None Seen /HPF Final    Squamous Epithelial Cells, UA 04/26/2024 0-2  None Seen, 0-2 /HPF Final    Hyaline Casts, UA 04/26/2024 None Seen  None Seen /LPF Final    Methodology 04/26/2024 Automated Microscopy   Final    Extra Tube 04/26/2024 Hold for add-ons.   Final    Auto resulted.    Urine Culture 04/26/2024 >100,000 CFU/mL Escherichia coli (A)   Final        Procedure:       Assessment/Plan:   Metastatic cervical cancer invading the base of the bladder.  Going to recommend combination of radiation and chemotherapy I will make the referral ASAP            This document has been electronically signed by JO ANN GOMEZ MD May 21, 2024 09:15 EDT    Dictated Utilizing Dragon Dictation: Part of this note may be an electronic transcription/translation of spoken language to printed text using the Dragon Dictation System.

## 2024-05-22 ENCOUNTER — TELEPHONE (OUTPATIENT)
Dept: UROLOGY | Facility: CLINIC | Age: 54
End: 2024-05-22
Payer: COMMERCIAL

## 2024-05-22 LAB — REF LAB TEST METHOD: NORMAL

## 2024-05-22 NOTE — TELEPHONE ENCOUNTER
Caller: GISELA    Relationship: [facility]    Patient is needing:URINARY BLADDER BIOPSY - P16 POSITIVE SQUAMOUSCELL CARCINOMA

## 2024-05-27 PROBLEM — C53.9 CERVICAL CANCER, FIGO STAGE IIIB: Status: ACTIVE | Noted: 2024-05-27

## 2024-05-28 ENCOUNTER — OFFICE VISIT (OUTPATIENT)
Dept: UROLOGY | Facility: CLINIC | Age: 54
End: 2024-05-28
Payer: COMMERCIAL

## 2024-05-28 VITALS
HEART RATE: 112 BPM | HEIGHT: 63 IN | DIASTOLIC BLOOD PRESSURE: 89 MMHG | SYSTOLIC BLOOD PRESSURE: 131 MMHG | BODY MASS INDEX: 27.57 KG/M2 | WEIGHT: 155.6 LBS

## 2024-05-28 DIAGNOSIS — C53.9 CERVICAL CANCER, FIGO STAGE IIIB: Primary | ICD-10-CM

## 2024-05-28 PROCEDURE — 1159F MED LIST DOCD IN RCRD: CPT | Performed by: UROLOGY

## 2024-05-28 PROCEDURE — 1160F RVW MEDS BY RX/DR IN RCRD: CPT | Performed by: UROLOGY

## 2024-05-28 PROCEDURE — 99213 OFFICE O/P EST LOW 20 MIN: CPT | Performed by: UROLOGY

## 2024-05-28 NOTE — PROGRESS NOTES
Chief Complaint:      Chief Complaint   Patient presents with    Follow-up       HPI:   54 y.o. female who presented to the emergency room with a swollen right lower extremity found to have a questionable bladder mass I did a bladder biopsy noted unobscured trigone and it was positive for cervical cancer given the appearance of the right-sided hydronephrosis this is classic stage IIIb cervical cancer her contralateral extremity starting to swell secondary to lymphatic obstruction I made an urgent referral to radiation and oncology.  She has a significant right-sided hydronephrosis and will probably need a percutaneous nephrostomy tube.  If the tumor proceeds dramatically she may be candidate for later stenting procedure    Past Medical History:     Past Medical History:   Diagnosis Date    Arthritis     Cancer 2024    bladder    COPD (chronic obstructive pulmonary disease)     Hypertension     Psychosis     Seizures     last seizure x2 year ago    Substance abuse        Current Meds:     Current Outpatient Medications   Medication Sig Dispense Refill    buprenorphine-naloxone (SUBOXONE) 8-2 MG per SL tablet Place 2 tablets under the tongue Daily. Indications: Opioid Use Disorder (Continuation of Therapy) 6 tablet 0    FLUoxetine (PROzac) 40 MG capsule Take 1 capsule by mouth Daily. Indications: Depression 30 capsule 0    gabapentin (NEURONTIN) 400 MG capsule Take 1 capsule by mouth 2 (Two) Times a Day.      OLANZapine (zyPREXA) 20 MG tablet Take 1 tablet by mouth Every Night. Indications: psychosis 30 tablet 0    oxyCODONE-acetaminophen (Percocet)  MG per tablet Take 1 tablet by mouth Every 6 (Six) Hours As Needed for Moderate Pain (Pain). 10 tablet 0    prazosin (MINIPRESS) 2 MG capsule Take 1 capsule by mouth Every Night. Indications: Frightening Dreams 30 capsule 0    QUEtiapine (SEROquel) 200 MG tablet Take 1 tablet by mouth Every Night. Indications: Major Depressive Disorder 30 tablet 0    traZODone  (DESYREL) 100 MG tablet Take 1 tablet by mouth At Night As Needed for Sleep. Indications: Trouble Sleeping 30 tablet 0     No current facility-administered medications for this visit.        Allergies:      Allergies   Allergen Reactions    Codeine Itching and GI Intolerance        Past Surgical History:     Past Surgical History:   Procedure Laterality Date    ANKLE SURGERY      APPENDECTOMY      CLAVICLE SURGERY Right     TONSILLECTOMY      TRANSURETHRAL RESECTION OF BLADDER TUMOR N/A 5/20/2024    Procedure: CYSTOSCOPY TRANSURETHRAL RESECTION OF BLADDER TUMOR;  Surgeon: Ron Alves MD;  Location: [facility];  Service: Urology;  Laterality: N/A;       Social History:     Social History     Socioeconomic History    Marital status:    Tobacco Use    Smoking status: Every Day     Current packs/day: 1.00     Types: Cigarettes    Smokeless tobacco: Never   Vaping Use    Vaping status: Never Used   Substance and Sexual Activity    Alcohol use: Not Currently     Comment: denies at this time.    Drug use: Defer     Types: Other     Comment: DENIES drug use but on suboxone    Sexual activity: Defer     Partners: Male       Family History:     Family History   Problem Relation Age of Onset    Cancer Mother     Heart disease Father        Review of Systems:     Review of Systems   Constitutional: Negative.  Negative for activity change, appetite change, chills, diaphoresis, fatigue and unexpected weight change.   HENT:  Negative for congestion, dental problem, drooling, ear discharge, ear pain, facial swelling, hearing loss, mouth sores, nosebleeds, postnasal drip, rhinorrhea, sinus pressure, sneezing, sore throat, tinnitus, trouble swallowing and voice change.    Eyes: Negative.  Negative for photophobia, pain, discharge, redness, itching and visual disturbance.   Respiratory: Negative.  Negative for apnea, cough, choking, chest tightness, shortness of breath, wheezing and stridor.    Cardiovascular:  Negative.  Negative for chest pain, palpitations and leg swelling.   Gastrointestinal: Negative.  Negative for abdominal distention, abdominal pain, anal bleeding, blood in stool, constipation, diarrhea, nausea, rectal pain and vomiting.   Endocrine: Negative.  Negative for cold intolerance, heat intolerance, polydipsia, polyphagia and polyuria.   Musculoskeletal: Negative.  Negative for arthralgias, back pain, gait problem, joint swelling, myalgias, neck pain and neck stiffness.   Skin: Negative.  Negative for color change, pallor, rash and wound.   Allergic/Immunologic: Negative.  Negative for environmental allergies, food allergies and immunocompromised state.   Neurological: Negative.  Negative for dizziness, tremors, seizures, syncope, facial asymmetry, speech difficulty, weakness, light-headedness, numbness and headaches.   Hematological: Negative.  Negative for adenopathy. Does not bruise/bleed easily.   Psychiatric/Behavioral:  Negative for agitation, behavioral problems, confusion, decreased concentration, dysphoric mood, hallucinations, self-injury, sleep disturbance and suicidal ideas. The patient is not nervous/anxious and is not hyperactive.    All other systems reviewed and are negative.      Physical Exam:     Physical Exam  Constitutional:       Appearance: She is well-developed.   HENT:      Head: Normocephalic and atraumatic.      Right Ear: External ear normal.      Left Ear: External ear normal.   Eyes:      Conjunctiva/sclera: Conjunctivae normal.      Pupils: Pupils are equal, round, and reactive to light.   Cardiovascular:      Rate and Rhythm: Normal rate and regular rhythm.      Heart sounds: Normal heart sounds.   Pulmonary:      Effort: Pulmonary effort is normal.      Breath sounds: Normal breath sounds.   Abdominal:      General: Bowel sounds are normal. There is no distension.      Palpations: Abdomen is soft. There is no mass.      Tenderness: There is no abdominal tenderness. There is  no guarding or rebound.   Genitourinary:     Vagina: No vaginal discharge.      Comments: Bilateral swollen lower extremities  Musculoskeletal:         General: Normal range of motion.   Skin:     General: Skin is warm and dry.   Neurological:      Mental Status: She is alert.      Deep Tendon Reflexes: Reflexes are normal and symmetric.   Psychiatric:         Behavior: Behavior normal.         Thought Content: Thought content normal.         Judgment: Judgment normal.           Electronically signed by Ron Alves MD, 24, 10:28 AM EDT.  I have reviewed the following portions of the patient's history: Allergies, current medications, past family history, past medical history, past social history, past surgical history, problem list, and ROS and confirm it is accurate.    Recent Image (CT and/or KUB):      CT Abdomen and Pelvis: No results found for this or any previous visit.       CT Stone Protocol: No results found for this or any previous visit.       KUB: No results found for this or any previous visit.       Labs (past 3 months):      Admission on 2024, Discharged on 2024   Component Date Value Ref Range Status    HCG, Urine, QL 2024 Negative  Negative Final    Lot Number 2024 #[id number]   Final    Internal Positive Control 2024 Passed  Positive, Passed Final    Internal Negative Control 2024 Passed  Negative, Passed Final    Expiration Date 2024   Final    Reference Lab Report 2024    Final                    Value:[facility]  [address] [place] [address]  Phone: [phone] or [phone]  Fax: [phone]  Juventino Pride M.D., Medical Director    PATIENT NAME                           LABORATORY NO.  [id number]  MARY BETH SOLIS                           [id number]  [id number]                         AGE              SEX  SSN           CLIENT REF #  [facility]              54       "1970      [id number]   [id number]    [address]                     REQUESTING M.D.     ATTENDING M.D.     COPY TO.  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  Overlying urothelium is intact and  benign in appearance.  Review of the operative note in the electronic medical  record reveals an impression of \"right submucosal tumor consistent with cervical  cancer\".  In light of this information, immunohistochemical staining is  performed on block A1 to further evaluate the tumor.  The neoplastic cells are  diffusely positive for p16 and largely negative for GATA3.  The GATA3 stain  highlights intact benign urothelium.  The overall findings would support  gynecologic origin of the tumor.  Clinical correlation is essential.    Representative tumor block: A1    CLINICAL HISTORY:  Bladder mass    SPECIMENS RECEIVED:  URINARY BLADDER, TUR    MICROSCOPIC DESCRIPTION:  Tissue blocks are prepared and slides are examined microscopically. See  diagnosis for details.    The internal and external (both positive and negative) controls reacted  appropriately. Some of our immunohistochemical and in situ hybridization  studies are performed as                           analyte specific reagents. The following statement  applies to those tests: This test was developed, and its performance  characteristics determined by [facility]. It has not been  cleared or approved by the [place] Food and Drug Administration. However, the  FDA has determined that approval and clearance are not " "necessary.    Professional interpretation rendered by Clementina Casillas M.D., EVAN at  [employer], [address] [place], [place] [address].    GROSS DESCRIPTION:  Labeled as \"bladder tumor biopsy\", consisting of multiple pieces of tan-pink  bladder TUR weighing less than 1 g and measuring 1.5 x 0.8 x 0.2 cm in  aggregate, submitted entirely in 1 cassette.  SOG    REVIEWED, DIAGNOSED AND ELECTRONICALLY  SIGNED BY:    Clementina Casillas M.D., EVAN  CPT CODES:  79395, 33679, 85362     Admission on 05/14/2024, Discharged on 05/14/2024   Component Date Value Ref Range Status    Glucose 05/14/2024 119 (H)  65 - 99 mg/dL Final    BUN 05/14/2024 5 (L)  6 - 20 mg/dL Final    Creatinine 05/14/2024 0.84  0.57 - 1.00 mg/dL Final    Sodium 05/14/2024 139  136 - 145 mmol/L Final    Potassium 05/14/2024 3.5  3.5 - 5.2 mmol/L Final    Chloride 05/14/2024 99  98 - 107 mmol/L Final    CO2 05/14/2024 28.1  22.0 - 29.0 mmol/L Final    Calcium 05/14/2024 9.5  8.6 - 10.5 mg/dL Final    Total Protein 05/14/2024 8.3  6.0 - 8.5 g/dL Final    Albumin 05/14/2024 4.1  3.5 - 5.2 g/dL Final    ALT (SGPT) 05/14/2024 12  1 - 33 U/L Final    AST (SGOT) 05/14/2024 18  1 - 32 U/L Final    Alkaline Phosphatase 05/14/2024 99  39 - 117 U/L Final    Total Bilirubin 05/14/2024 0.2  0.0 - 1.2 mg/dL Final    Globulin 05/14/2024 4.2  gm/dL Final    A/G Ratio 05/14/2024 1.0  g/dL Final    BUN/Creatinine Ratio 05/14/2024 6.0 (L)  7.0 - 25.0 Final    Anion Gap 05/14/2024 11.9  5.0 - 15.0 mmol/L Final    eGFR 05/14/2024 83.2  >60.0 mL/min/1.73 Final    Protime 05/14/2024 12.2  12.1 - 14.7 Seconds Final    INR 05/14/2024 0.89 (L)  0.90 - 1.10 Final    PTT 05/14/2024 28.6  26.5 - 34.5 seconds Final    HCG Qualitative 05/14/2024 Negative  Negative Final    proBNP 05/14/2024 354.8  0.0 - 900.0 pg/mL Final    Blood Culture 05/14/2024 No growth at 5 days   Final    Blood Culture 05/14/2024 No growth at 5 days   Final    Lactate 05/14/2024 1.6  0.5 - 2.0 mmol/L " Final    Procalcitonin 05/14/2024 0.14  0.00 - 0.25 ng/mL Final    Sed Rate 05/14/2024 45 (H)  0 - 30 mm/hr Final    C-Reactive Protein 05/14/2024 0.42  0.00 - 0.50 mg/dL Final    Creatine Kinase 05/14/2024 81  20 - 180 U/L Final    Magnesium 05/14/2024 1.8  1.6 - 2.6 mg/dL Final    TSH 05/14/2024 1.160  0.270 - 4.200 uIU/mL Final    Free T4 05/14/2024 1.17  0.93 - 1.70 ng/dL Final    WBC 05/14/2024 10.61  3.40 - 10.80 10*3/mm3 Final    RBC 05/14/2024 4.33  3.77 - 5.28 10*6/mm3 Final    Hemoglobin 05/14/2024 13.2  12.0 - 15.9 g/dL Final    Hematocrit 05/14/2024 40.6  34.0 - 46.6 % Final    MCV 05/14/2024 93.8  79.0 - 97.0 fL Final    MCH 05/14/2024 30.5  26.6 - 33.0 pg Final    MCHC 05/14/2024 32.5  31.5 - 35.7 g/dL Final    RDW 05/14/2024 13.1  12.3 - 15.4 % Final    RDW-SD 05/14/2024 44.9  37.0 - 54.0 fl Final    MPV 05/14/2024 9.8  6.0 - 12.0 fL Final    Platelets 05/14/2024 339  140 - 450 10*3/mm3 Final    Neutrophil % 05/14/2024 65.8  42.7 - 76.0 % Final    Lymphocyte % 05/14/2024 19.1 (L)  19.6 - 45.3 % Final    Monocyte % 05/14/2024 10.0  5.0 - 12.0 % Final    Eosinophil % 05/14/2024 3.9  0.3 - 6.2 % Final    Basophil % 05/14/2024 0.9  0.0 - 1.5 % Final    Immature Grans % 05/14/2024 0.3  0.0 - 0.5 % Final    Neutrophils, Absolute 05/14/2024 6.98  1.70 - 7.00 10*3/mm3 Final    Lymphocytes, Absolute 05/14/2024 2.03  0.70 - 3.10 10*3/mm3 Final    Monocytes, Absolute 05/14/2024 1.06 (H)  0.10 - 0.90 10*3/mm3 Final    Eosinophils, Absolute 05/14/2024 0.41 (H)  0.00 - 0.40 10*3/mm3 Final    Basophils, Absolute 05/14/2024 0.10  0.00 - 0.20 10*3/mm3 Final    Immature Grans, Absolute 05/14/2024 0.03  0.00 - 0.05 10*3/mm3 Final    nRBC 05/14/2024 0.0  0.0 - 0.2 /100 WBC Final    Extra Tube 05/14/2024 Hold for add-ons.   Final    Auto resulted.    Extra Tube 05/14/2024 hold for add-on   Final    Auto resulted    Extra Tube 05/14/2024 Hold for add-ons.   Final    Auto resulted   Admission on 04/26/2024, Discharged on  04/26/2024   Component Date Value Ref Range Status    Glucose 04/26/2024 114 (H)  65 - 99 mg/dL Final    BUN 04/26/2024 4 (L)  6 - 20 mg/dL Final    Creatinine 04/26/2024 0.82  0.57 - 1.00 mg/dL Final    Sodium 04/26/2024 136  136 - 145 mmol/L Final    Potassium 04/26/2024 3.5  3.5 - 5.2 mmol/L Final    Chloride 04/26/2024 98  98 - 107 mmol/L Final    CO2 04/26/2024 24.2  22.0 - 29.0 mmol/L Final    Calcium 04/26/2024 9.4  8.6 - 10.5 mg/dL Final    Total Protein 04/26/2024 8.1  6.0 - 8.5 g/dL Final    Albumin 04/26/2024 4.1  3.5 - 5.2 g/dL Final    ALT (SGPT) 04/26/2024 13  1 - 33 U/L Final    AST (SGOT) 04/26/2024 17  1 - 32 U/L Final    Alkaline Phosphatase 04/26/2024 86  39 - 117 U/L Final    Total Bilirubin 04/26/2024 0.3  0.0 - 1.2 mg/dL Final    Globulin 04/26/2024 4.0  gm/dL Final    A/G Ratio 04/26/2024 1.0  g/dL Final    BUN/Creatinine Ratio 04/26/2024 4.9 (L)  7.0 - 25.0 Final    Anion Gap 04/26/2024 13.8  5.0 - 15.0 mmol/L Final    eGFR 04/26/2024 85.7  >60.0 mL/min/1.73 Final    Color, UA 04/26/2024 Yellow  Yellow, Straw Final    Appearance, UA 04/26/2024 Clear  Clear Final    pH, UA 04/26/2024 7.0  5.0 - 8.0 Final    Specific Gravity, UA 04/26/2024 1.008  1.005 - 1.030 Final    Glucose, UA 04/26/2024 Negative  Negative Final    Ketones, UA 04/26/2024 Negative  Negative Final    Bilirubin, UA 04/26/2024 Negative  Negative Final    Blood, UA 04/26/2024 Trace (A)  Negative Final    Protein, UA 04/26/2024 Negative  Negative Final    Leuk Esterase, UA 04/26/2024 Moderate (2+) (A)  Negative Final    Nitrite, UA 04/26/2024 Positive (A)  Negative Final    Urobilinogen, UA 04/26/2024 1.0 E.U./dL  0.2 - 1.0 E.U./dL Final    THC, Screen, Urine 04/26/2024 Negative  Negative Final    Phencyclidine (PCP), Urine 04/26/2024 Negative  Negative Final    Cocaine Screen, Urine 04/26/2024 Negative  Negative Final    Methamphetamine, Ur 04/26/2024 Negative  Negative Final    Opiate Screen 04/26/2024 Negative  Negative Final     Amphetamine Screen, Urine 04/26/2024 Negative  Negative Final    Benzodiazepine Screen, Urine 04/26/2024 Negative  Negative Final    Tricyclic Antidepressants Screen 04/26/2024 Negative  Negative Final    Methadone Screen, Urine 04/26/2024 Negative  Negative Final    Barbiturates Screen, Urine 04/26/2024 Negative  Negative Final    Oxycodone Screen, Urine 04/26/2024 Negative  Negative Final    Buprenorphine, Screen, Urine 04/26/2024 Positive (A)  Negative Final    Magnesium 04/26/2024 1.8  1.6 - 2.6 mg/dL Final    HCG, Urine QL 04/26/2024 Negative  Negative Final    Ethanol 04/26/2024 <10  0 - 10 mg/dL Final    Ethanol % 04/26/2024 <0.010  % Final    WBC 04/26/2024 10.99 (H)  3.40 - 10.80 10*3/mm3 Final    RBC 04/26/2024 4.15  3.77 - 5.28 10*6/mm3 Final    Hemoglobin 04/26/2024 12.8  12.0 - 15.9 g/dL Final    Hematocrit 04/26/2024 39.0  34.0 - 46.6 % Final    MCV 04/26/2024 94.0  79.0 - 97.0 fL Final    MCH 04/26/2024 30.8  26.6 - 33.0 pg Final    MCHC 04/26/2024 32.8  31.5 - 35.7 g/dL Final    RDW 04/26/2024 13.1  12.3 - 15.4 % Final    RDW-SD 04/26/2024 44.9  37.0 - 54.0 fl Final    MPV 04/26/2024 10.0  6.0 - 12.0 fL Final    Platelets 04/26/2024 287  140 - 450 10*3/mm3 Final    Neutrophil % 04/26/2024 63.2  42.7 - 76.0 % Final    Lymphocyte % 04/26/2024 24.2  19.6 - 45.3 % Final    Monocyte % 04/26/2024 9.8  5.0 - 12.0 % Final    Eosinophil % 04/26/2024 1.5  0.3 - 6.2 % Final    Basophil % 04/26/2024 1.0  0.0 - 1.5 % Final    Immature Grans % 04/26/2024 0.3  0.0 - 0.5 % Final    Neutrophils, Absolute 04/26/2024 6.94  1.70 - 7.00 10*3/mm3 Final    Lymphocytes, Absolute 04/26/2024 2.66  0.70 - 3.10 10*3/mm3 Final    Monocytes, Absolute 04/26/2024 1.08 (H)  0.10 - 0.90 10*3/mm3 Final    Eosinophils, Absolute 04/26/2024 0.17  0.00 - 0.40 10*3/mm3 Final    Basophils, Absolute 04/26/2024 0.11  0.00 - 0.20 10*3/mm3 Final    Immature Grans, Absolute 04/26/2024 0.03  0.00 - 0.05 10*3/mm3 Final    nRBC 04/26/2024 0.0   0.0 - 0.2 /100 WBC Final    Extra Tube 04/26/2024 Hold for add-ons.   Final    Auto resulted.    Extra Tube 04/26/2024 hold for add-on   Final    Auto resulted    Extra Tube 04/26/2024 Hold for add-ons.   Final    Auto resulted.    Extra Tube 04/26/2024 Hold for add-ons.   Final    Auto resulted    Fentanyl, Urine 04/26/2024 Negative  Negative Final    RBC, UA 04/26/2024 3-5 (A)  None Seen, 0-2 /HPF Final    WBC, UA 04/26/2024 21-50 (A)  None Seen, 0-2 /HPF Final    Bacteria, UA 04/26/2024 4+ (A)  None Seen /HPF Final    Squamous Epithelial Cells, UA 04/26/2024 0-2  None Seen, 0-2 /HPF Final    Hyaline Casts, UA 04/26/2024 None Seen  None Seen /LPF Final    Methodology 04/26/2024 Automated Microscopy   Final    Extra Tube 04/26/2024 Hold for add-ons.   Final    Auto resulted.    Urine Culture 04/26/2024 >100,000 CFU/mL Escherichia coli (A)   Final        Procedure:       Assessment/Plan:   Stage IIIb cervical cancer.  She has a right-sided hydronephrosis but has an obscured trigone of therefore I have recommended that she will probably need percutaneous nephrostomy at some point during her treatment.  She has grown lower extremity.  Her making an urgent referral to radiation oncology.            This document has been electronically signed by JO ANN GOMEZ MD May 28, 2024 12:56 EDT    Dictated Utilizing Dragon Dictation: Part of this note may be an electronic transcription/translation of spoken language to printed text using the Dragon Dictation System.

## 2024-05-29 ENCOUNTER — TELEPHONE (OUTPATIENT)
Dept: UROLOGY | Facility: CLINIC | Age: 54
End: 2024-05-29

## 2024-05-30 ENCOUNTER — TELEPHONE (OUTPATIENT)
Dept: UROLOGY | Facility: CLINIC | Age: 54
End: 2024-05-30
Payer: COMMERCIAL

## 2024-05-30 DIAGNOSIS — N32.89 BLADDER MASS: ICD-10-CM

## 2024-05-30 DIAGNOSIS — C53.9 CERVICAL CANCER, FIGO STAGE IIIB: Primary | ICD-10-CM

## 2024-05-30 RX ORDER — OXYCODONE AND ACETAMINOPHEN 10; 325 MG/1; MG/1
1 TABLET ORAL EVERY 6 HOURS PRN
Qty: 10 TABLET | Refills: 0 | Status: SHIPPED | OUTPATIENT
Start: 2024-05-30

## 2024-05-31 ENCOUNTER — TELEPHONE (OUTPATIENT)
Dept: UROLOGY | Facility: CLINIC | Age: 54
End: 2024-05-31
Payer: COMMERCIAL

## 2024-05-31 NOTE — TELEPHONE ENCOUNTER
Patient called requesting Dr Alves to send her in some pain pills, she said she hasn't had suboxone since Tuesday.

## 2024-05-31 NOTE — TELEPHONE ENCOUNTER
Tried to call the pt and let her know this was called in yesterday for her when she requested this. But there was no answer and no option for Vm

## 2024-06-02 NOTE — PROGRESS NOTES
Anna Moore  [id number]  1970      Reason for visit:  newly diagnosed cervical cancer     Consultation:  Patient is being seen at the request of Dr Alves     History of present illness:  The patient is a 54 y.o. year old female who presents today for treatment and evaluation of the above issues. Pt presented to the ER for bilateral leg swelling for 3 months. During work up she was diagnosed with a tumor in her bladder. She had a tumor in her bladder that was biopsied and showed cervical cancer. Pt has been having more daily vaginal bleeding and uses tampons. She states that she never went through menopause. She was having regular monthly bleeding up until 2 years ago and then it became more frequent. She had intercourse 4-5 months ago last but her partner did not notice any changes other than increased bleeding so she stopped. Denies vaginal pain. Has pain in L groin that has worsened over last 6 months. She denies changes in bladder and bowel function. Last pap was 5-10 years ago     On the pathology report, this is submucosal squamous cell carcinoma.  Therefore, this is not technically a stage IV cancer.  This would be at least a stage III due to hydronephrosis.  Patient was informed that she is not a surgical candidate based on extent of cancer.  We also discussed the concern for lymph node involvement given her bilateral lower extremity edema.  She reports some improvement in the associated cellulitis of the lower extremities with antibiotic management, but is out of the medication and would like some more.     For new patients, [facility] intake form from   was reviewed and confirmed.    OBGYN History:  She is a .  She does not use HRT. She does not have a history of abnormal pap smears. No mammograms or colonoscopies     Oncologic History:  Oncology/Hematology History    No history exists.         Past Medical History:   Diagnosis Date    Abdominal pain     Abnormal vaginal bleeding     Anxiety      Arthritis     Back pain     Bilateral swelling of feet     Cancer 2024    bladder    COPD (chronic obstructive pulmonary disease)     Hypertension     Joint pain     Muscle pain     Psychosis     Seizures     last seizure x2 year ago    Substance abuse        Past Surgical History:   Procedure Laterality Date    ANKLE SURGERY      APPENDECTOMY      CLAVICLE SURGERY Right     TONSILLECTOMY      TRANSURETHRAL RESECTION OF BLADDER TUMOR N/A 5/20/2024    Procedure: CYSTOSCOPY TRANSURETHRAL RESECTION OF BLADDER TUMOR;  Surgeon: Ron Alves MD;  Location: [facility];  Service: Urology;  Laterality: N/A;       MEDICATIONS:    Current Outpatient Medications:     buprenorphine-naloxone (SUBOXONE) 8-2 MG per SL tablet, Place 2 tablets under the tongue Daily. Indications: Opioid Use Disorder (Continuation of Therapy), Disp: 6 tablet, Rfl: 0    FLUoxetine (PROzac) 40 MG capsule, Take 1 capsule by mouth Daily. Indications: Depression, Disp: 30 capsule, Rfl: 0    gabapentin (NEURONTIN) 400 MG capsule, Take 1 capsule by mouth 2 (Two) Times a Day., Disp: , Rfl:     OLANZapine (zyPREXA) 20 MG tablet, Take 1 tablet by mouth Every Night. Indications: psychosis, Disp: 30 tablet, Rfl: 0    oxyCODONE-acetaminophen (Percocet)  MG per tablet, Take 1 tablet by mouth Every 6 (Six) Hours As Needed for Moderate Pain (Pain)., Disp: 10 tablet, Rfl: 0    prazosin (MINIPRESS) 2 MG capsule, Take 1 capsule by mouth Every Night. Indications: Frightening Dreams, Disp: 30 capsule, Rfl: 0    QUEtiapine (SEROquel) 200 MG tablet, Take 1 tablet by mouth Every Night. Indications: Major Depressive Disorder, Disp: 30 tablet, Rfl: 0    traZODone (DESYREL) 100 MG tablet, Take 1 tablet by mouth At Night As Needed for Sleep. Indications: Trouble Sleeping, Disp: 30 tablet, Rfl: 0    cephalexin (KEFLEX) 500 MG capsule, Take 1 capsule by mouth 2 (Two) Times a Day., Disp: 14 capsule, Rfl: 0     Allergies:  is allergic to codeine.    Social History:  "  Social History     Socioeconomic History    Marital status:    Tobacco Use    Smoking status: Every Day     Current packs/day: 1.00     Types: Cigarettes    Smokeless tobacco: Never   Vaping Use    Vaping status: Never Used   Substance and Sexual Activity    Alcohol use: Not Currently     Comment: denies at this time.    Drug use: Defer     Types: Other, Marijuana     Comment: DENIES drug use but on suboxone. States she used xanax    Sexual activity: Defer     Partners: Male   2 packs per day     Family History:    Family History   Problem Relation Age of Onset    Heart disease Father     Cancer Mother     Lung cancer Mother        Health Maintenance:    Health Maintenance   Topic Date Due    MAMMOGRAM  Never done    BMI FOLLOWUP  Never done    COLORECTAL CANCER SCREENING  Never done    Pneumococcal Vaccine 0-64 (1 of 2 - PCV) Never done    Hepatitis B (1 of 3 - 19+ 3-dose series) Never done    ZOSTER VACCINE (1 of 2) Never done    ANNUAL PHYSICAL  Never done    PAP SMEAR  Never done    COVID-19 Vaccine (1 - 2023-24 season) Never done    INFLUENZA VACCINE  08/01/2024    TDAP/TD VACCINES (3 - Td or Tdap) 12/05/2026    HEPATITIS C SCREENING  Completed       Review of Systems:  Please refer to history of present illness.  Review of systems otherwise negative.  Physical Exam:  Vitals:    06/03/24 1236   BP: 151/74   Pulse: 101   Resp: 17   Temp: 97.3 °F (36.3 °C)   TempSrc: Temporal   SpO2: 96%   Weight: 73 kg (161 lb)   Height: 160 cm (62.99\")   PainSc:   8   PainLoc: Back     Body mass index is 28.53 kg/m².  Wt Readings from Last 3 Encounters:   06/03/24 73 kg (161 lb)   05/28/24 70.6 kg (155 lb 9.6 oz)   05/21/24 72 kg (158 lb 12.8 oz)     PHQ-9 Depression Screening  Little interest or pleasure in doing things?     Feeling down, depressed, or hopeless?     Trouble falling or staying asleep, or sleeping too much?     Feeling tired or having little energy?     Poor appetite or overeating?     Feeling bad " about yourself - or that you are a failure or have let yourself or your family down?     Trouble concentrating on things, such as reading the newspaper or watching television?     Moving or speaking so slowly that other people could have noticed? Or the opposite - being so fidgety or restless that you have been moving around a lot more than usual?     Thoughts that you would be better off dead, or of hurting yourself in some way?     PHQ-9 Total Score     If you checked off any problems, how difficult have these problems made it for you to do your work, take care of things at home, or get along with other people?         GENERAL: Alert, well-appearing female appearing her stated age who is in no apparent distress.   HEENT: Sclera anicteric. Head normocephalic, atraumatic. Mucus membranes moist.   BREASTS: Deferred  CARDIOVASCULAR: Normal rate, regular rhythm, no murmurs, rubs, or gallops.  + peripheral edema.  RESPIRATORY: Clear to auscultation bilaterally, normal respiratory effort  GASTROINTESTINAL:  Abdomen is soft, non-tender, non-distended, no rebound or guarding,  no masses, or hernias.   SKIN:  Warm, dry, well-perfused.  All visible areas intact.  No rashes, lesions, ulcers.  PSYCHIATRIC: AO x3, with appropriate affect, normal thought processes.  NEUROLOGIC: No focal deficits.  Moves extremities well.  MUSCULOSKELETAL: Normal gait and station.   EXTREMITIES:   + erythema in R leg, bilateral pitting edema +2       PELVIC exam:  External genitalia are free from lesion. On speculum examination, the cervix had visible necrotic tumor at the external os. On bimanual examination cervical mass with extension at the lower uterine segment was appreciated with a prominent posterior component and R>L parametrial involvement.  Limited mobility.  No mass otherwise appreciated.  Rectovaginal exam was deferred.     ECOG PS 0     PROCEDURES:  None     Diagnostic Data:     CT Angio Abdominal Aorta Bilateral Iliofem  Runoff    Result Date: 5/14/2024  1.  Diffuse edema of the right lower extremity beginning at the level of the hip to the level of the foot and is nonspecific, but is likely secondary to mass-occupying process in the right iliac fossa. 2.  Mild to moderate calcified plaque with minimal noncalcified plaque of the distal abdominal aorta with mild medium segment stenosis. No aneurysm or dissection identified. 3.  Marked chronic appearing right hydronephrosis is noted with ureteral dilatation to the level of the urinary bladder where there is irregular wall thickening of the urinary bladder with enhancement suspicious for right posterior bladder wall urothelial neoplasm. Alternatively, gynecologic neoplastic process with local invasion may be considered as well extending into the right UVJ region. This would likely account for asymmetric edema of the right lower extremity. Cystoscopy is warranted for further evaluation. Thinning of the right renal cortex and parenchyma would indicate chronic etiology. 4.  Moderate-advanced calcified and noncalcified plaque of the right common iliac artery with medium segment moderate stenosis. 5.  Moderate calcified plaque with mild noncalcified plaque left common iliac artery with mild medium segment stenosis. 6.  Abnormal nodular-type soft tissue in the right retroperitoneal stations along the IVC compatible with retroperitoneal adenopathy. 7.  Irregular soft tissue centered on the right UVJ region of the posterior bladder wall extending into the perirectal space and right iliac fossa spans approximately 5.6 x 4.0 cm. 8.  Nodular liver margins indicative of cirrhosis. 9.  Common bile duct dilatation is noted., Approximate 13.2 mm diameter. Correlate with laboratory data. 10. Other nonacute findings above.   This report was finalized on 5/14/2024 12:47 PM by Dr. Jay Jay Watts MD.      US Arterial Doppler Lower Extremity Bilateral    Result Date: 5/14/2024  1. No segments of  significant stenosis or occlusion. 2. Monophasic waveforms indicative of diffuse arterial inflow disease.  This report was finalized on 5/14/2024 11:25 AM by Dr. Jay Jay Watts MD.      US Venous Doppler Lower Extremity Bilateral (duplex)    Result Date: 5/14/2024  1.  No DVT. 2.  Soft tissue edema of the right greater than left lower extremities. 3.  Reactive appearing right inguinal lymph node is noted.   This report was finalized on 5/14/2024 11:25 AM by Dr. Jay Jay Watts MD.      CT Angio Abdominal Aorta Bilateral Iliofem Runoff    Result Date: 5/14/2024  1.  Diffuse edema of the right lower extremity beginning at the level of the hip to the level of the foot and is nonspecific, but is likely secondary to mass-occupying process in the right iliac fossa. 2.  Mild to moderate calcified plaque with minimal noncalcified plaque of the distal abdominal aorta with mild medium segment stenosis. No aneurysm or dissection identified. 3.  Marked chronic appearing right hydronephrosis is noted with ureteral dilatation to the level of the urinary bladder where there is irregular wall thickening of the urinary bladder with enhancement suspicious for right posterior bladder wall urothelial neoplasm. Alternatively, gynecologic neoplastic process with local invasion may be considered as well extending into the right UVJ region. This would likely account for asymmetric edema of the right lower extremity. Cystoscopy is warranted for further evaluation. Thinning of the right renal cortex and parenchyma would indicate chronic etiology. 4.  Moderate-advanced calcified and noncalcified plaque of the right common iliac artery with medium segment moderate stenosis. 5.  Moderate calcified plaque with mild noncalcified plaque left common iliac artery with mild medium segment stenosis. 6.  Abnormal nodular-type soft tissue in the right retroperitoneal stations along the IVC compatible with retroperitoneal adenopathy. 7.  Irregular soft  "tissue centered on the right UVJ region of the posterior bladder wall extending into the perirectal space and right iliac fossa spans approximately 5.6 x 4.0 cm. 8.  Nodular liver margins indicative of cirrhosis. 9.  Common bile duct dilatation is noted., Approximate 13.2 mm diameter. Correlate with laboratory data. 10. Other nonacute findings above.   This report was finalized on 5/14/2024 12:47 PM by Dr. Jay Jay Watts MD.      US Arterial Doppler Lower Extremity Bilateral    Result Date: 5/14/2024  1. No segments of significant stenosis or occlusion. 2. Monophasic waveforms indicative of diffuse arterial inflow disease.  This report was finalized on 5/14/2024 11:25 AM by Dr. Jay Jay Watts MD.      US Venous Doppler Lower Extremity Bilateral (duplex)    Result Date: 5/14/2024  1.  No DVT. 2.  Soft tissue edema of the right greater than left lower extremities. 3.  Reactive appearing right inguinal lymph node is noted.   This report was finalized on 5/14/2024 11:25 AM by Dr. Jay Jay Watts MD.           Lab Results   Component Value Date    WBC 10.61 05/14/2024    HGB 13.2 05/14/2024    HCT 40.6 05/14/2024    MCV 93.8 05/14/2024     05/14/2024    NEUTROABS 6.98 05/14/2024    GLUCOSE 119 (H) 05/14/2024    BUN 5 (L) 05/14/2024    CREATININE 0.84 05/14/2024    EGFRIFNONA 113 02/13/2019     05/14/2024    K 3.5 05/14/2024    CL 99 05/14/2024    CO2 28.1 05/14/2024    MG 1.8 05/14/2024    CALCIUM 9.5 05/14/2024    ALBUMIN 4.1 05/14/2024    AST 18 05/14/2024    ALT 12 05/14/2024    BILITOT 0.2 05/14/2024     Lab Results   Component Value Date    TSH 1.160 05/14/2024     No results found for: \"FT4\"  No results found for: \"\"    Assessment & Plan   This is a 54 y.o. woman with newly diagnosed advanced cervical cancer    #Advanced cervical cancer diagnosed at the time of TURP;  -s/p bladder tumor resection with urology that had bx proven disease  -clinical Stg III, bordering on stage IV but no mucosal " involvement on bladder biopsy  -discussed advanced stage diagnosis with patient and her son  -order placed for PET scan as only imaging is a CTA  -with her having Stg III cancer, recommendation is to start chemotherapy with radiation; pt declined chemotherapy due to mother who needed chemo for lung cancer  -discussed possibility of immunotherapy. Will request PDL-1 testing on biopsy. Discussed adverse effects of immunotherapy like thyroiditis, gastritis, colitis, dermatitis. Pt seems more interested in this if she is a candidate  -Will make referral to radiation oncology to begin treatment  -due to severe hydronephrosis could consider IR for PCNT; after discussion of pros and cons of PCN T she declines.  *Dr. Mckeon has graciously agreed to see patient as a work in this afternoon due to transportation issues.    #Smoker  -2 ppd; discussed smoking cessation    #Schizoaffective disorder  #PTSD  -stable on meds     #Hx of OUD  -on suboxone and stable    # Bilateral lower extremity cellulitis, right greater than left  Keflex  Patient advised that this is likely related to tumor and I do anticipate a great amount of improvement related to antibiotic management.  She prefer antibiotics regardless.    Encounter Diagnoses   Name Primary?    Cervical cancer, FIGO stage IIIB Yes    Tobacco abuse     Transportation insecurity     Financial insecurity     Cellulitis of right lower extremity        Pain assessment was performed today as a part of patient’s care.  For patients with pain related to surgery, gynecologic malignancy or cancer treatment, the plan is as noted in the assessment/plan.  For patients with pain not related to these issues, they are to seek any further needed care from a more appropriate provider, such as PCP.      Orders Placed This Encounter   Procedures    NM PET/CT Skull Base to Mid Thigh     Standing Status:   Future     Standing Expiration Date:   6/3/2025     Order Specific Question:   What  radiopharmaceutical is preferred for this exam?     Answer:   [employer]  (offered at all sites)     Order Specific Question:   Reason for Exam:     Answer:   cervical cancer     Order Specific Question:   Release to patient     Answer:   Routine Release [[id number]]    Ambulatory Referral to Radiation Oncology     Referral Priority:   Routine     Referral Type:   Consultation     Referral Reason:   Specialty Services Required     Requested Specialty:   Radiation Oncology     Number of Visits Requested:   1    Ambulatory Referral to Social Care Services (Amb Case Mgmt)     Referral Priority:   Routine     Referral Type:   Social Care Notification     Referral Reason:   Specialty Services Required     Requested Specialty:   Population Health     Number of Visits Requested:   1       FOLLOW UP: Patient to call if she has not heard from the office in 1 week regarding PD-L1 testing.  For completeness sake we will also get HER2/ellie testing.    Shannon Mazariegos MD     I spent 60 minutes caring for Anna on this date of service. This time includes time spent by me in the following activities: preparing for the visit, reviewing tests, performing a medically appropriate examination and/or evaluation, counseling and educating the patient/family/caregiver, ordering medications, tests, or procedures, referring and communicating with other health care professionals, documenting information in the medical record, and care coordination    Patient was seen and examined with Dr. Mazariegos,  resident, who performed portions of the examination and documentation for this patient's care under my direct supervision.  I agree with the above documentation and plan.    Olga Castro MD  06/03/24  13:55 EDT

## 2024-06-03 ENCOUNTER — HOSPITAL ENCOUNTER (OUTPATIENT)
Dept: RADIATION ONCOLOGY | Facility: HOSPITAL | Age: 54
Setting detail: RADIATION/ONCOLOGY SERIES
End: 2024-06-03
Payer: COMMERCIAL

## 2024-06-03 ENCOUNTER — OFFICE VISIT (OUTPATIENT)
Dept: GYNECOLOGIC ONCOLOGY | Facility: CLINIC | Age: 54
End: 2024-06-03
Payer: COMMERCIAL

## 2024-06-03 ENCOUNTER — DOCUMENTATION (OUTPATIENT)
Dept: OTHER | Facility: HOSPITAL | Age: 54
End: 2024-06-03
Payer: COMMERCIAL

## 2024-06-03 ENCOUNTER — OFFICE VISIT (OUTPATIENT)
Dept: RADIATION ONCOLOGY | Facility: HOSPITAL | Age: 54
End: 2024-06-03
Payer: COMMERCIAL

## 2024-06-03 ENCOUNTER — TELEPHONE (OUTPATIENT)
Dept: GYNECOLOGIC ONCOLOGY | Facility: CLINIC | Age: 54
End: 2024-06-03

## 2024-06-03 VITALS
HEIGHT: 63 IN | BODY MASS INDEX: 28.35 KG/M2 | RESPIRATION RATE: 20 BRPM | TEMPERATURE: 97.9 F | HEART RATE: 101 BPM | OXYGEN SATURATION: 94 % | SYSTOLIC BLOOD PRESSURE: 135 MMHG | WEIGHT: 160 LBS | DIASTOLIC BLOOD PRESSURE: 69 MMHG

## 2024-06-03 VITALS
BODY MASS INDEX: 28.53 KG/M2 | OXYGEN SATURATION: 96 % | HEIGHT: 63 IN | RESPIRATION RATE: 17 BRPM | WEIGHT: 161 LBS | HEART RATE: 101 BPM | DIASTOLIC BLOOD PRESSURE: 74 MMHG | TEMPERATURE: 97.3 F | SYSTOLIC BLOOD PRESSURE: 151 MMHG

## 2024-06-03 DIAGNOSIS — C53.9 CERVICAL CANCER, FIGO STAGE IIIB: Primary | ICD-10-CM

## 2024-06-03 DIAGNOSIS — Z59.82 TRANSPORTATION INSECURITY: ICD-10-CM

## 2024-06-03 DIAGNOSIS — Z59.86 FINANCIAL INSECURITY: ICD-10-CM

## 2024-06-03 DIAGNOSIS — Z72.0 TOBACCO ABUSE: ICD-10-CM

## 2024-06-03 DIAGNOSIS — L03.115 CELLULITIS OF RIGHT LOWER EXTREMITY: ICD-10-CM

## 2024-06-03 PROCEDURE — G0463 HOSPITAL OUTPT CLINIC VISIT: HCPCS

## 2024-06-03 RX ORDER — CEPHALEXIN 500 MG/1
500 CAPSULE ORAL 2 TIMES DAILY
Qty: 14 CAPSULE | Refills: 0 | Status: SHIPPED | OUTPATIENT
Start: 2024-06-03

## 2024-06-03 SDOH — ECONOMIC STABILITY - TRANSPORTATION SECURITY: TRANSPORTATION INSECURITY: Z59.82

## 2024-06-03 SDOH — ECONOMIC STABILITY - INCOME SECURITY: FINANCIAL INSECURITY: Z59.86

## 2024-06-03 NOTE — PROGRESS NOTES
New Patient Office Visit      Encounter Date: 06/03/2024   Patient Name: Anna Moore  YOB: 1970   Medical Record Number: [id number]   Primary Diagnosis: Cervical cancer, FIGO stage IIIB [C53.9]   Cancer Staging: Cancer Staging   Cervical cancer, FIGO stage IIIB      Chief Complaint:    Chief Complaint   Patient presents with   • Cervical Cancer       History of Present Illness: Anna Moore is a 54 y.o. 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     She was evaluated by urologist Dr. Ron Alves for workup of a bladder lesion and lower extremity edema/leg pain. This led to a CT Angiogram of the abd/pelvis and cystoscopy.     Her CT Angiogram of the abd/pelvis (5/14) identified right hydronephrosis with ureteral dilatation to the level of the bladder with enhancement concerning for neoplastic involvement of the right posterior bladder wall. There is a soft tissue mass with involvement of the perirectal space and right iliac fossa.     Cystoscopy (5/20) appearance of the lesion was felt to be unusual for a transitional cell neoplasm, so she had a TURBT for further characterization. Procedure reports describe a submucosal bladder tumor believed to be  The pathology from this procedure resulted as a p16 + squamous cell carcinoma involving the submucosa of the bladder. Overlying urothelium was intact and reported to be benign in appearance on pathology report.     She has not had an EUA with a gynecologist but established care with Dr. Castro this afternoon. She has a PET/CT pending.     She has pelvic discomfort and ongoing vaginal bleeding. She reports soaking through one tampon a day. She denies hematuria or dysuria. She denies melena or hematochezia. She had difficulty remaining alert and awake through conversation and history was supplemented with information from her son.     Prior Radiation  History: no  Pacemaker or ICD: no  Pregnant or Nursing: No    Subjective        Review of Systems: Review of Systems   Constitutional:  Positive for fatigue.   Cardiovascular:  Positive for leg swelling.        Pt has +2 edema in yohan ankles and feet.   Gastrointestinal:  Positive for abdominal pain.        Pt reports Left Lower Quad pain that she believes is r/t ovarian pain   Musculoskeletal:  Positive for back pain and myalgias.   Skin:         Yohan LE's Cellulitis/Lymphedema that does intermittently weep.    Neurological:  Positive for seizures.        Pt reports last seizure 2-3 years ago and does not currently take anti-seizure medication.   Psychiatric/Behavioral:  The patient is nervous/anxious.        Past Medical History:   Past Medical History:   Diagnosis Date   • Abdominal pain    • Abnormal vaginal bleeding    • Anxiety    • Arthritis    • Back pain    • Bilateral swelling of feet    • Cancer 2024    bladder   • Cervical cancer    • COPD (chronic obstructive pulmonary disease)    • Hypertension    • Joint pain    • Muscle pain    • Psychosis    • Seizures     last seizure x2 year ago   • Substance abuse        Past Surgical History:   Past Surgical History:   Procedure Laterality Date   • ANKLE SURGERY     • APPENDECTOMY     • CLAVICLE SURGERY Right    • TONSILLECTOMY     • TRANSURETHRAL RESECTION OF BLADDER TUMOR N/A 5/20/2024    Procedure: CYSTOSCOPY TRANSURETHRAL RESECTION OF BLADDER TUMOR;  Surgeon: Ron Alves MD;  Location: [facility];  Service: Urology;  Laterality: N/A;       Family History:   Family History   Problem Relation Age of Onset   • Heart disease Father    • Cancer Mother    • Lung cancer Mother        Social History:   Social History     Socioeconomic History   • Marital status:    Tobacco Use   • Smoking status: Every Day     Current packs/day: 1.50     Types: Cigarettes   • Smokeless tobacco: Never   Vaping Use   • Vaping status: Never Used   Substance and Sexual  Activity   • Alcohol use: Not Currently     Comment: denies at this time.   • Drug use: Defer     Types: Other, Marijuana     Comment: DENIES drug use but on suboxone. States she used xanax   • Sexual activity: Defer     Partners: Male       Medications:     Current Outpatient Medications:   •  buprenorphine-naloxone (SUBOXONE) 8-2 MG per SL tablet, Place 2 tablets under the tongue Daily. Indications: Opioid Use Disorder (Continuation of Therapy), Disp: 6 tablet, Rfl: 0  •  cephalexin (KEFLEX) 500 MG capsule, Take 1 capsule by mouth 2 (Two) Times a Day., Disp: 14 capsule, Rfl: 0  •  FLUoxetine (PROzac) 40 MG capsule, Take 1 capsule by mouth Daily. Indications: Depression, Disp: 30 capsule, Rfl: 0  •  gabapentin (NEURONTIN) 400 MG capsule, Take 1 capsule by mouth 2 (Two) Times a Day., Disp: , Rfl:   •  OLANZapine (zyPREXA) 20 MG tablet, Take 1 tablet by mouth Every Night. Indications: psychosis, Disp: 30 tablet, Rfl: 0  •  oxyCODONE-acetaminophen (Percocet)  MG per tablet, Take 1 tablet by mouth Every 6 (Six) Hours As Needed for Moderate Pain (Pain)., Disp: 10 tablet, Rfl: 0  •  prazosin (MINIPRESS) 2 MG capsule, Take 1 capsule by mouth Every Night. Indications: Frightening Dreams, Disp: 30 capsule, Rfl: 0  •  QUEtiapine (SEROquel) 200 MG tablet, Take 1 tablet by mouth Every Night. Indications: Major Depressive Disorder, Disp: 30 tablet, Rfl: 0  •  traZODone (DESYREL) 100 MG tablet, Take 1 tablet by mouth At Night As Needed for Sleep. Indications: Trouble Sleeping, Disp: 30 tablet, Rfl: 0    Allergies:   Allergies   Allergen Reactions   • Codeine Itching and GI Intolerance       Measures:   Advanced Care Plan: N Advanced Care Planning was discussed. The patient does not have a living will documented in the medical record and declined to perform one today.  KPS/Quality of Life: 80 - Restricted Physical Activity  ECOG: (1) Restricted in physically strenuous activity, ambulatory and able to do work of light  "nature(1) Restricted in Physically Strenuous Activity, Ambulatory & Able to Do Work of Light Nature      Objective     Physical Exam:   Vital Signs:   Vitals:    06/03/24 1450   BP: 135/69   Pulse: 101   Resp: 20   Temp: 97.9 °F (36.6 °C)   TempSrc: Temporal   SpO2: 94%   Weight: 72.6 kg (160 lb)   Height: 160 cm (63\")   PainSc:   8   PainLoc: Back  Comment: Back, Yohan LE's, Left Groin     Body mass index is 28.34 kg/m².     Constitutional: The patient is a well-developed, well-nourished female  in no acute distress.  Alert and oriented ×3.  General: NAD, sitting comfortably  Eye: EOMI, anicteric sclerae  HENT: NC/AT, MMM  Neck: No JVD or cervical lymphadenopathy  Respiratory: Symmetric expansion, nonlabored respiration  Cardiovascular: Regular rate and rhythm.  No murmurs, rubs, or gallops are appreciated.  Abdomen: nontender, nondistended.   Musculoskeletal: No obvious joint deformities, range of motion intact in all 4 extremities  Neuro: Alert oriented x3, cranial nerves III through XII are grossly intact, with no focal neurological deficits noted on exam.  Psych: Mood and affect appropriate  Gyn - deferred per pt request as she came directly from gyn office - Dr. Castro reported presence of necrotic tumor at external os of cervix with extension into lower uterine segment and R>L parametrial involvement    Imaging:   CT Angiography - abd/pelvis 5/14/24    IMPRESSION:  1.  Diffuse edema of the right lower extremity beginning at the level of  the hip to the level of the foot and is nonspecific, but is likely  secondary to mass-occupying process in the right iliac fossa.  2.  Mild to moderate calcified plaque with minimal noncalcified plaque  of the distal abdominal aorta with mild medium segment stenosis. No  aneurysm or dissection identified.  3.  Marked chronic appearing right hydronephrosis is noted with ureteral  dilatation to the level of the urinary bladder where there is irregular  wall thickening of the " urinary bladder with enhancement suspicious for  right posterior bladder wall urothelial neoplasm. Alternatively,  gynecologic neoplastic process with local invasion may be considered as  well extending into the right UVJ region. This would likely account for  asymmetric edema of the right lower extremity. Cystoscopy is warranted  for further evaluation. Thinning of the right renal cortex and  parenchyma would indicate chronic etiology.  4.  Moderate-advanced calcified and noncalcified plaque of the right  common iliac artery with medium segment moderate stenosis.  5.  Moderate calcified plaque with mild noncalcified plaque left common  iliac artery with mild medium segment stenosis.  6.  Abnormal nodular-type soft tissue in the right retroperitoneal  stations along the IVC compatible with retroperitoneal adenopathy.  7.  Irregular soft tissue centered on the right UVJ region of the  posterior bladder wall extending into the perirectal space and right  iliac fossa spans approximately 5.6 x 4.0 cm.  8.  Nodular liver margins indicative of cirrhosis.  9.  Common bile duct dilatation is noted., Approximate 13.2 mm diameter.  Correlate with laboratory data.  10. Other nonacute findings above.      Assessment / Plan      Assessment/Plan:   Anna Moore is a pleasant 54 y.o. female diagnosed with at least a FIGO stage 3b squamous cell carcinoma of the cervix. She had submucosal She is seen today as an add on consultation due to difficulty she has with transportation to [place]. She recently established care with gyn oncologist, Dr. Olga Castro. We discussed the importance of compliance with upcoming PET/CT, which will provide necessary information for completion of staging and treatment.  Dr. Castro and I discussed her case today. We are optimistic that though her disease is locally advanced, she will still be a candidate for curative intent therapy. We discussed that the based on current available information, our  recommendation for treatment will be concurrent chemoradiation with radiation therapy consisting of external beam and brachytherapy components. She is unsure if she will have chemotherapy as recommended due to quality of life concerns. She is unable to travel to [place] for external beam treatments and has requested a referral to [place], where her son lives and can assist with transportation. I have spoken with her about receiving brachytherapy at the [facility] and would like to refer her to Dr. Brie Panchal, radiation oncologist specializing in the management of gynecologic cancers, whose expertise and skill with hybrid applicators if needed will be beneficial with the extent and involvement of disease.      Diagnoses and all orders for this visit:    1. Cervical cancer, FIGO stage IIIB (Primary)  -     Ambulatory Referral to ONC Social Work  -     Ambulatory Referral to Radiation Oncology-External  -     Ambulatory Referral to Radiation Oncology-External         Follow Up:  Return for Office Visit.      Time:   I spent 65 minutes on this encounter today, 6/3/24. Activities that took place during this time include: preparing to see the patient, obtaining history, reviewing separately obtained history, performing a medically appropriate examination and evaluation, counseling and educating the patient, ordering medications/tests/procedures, communicating with other healthcare providers,  and coordinating care for this patient.     Sincerely,        Graciela Mckeon MD  Radiation Oncology   This document has been signed by Graciela Mckeon MD on June 4, 2024 10:31 EDT     NOTICE TO PATIENTS:   At [facility], we believe that sharing information builds trust and better relationships. You are receiving this note because you recently visited [facility]. It is possible you will see health information before a provider has talked with you about it. This kind of information can be easy to misunderstand.  To help you fully understand what it means for your health, we urge you to discuss this note with your provider.

## 2024-06-03 NOTE — SIGNIFICANT NOTE
SW received request from medical team to assist with gas cards for pt. NADIYA provided team with $20 in [employer] gas cards, with contact information to reach out should needs arise. NADIYA will be available ongoing.       06/03/24 1300   Oncology Interventions   Transportation Needs gas cards  (provided $20 in gas cards)

## 2024-06-03 NOTE — TELEPHONE ENCOUNTER
----- Message from Olga Castro sent at 6/3/2024  1:57 PM EDT -----  Please call [facility] labs and order PD-L1 testing and HER2/ellie testing on pathology from bladder biopsy.  Thank you!

## 2024-06-05 ENCOUNTER — TELEPHONE (OUTPATIENT)
Dept: RADIATION ONCOLOGY | Facility: HOSPITAL | Age: 54
End: 2024-06-05
Payer: COMMERCIAL

## 2024-06-05 ENCOUNTER — TELEPHONE (OUTPATIENT)
Dept: GYNECOLOGIC ONCOLOGY | Facility: CLINIC | Age: 54
End: 2024-06-05

## 2024-06-05 ENCOUNTER — CONSULT (OUTPATIENT)
Dept: RADIATION ONCOLOGY | Facility: HOSPITAL | Age: 54
End: 2024-06-05
Payer: COMMERCIAL

## 2024-06-05 VITALS
HEART RATE: 97 BPM | OXYGEN SATURATION: 94 % | SYSTOLIC BLOOD PRESSURE: 131 MMHG | RESPIRATION RATE: 18 BRPM | TEMPERATURE: 98.5 F | DIASTOLIC BLOOD PRESSURE: 87 MMHG

## 2024-06-05 DIAGNOSIS — Z59.82 TRANSPORTATION INSECURITY: ICD-10-CM

## 2024-06-05 DIAGNOSIS — C53.9 CERVICAL CANCER, FIGO STAGE IIIB: Primary | ICD-10-CM

## 2024-06-05 DIAGNOSIS — Z59.86 FINANCIAL INSECURITY: ICD-10-CM

## 2024-06-05 PROCEDURE — 1125F AMNT PAIN NOTED PAIN PRSNT: CPT | Performed by: RADIOLOGY

## 2024-06-05 PROCEDURE — 99204 OFFICE O/P NEW MOD 45 MIN: CPT | Performed by: RADIOLOGY

## 2024-06-05 PROCEDURE — G0463 HOSPITAL OUTPT CLINIC VISIT: HCPCS | Performed by: RADIOLOGY

## 2024-06-05 SDOH — ECONOMIC STABILITY - INCOME SECURITY: FINANCIAL INSECURITY: Z59.86

## 2024-06-05 SDOH — ECONOMIC STABILITY - TRANSPORTATION SECURITY: TRANSPORTATION INSECURITY: Z59.82

## 2024-06-05 NOTE — TELEPHONE ENCOUNTER
MA received referral today for patient to be seen for Radiation Therapy. MA tried calling patient and her son to schedule consultation appointment. No answer or VM on patient mobile, left VM on son's phone.

## 2024-06-05 NOTE — TELEPHONE ENCOUNTER
Caller: Anna Moore    Relationship: Self    Best call back number: [phone]     Requested Prescriptions:   SOMETHING FOR PAIN    Pharmacy where request should be sent: [id number] [address] [facility] [address] [facility] [address] - [phone] PH - [phone] FX     Last office visit with prescribing clinician: 6/3/2024   Last telemedicine visit with prescribing clinician: Visit date not found   Next office visit with prescribing clinician: Visit date not found

## 2024-06-05 NOTE — TELEPHONE ENCOUNTER
Luigi returned MA's phone call. MA offered a consultation appointment for patient today if willing and able. Luigi agreed to bring patient today at 1:00PM.  and Physician both aware.

## 2024-06-05 NOTE — PROGRESS NOTES
New Patient Office Consult      Patient Name: Anna Moore  : 1970   MRN: [id number]     Requesting Physician: Graciela Mckeon MD    Chief Complaint:  No chief complaint on file.  Lower extremity edema  Pathology: * Cannot find OR log *   Staging: Cervical cancer, FIGO stage IIIB (staging workup pending)  - No stage assigned       History of Present Illness: Anna Moore is a pleasant 54 y.o. female who is here today for consultation regarding newly diagnosed (likely) locally advanced squamous cell carcinoma of the cervix.    She was initially evaluated for lower extremity edema and leg pain as well as a possible bladder lesion. CT angiogram was performed on 2024 showing right sided hydronephrosis with ureteral dilatation to the level of the bladder with enhancement concerning for neoplastic process with involvement of the right posterior bladder wall as well as soft tissue mass with involvement of the perirectal space and right iliac fossa. Cystoscopy on 2024 led to TURBT for further classification with pathology showing p16+ squamous cell carcinoma involving the submucosa of the bladder. She has established care with GYN but has not had EUA yet I believe. She also has a PET/CT pending for staging. She has had ongoing vaginal bleeding and pelvic discomfort with soaking through 1 tampon a day. She has low back pain and lower extremity swelling. She denies hematuria, dysuria, melena, or hematochezia.     She smokes 1-1.5ppd. She states that she is trying to reduce this. She is insistent that she does not want chemo.     Subjective      Review of Systems:   Review of Systems   Constitutional:  Positive for fatigue.   Gastrointestinal:  Positive for abdominal distention.   Genitourinary:  Positive for pelvic pain, pelvic pressure and vaginal bleeding.   Musculoskeletal:  Positive for back pain.   Skin:  Positive for color change.   All other systems reviewed and are negative.    Review of Systems    Constitutional:  Positive for fatigue.   Gastrointestinal:  Positive for abdominal distention.   Genitourinary:  Positive for pelvic pain and vaginal bleeding.    Musculoskeletal:  Positive for back pain.   Skin:  Positive for color change.   All other systems reviewed and are negative.      I have reviewed and confirmed the accuracy of the ROS as documented by the MA/LPN/RN Ruddy Spann MD     Past Oncology History:   Oncology/Hematology History    No history exists.        Past Radiation History:  No previous exposures to ionizing radiation therapy and there are not relative contraindications including connective tissue disorder.     Past Medical History:   Past Medical History:   Diagnosis Date   • Abdominal pain    • Abnormal vaginal bleeding    • Anxiety    • Arthritis    • Back pain    • Bilateral swelling of feet    • Cancer 2024    bladder   • Cervical cancer    • COPD (chronic obstructive pulmonary disease)    • Hypertension    • Joint pain    • Muscle pain    • Psychosis    • Seizures     last seizure x2 year ago   • Substance abuse        Past Surgical History:   Past Surgical History:   Procedure Laterality Date   • ANKLE SURGERY     • APPENDECTOMY     • CLAVICLE SURGERY Right    • TONSILLECTOMY     • TRANSURETHRAL RESECTION OF BLADDER TUMOR N/A 5/20/2024    Procedure: CYSTOSCOPY TRANSURETHRAL RESECTION OF BLADDER TUMOR;  Surgeon: Ron Alves MD;  [facility]: [facility];  Service: Urology;  Laterality: N/A;       Family History:   Family History   Problem Relation Age of Onset   • Heart disease Father    • Cancer Mother    • Lung cancer Mother        Social History:   Social History     Socioeconomic History   • Marital status:    Tobacco Use   • Smoking status: Every Day     Current packs/day: 1.50     Types: Cigarettes   • Smokeless tobacco: Never   Vaping Use   • Vaping status: Never Used   Substance and Sexual Activity   • Alcohol use: Not Currently     Comment: denies at this  time.   • Drug use: Defer     Types: Other, Marijuana     Comment: DENIES drug use but on suboxone. States she used xanax   • Sexual activity: Defer     Partners: Male       Medications:     Current Outpatient Medications:   •  buprenorphine-naloxone (SUBOXONE) 8-2 MG per SL tablet, Place 2 tablets under the tongue Daily. Indications: Opioid Use Disorder (Continuation of Therapy), Disp: 6 tablet, Rfl: 0  •  cephalexin (KEFLEX) 500 MG capsule, Take 1 capsule by mouth 2 (Two) Times a Day., Disp: 14 capsule, Rfl: 0  •  FLUoxetine (PROzac) 40 MG capsule, Take 1 capsule by mouth Daily. Indications: Depression, Disp: 30 capsule, Rfl: 0  •  gabapentin (NEURONTIN) 400 MG capsule, Take 1 capsule by mouth 2 (Two) Times a Day., Disp: , Rfl:   •  OLANZapine (zyPREXA) 20 MG tablet, Take 1 tablet by mouth Every Night. Indications: psychosis, Disp: 30 tablet, Rfl: 0  •  oxyCODONE-acetaminophen (Percocet)  MG per tablet, Take 1 tablet by mouth Every 6 (Six) Hours As Needed for Moderate Pain (Pain)., Disp: 10 tablet, Rfl: 0  •  prazosin (MINIPRESS) 2 MG capsule, Take 1 capsule by mouth Every Night. Indications: Frightening Dreams, Disp: 30 capsule, Rfl: 0  •  QUEtiapine (SEROquel) 200 MG tablet, Take 1 tablet by mouth Every Night. Indications: Major Depressive Disorder, Disp: 30 tablet, Rfl: 0  •  traZODone (DESYREL) 100 MG tablet, Take 1 tablet by mouth At Night As Needed for Sleep. Indications: Trouble Sleeping, Disp: 30 tablet, Rfl: 0    Allergies:   Allergies   Allergen Reactions   • Codeine Itching and GI Intolerance       KPS: 90 %     Results Review:              Objective     Physical Exam:  Physical Exam  Vitals and nursing note reviewed.   Constitutional:       Appearance: Normal appearance. She is normal weight.   HENT:      Head: Normocephalic and atraumatic.   Cardiovascular:      Rate and Rhythm: Normal rate.   Pulmonary:      Effort: Pulmonary effort is normal.   Abdominal:      General: Abdomen is flat.       Palpations: Abdomen is soft.   Musculoskeletal:      Cervical back: Normal range of motion.   Neurological:      General: No focal deficit present.      Mental Status: She is alert.   Psychiatric:         Mood and Affect: Mood normal.         Behavior: Behavior normal.         GYN: deferred by the patient; per gyn visit--exam showed necrotic tumor at external os with extension into lower uterine segment and R>L parametrial involvement  Vital Signs: There were no vitals filed for this visit.  There is no height or weight on file to calculate BMI.       Assessment / Plan      Assessment/Plan:   No notes have been filed under this hospital service.  Service: Hospitalist       Likely locally advanced squamous cell carcinoma of the cervix: she still requires full staging workup and has PET/CT ordered. This will help dictate the treatment course. I believe she may be getting set up for EUA as well.  I will also order an MRI of the pelvis to help with staging and for treatment planning purposes. If she is determined to have locally advanced disease, she can be treated with curative intent that would include chemoradiation with radiation consisting of a course of EBRT followed by brachytherapy (this would be completed in [place]). A consideration could be made for possible neoadjuvant chemotherapy prior to chemoradiation but this decision can be solidified once staging is completed. The patient is averse to receiving chemotherapy but I explained the importance of it in the treatment of cervical cancer. She says that she will think about it. We will see her back in follow up once staging is completed and we will schedule CT simulation as warranted at the appropriate time.       Follow Up:   No follow-ups on file.    Ruddy Spann MD   06/05/24   13:02 EDT   [facility] [place]

## 2024-06-06 ENCOUNTER — TELEPHONE (OUTPATIENT)
Dept: GYNECOLOGIC ONCOLOGY | Facility: CLINIC | Age: 54
End: 2024-06-06
Payer: COMMERCIAL

## 2024-06-06 DIAGNOSIS — F11.20 OPIOID USE DISORDER, SEVERE, ON MAINTENANCE THERAPY, DEPENDENCE: ICD-10-CM

## 2024-06-06 DIAGNOSIS — Z72.0 TOBACCO ABUSE: ICD-10-CM

## 2024-06-06 DIAGNOSIS — C53.9 CERVICAL CANCER, FIGO STAGE IIIB: ICD-10-CM

## 2024-06-06 DIAGNOSIS — C53.9 CERVICAL CANCER, FIGO STAGE IIIB: Primary | ICD-10-CM

## 2024-06-06 DIAGNOSIS — F31.63 SEVERE MIXED BIPOLAR 1 DISORDER: ICD-10-CM

## 2024-06-06 DIAGNOSIS — F43.10 POST TRAUMATIC STRESS DISORDER (PTSD): Primary | ICD-10-CM

## 2024-06-06 RX ORDER — OXYCODONE HYDROCHLORIDE 5 MG/1
5 TABLET ORAL EVERY 6 HOURS PRN
Qty: 10 TABLET | Refills: 0 | Status: SHIPPED | OUTPATIENT
Start: 2024-06-06

## 2024-06-06 RX ORDER — ACETAMINOPHEN 325 MG/1
650 TABLET ORAL EVERY 6 HOURS PRN
Qty: 60 TABLET | Refills: 1 | Status: SHIPPED | OUTPATIENT
Start: 2024-06-06

## 2024-06-06 RX ORDER — IBUPROFEN 600 MG/1
600 TABLET ORAL EVERY 6 HOURS PRN
Qty: 30 TABLET | Refills: 1 | Status: SHIPPED | OUTPATIENT
Start: 2024-06-06

## 2024-06-06 NOTE — TELEPHONE ENCOUNTER
Caller: Anna Moore    Relationship: Self    Best call back number: [phone] OR     [phone]       Who are you requesting to speak with (clinical staff, provider,  specific staff member): CLINICAL    What was the call regarding: PT IS CALLING BACK NEEDING PAIN MEDICATION. NOTHING WAS CALLED IN YESTERDAY AND PT IS WANTING TO FOLLOW UP.

## 2024-06-06 NOTE — TELEPHONE ENCOUNTER
Caller: Anna Moore    Relationship: Self    Best call back number: [phone]    Who are you requesting to speak with (clinical staff, provider,  specific staff member): CLINICAL    What was the call regarding: PT CALLING BACK REQUESTING SOMETHING FOR PAIN CALLED INTO [facility]ARM[id number] [address] AT [place] [address] - [phone] PH - [phone]

## 2024-06-06 NOTE — TELEPHONE ENCOUNTER
Called lab to verify receipt of order for additional labs, order printed and refaxed to [phone] with successful receipt

## 2024-06-06 NOTE — TELEPHONE ENCOUNTER
----- Message from Lisbeth ARTEAGA sent at 6/6/2024 10:48 AM EDT -----  Zelda,     Could you call Marlen back about pt PDL1 AND HER2 test. She wanted to make sure does she need to run these on bladder bx on 05/20/2024  Dx says cervical ca     Marlen number is [phone] ext [phone]  She goes to lunch at 11:30 am to 12 ,so you can call after that if needed       Thanks   Hope this make sense!!!

## 2024-06-06 NOTE — TELEPHONE ENCOUNTER
RN spoke with Dr. Pelayo regarding this pt.  She stated that she could call in a short term rx for pain medication but if pt was going to be primarily followed by rad onc, pt would need to seek additional rx's from their office.     RN called pt and she stated that the pain she is having is focused over her ovaries.  Pt stated that the percocet given to her by urology did help and that she stopped her suboxone a week ago.  RN asked if she had an appt with [facility] as pt stated that she was going to do the internal radiation through them and she stated that she did not have that appt yet.  RN stated that if she was not going to be followed by our office for immunotherapy then she would need to call rad onc for additional pain medication to which pt v/u.

## 2024-06-12 ENCOUNTER — HOSPITAL ENCOUNTER (OUTPATIENT)
Dept: PET IMAGING | Facility: HOSPITAL | Age: 54
Discharge: HOME | End: 2024-06-12
Payer: COMMERCIAL

## 2024-06-12 DIAGNOSIS — C53.9 CERVICAL CANCER, FIGO STAGE IIIB: ICD-10-CM

## 2024-06-12 PROCEDURE — A9552 F18 FDG: HCPCS | Performed by: OBSTETRICS & GYNECOLOGY

## 2024-06-12 PROCEDURE — 0 FLUDEOXYGLUCOSE F18 SOLUTION: Performed by: OBSTETRICS & GYNECOLOGY

## 2024-06-12 RX ADMIN — FLUDEOXYGLUCOSE F 18 1 DOSE: 200 INJECTION, SOLUTION INTRAVENOUS at 12:06

## 2024-06-14 ENCOUNTER — HOSPITAL ENCOUNTER (OUTPATIENT)
Dept: MRI IMAGING | Facility: HOSPITAL | Age: 54
Discharge: HOME | End: 2024-06-14
Payer: COMMERCIAL

## 2024-06-14 ENCOUNTER — TELEPHONE (OUTPATIENT)
Dept: RADIATION ONCOLOGY | Facility: HOSPITAL | Age: 54
End: 2024-06-14
Payer: COMMERCIAL

## 2024-06-14 DIAGNOSIS — C53.9 CERVICAL CANCER, FIGO STAGE IIIB: ICD-10-CM

## 2024-06-14 NOTE — TELEPHONE ENCOUNTER
MA tried calling patient after noticing she did not complete PET Scan that was scheduled for 6/12/24. No answer or voicemail available.     Anna's son, Luigi, returned MA's call. He stated that Anna was unable to lay on table with arms up due to severe pain in spine. She has been rescheduled for 6/19/24 at 3:00PM, Luigi has been made aware. MA recommended that patient take Ibuprofen 30 minutes prior to scan to see if that will help alleviate some pain long enough to complete scan. MA also reminded him of her MRI appt scheduled for today at 1:45 PM.

## 2024-06-17 DIAGNOSIS — C53.9 CERVICAL CANCER, FIGO STAGE IIIB: ICD-10-CM

## 2024-06-17 NOTE — TELEPHONE ENCOUNTER
Caller: Anna Moore    Relationship: Self    Best call back number: [phone]    Requested Prescriptions:   Requested Prescriptions     Pending Prescriptions Disp Refills    oxyCODONE (Roxicodone) 5 MG immediate release tablet 10 tablet 0     Sig: Take 1 tablet by mouth Every 6 (Six) Hours As Needed for Moderate Pain.        Pharmacy where request should be sent:      [id number] [address] AT [facility] [address] - [phone] PH - [phone]     Last office visit with prescribing clinician: 6/3/2024   Last telemedicine visit with prescribing clinician: Visit date not found   Next office visit with prescribing clinician: Visit date not found     Additional details provided by patient:     IS COMPLETELY OUT     Does the patient have less than a 3 day supply:  [x] Yes  [] No    Would you like a call back once the refill request has been completed: [] Yes [x] No    If the office needs to give you a call back, can they leave a voicemail: [] Yes [x] No

## 2024-06-18 ENCOUNTER — OFFICE VISIT (OUTPATIENT)
Dept: UROLOGY | Facility: CLINIC | Age: 54
End: 2024-06-18
Payer: COMMERCIAL

## 2024-06-18 VITALS
SYSTOLIC BLOOD PRESSURE: 165 MMHG | DIASTOLIC BLOOD PRESSURE: 82 MMHG | WEIGHT: 158 LBS | HEIGHT: 63 IN | BODY MASS INDEX: 28 KG/M2 | HEART RATE: 105 BPM

## 2024-06-18 DIAGNOSIS — C53.9 CERVICAL CANCER, FIGO STAGE IIIB: Primary | ICD-10-CM

## 2024-06-18 DIAGNOSIS — N32.89 BLADDER MASS: ICD-10-CM

## 2024-06-18 PROCEDURE — 1159F MED LIST DOCD IN RCRD: CPT | Performed by: UROLOGY

## 2024-06-18 PROCEDURE — 1160F RVW MEDS BY RX/DR IN RCRD: CPT | Performed by: UROLOGY

## 2024-06-18 PROCEDURE — 99214 OFFICE O/P EST MOD 30 MIN: CPT | Performed by: UROLOGY

## 2024-06-18 RX ORDER — OXYCODONE AND ACETAMINOPHEN 10; 325 MG/1; MG/1
1 TABLET ORAL EVERY 6 HOURS PRN
Qty: 30 TABLET | Refills: 0 | Status: SHIPPED | OUTPATIENT
Start: 2024-06-18

## 2024-06-18 RX ORDER — OXYCODONE HYDROCHLORIDE 5 MG/1
5 TABLET ORAL EVERY 6 HOURS PRN
Qty: 10 TABLET | Refills: 0 | Status: SHIPPED | OUTPATIENT
Start: 2024-06-18

## 2024-06-18 NOTE — TELEPHONE ENCOUNTER
Caller: Anna Moore    Relationship: Self    Best call back number: [phone]    What is the best time to reach you: ASAP    Who are you requesting to speak with (clinical staff, provider,  specific staff member): CLINICAL    What was the call regarding: PLEASE ADVISE IF MEDICATION IS GOING TO BE SENT TO PHARMACY TODAY.    Is it okay if the provider responds through MyChart: NO

## 2024-06-18 NOTE — TELEPHONE ENCOUNTER
Olga Castro MD  [facility]  I gave patient additional narcotics today.  Given that she is not going to continue follow-up here with me in the short-term while she is undergoing radiation closer to home, it would be more appropriate for her to have her primary care doctor, local radiation oncologist, or local palliative care team take over her pain medicine for her cancer care.  Please call her notify her of this.  I would anticipate future narcotic prescriptions to be filled by someone closer to home.

## 2024-06-18 NOTE — TELEPHONE ENCOUNTER
Pt requesting a refill of pain medication  She has a follow up appt with oncology in [place] on 06/20/24

## 2024-06-18 NOTE — PROGRESS NOTES
Chief Complaint:      Chief Complaint   Patient presents with    Bladder Mass        HPI:   54 y.o. female turns today has stage IIIb cervical cancer refuses chemotherapy she needs radiation ASAP apparently she needed to have a PET scan and nobody would give her pain medication  Flat she has severe disease.  Given 1 additional refill of pain medication neurologically there is nothing else for me to offer her I will see her back on an as-needed basis.    Past Medical History:     Past Medical History:   Diagnosis Date    Abdominal pain     Abnormal vaginal bleeding     Anxiety     Arthritis     Back pain     Bilateral swelling of feet     Cancer 2024    bladder    Cervical cancer     COPD (chronic obstructive pulmonary disease)     Hypertension     Joint pain     Muscle pain     Psychosis     Seizures     last seizure x2 year ago    Substance abuse        Current Meds:     Current Outpatient Medications   Medication Sig Dispense Refill    acetaminophen (Tylenol) 325 MG tablet Take 2 tablets by mouth Every 6 (Six) Hours As Needed for Mild Pain. 60 tablet 1    cephalexin (KEFLEX) 500 MG capsule Take 1 capsule by mouth 2 (Two) Times a Day. 14 capsule 0    FLUoxetine (PROzac) 40 MG capsule Take 1 capsule by mouth Daily. Indications: Depression 30 capsule 0    gabapentin (NEURONTIN) 400 MG capsule Take 1 capsule by mouth 2 (Two) Times a Day.      ibuprofen (ADVIL,MOTRIN) 600 MG tablet Take 1 tablet by mouth Every 6 (Six) Hours As Needed for Mild Pain. 30 tablet 1    OLANZapine (zyPREXA) 20 MG tablet Take 1 tablet by mouth Every Night. Indications: psychosis 30 tablet 0    oxyCODONE (Roxicodone) 5 MG immediate release tablet Take 1 tablet by mouth Every 6 (Six) Hours As Needed for Moderate Pain. 10 tablet 0    oxyCODONE-acetaminophen (Percocet)  MG per tablet Take 1 tablet by mouth Every 6 (Six) Hours As Needed for Moderate Pain (Pain). 10 tablet 0    prazosin (MINIPRESS) 2 MG capsule Take 1 capsule by mouth Every  Night. Indications: Frightening Dreams 30 capsule 0    QUEtiapine (SEROquel) 200 MG tablet Take 1 tablet by mouth Every Night. Indications: Major Depressive Disorder 30 tablet 0    traZODone (DESYREL) 100 MG tablet Take 1 tablet by mouth At Night As Needed for Sleep. Indications: Trouble Sleeping 30 tablet 0     No current facility-administered medications for this visit.        Allergies:      Allergies   Allergen Reactions    Codeine Itching and GI Intolerance        Past Surgical History:     Past Surgical History:   Procedure Laterality Date    ANKLE SURGERY      APPENDECTOMY      CLAVICLE SURGERY Right     TONSILLECTOMY      TRANSURETHRAL RESECTION OF BLADDER TUMOR N/A 5/20/2024    Procedure: CYSTOSCOPY TRANSURETHRAL RESECTION OF BLADDER TUMOR;  Surgeon: Ron Alves MD;  Location: [facility];  Service: Urology;  Laterality: N/A;       Social History:     Social History     Socioeconomic History    Marital status:    Tobacco Use    Smoking status: Every Day     Current packs/day: 1.50     Types: Cigarettes    Smokeless tobacco: Never   Vaping Use    Vaping status: Never Used   Substance and Sexual Activity    Alcohol use: Not Currently     Comment: denies at this time.    Drug use: Defer     Types: Other, Marijuana     Comment: DENIES drug use but on suboxone. States she used xanax    Sexual activity: Defer     Partners: Male       Family History:     Family History   Problem Relation Age of Onset    Heart disease Father     Cancer Mother     Lung cancer Mother        Review of Systems:     Review of Systems   Constitutional: Negative.  Negative for activity change, appetite change, chills, diaphoresis, fatigue and unexpected weight change.   HENT:  Negative for congestion, dental problem, drooling, ear discharge, ear pain, facial swelling, hearing loss, mouth sores, nosebleeds, postnasal drip, rhinorrhea, sinus pressure, sneezing, sore throat, tinnitus, trouble swallowing and voice change.     Eyes: Negative.  Negative for photophobia, pain, discharge, redness, itching and visual disturbance.   Respiratory: Negative.  Negative for apnea, cough, choking, chest tightness, shortness of breath, wheezing and stridor.    Cardiovascular: Negative.  Negative for chest pain, palpitations and leg swelling.   Gastrointestinal: Negative.  Negative for abdominal distention, abdominal pain, anal bleeding, blood in stool, constipation, diarrhea, nausea, rectal pain and vomiting.   Endocrine: Negative.  Negative for cold intolerance, heat intolerance, polydipsia, polyphagia and polyuria.   Musculoskeletal: Negative.  Negative for arthralgias, back pain, gait problem, joint swelling, myalgias, neck pain and neck stiffness.   Skin: Negative.  Negative for color change, pallor, rash and wound.   Allergic/Immunologic: Negative.  Negative for environmental allergies, food allergies and immunocompromised state.   Neurological: Negative.  Negative for dizziness, tremors, seizures, syncope, facial asymmetry, speech difficulty, weakness, light-headedness, numbness and headaches.   Hematological: Negative.  Negative for adenopathy. Does not bruise/bleed easily.   Psychiatric/Behavioral:  Negative for agitation, behavioral problems, confusion, decreased concentration, dysphoric mood, hallucinations, self-injury, sleep disturbance and suicidal ideas. The patient is not nervous/anxious and is not hyperactive.    All other systems reviewed and are negative.      Physical Exam:     Physical Exam  Constitutional:       Appearance: She is well-developed.   HENT:      Head: Normocephalic and atraumatic.      Right Ear: External ear normal.      Left Ear: External ear normal.   Eyes:      Conjunctiva/sclera: Conjunctivae normal.      Pupils: Pupils are equal, round, and reactive to light.   Cardiovascular:      Rate and Rhythm: Normal rate and regular rhythm.      Heart sounds: Normal heart sounds.   Pulmonary:      Effort: Pulmonary  effort is normal.      Breath sounds: Normal breath sounds.   Abdominal:      General: Bowel sounds are normal. There is no distension.      Palpations: Abdomen is soft. There is no mass.      Tenderness: There is no abdominal tenderness. There is no guarding or rebound.   Genitourinary:     Vagina: No vaginal discharge.   Musculoskeletal:         General: Normal range of motion.   Skin:     General: Skin is warm and dry.   Neurological:      Mental Status: She is alert.      Deep Tendon Reflexes: Reflexes are normal and symmetric.   Psychiatric:         Behavior: Behavior normal.         Thought Content: Thought content normal.         Judgment: Judgment normal.         I have reviewed the following portions of the patient's history: Allergies, current medications, past family history, past medical history, past social history, past surgical history, problem list, and ROS and confirm it is accurate.    Recent Image (CT and/or KUB):      CT Abdomen and Pelvis: No results found for this or any previous visit.       CT Stone Protocol: No results found for this or any previous visit.       KUB: No results found for this or any previous visit.       Labs (past 3 months):      Admission on 05/20/2024, Discharged on 05/20/2024   Component Date Value Ref Range Status    HCG, Urine, QL 05/20/2024 Negative  Negative Final    Lot Number 05/20/2024 #[id number]   Final    Internal Positive Control 05/20/2024 Passed  Positive, Passed Final    Internal Negative Control 05/20/2024 Passed  Negative, Passed Final    Expiration Date 05/20/2024 04/11/2025   Final    Reference Lab Report 05/20/2024    Final                    Value:[facility]  [address] [place] [address]  Phone: [phone] or [phone]  Fax: [phone]  Juventino Pride M.D., Medical Director    PATIENT NAME                           LABORATORY NO.  [id number]  MARY BETH SOLIS                           [id number]  [id number]           "               AGE              SEX  SSN           CLIENT REF #  [facility] [place]              54      1970  F    [id number]   [id number]    [address]                     REQUESTING MRONNY.     ATTENDING M.D.     [place]  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  Overlying urothelium is intact and  benign in appearance.  Review of the operative note in the electronic medical  record reveals an impression of \"right submucosal tumor consistent with cervical  cancer\".  In light of this information, immunohistochemical staining is  performed on block A1 to further evaluate the tumor.  The neoplastic cells are  diffusely positive for p16 and largely negative for GATA3.  The GATA3 stain  highlights intact benign urothelium.  The overall findings would support  gynecologic origin of the tumor.  Clinical correlation is essential.    Representative tumor block: A1    CLINICAL HISTORY:  Bladder mass    SPECIMENS RECEIVED:  URINARY BLADDER, TUR    MICROSCOPIC DESCRIPTION:  Tissue blocks are prepared and slides are examined microscopically. See  diagnosis for details.    The internal and external (both positive and negative) controls reacted  appropriately. Some of our immunohistochemical and in situ hybridization  studies are performed as                           analyte specific reagents. The following statement  applies to those tests: This test was developed, and its performance  characteristics determined by [facility]. It has not been  cleared or approved " "by the US Food and Drug Administration. However, the  FDA has determined that approval and clearance are not necessary.    Professional interpretation rendered by Clementina Casillas M.D., EVAN at  [facility], [address] [place] [id number].    GROSS DESCRIPTION:  Labeled as \"bladder tumor biopsy\", consisting of multiple pieces of tan-pink  bladder TUR weighing less than 1 g and measuring 1.5 x 0.8 x 0.2 cm in  aggregate, submitted entirely in 1 cassette.  SOG    REVIEWED, DIAGNOSED AND ELECTRONICALLY  SIGNED BY:    Clementina Casillas M.D., EVAN  CPT CODES:  33685, 53683, 62089     Admission on 05/14/2024, Discharged on 05/14/2024   Component Date Value Ref Range Status    Glucose 05/14/2024 119 (H)  65 - 99 mg/dL Final    BUN 05/14/2024 5 (L)  6 - 20 mg/dL Final    Creatinine 05/14/2024 0.84  0.57 - 1.00 mg/dL Final    Sodium 05/14/2024 139  136 - 145 mmol/L Final    Potassium 05/14/2024 3.5  3.5 - 5.2 mmol/L Final    Chloride 05/14/2024 99  98 - 107 mmol/L Final    CO2 05/14/2024 28.1  22.0 - 29.0 mmol/L Final    Calcium 05/14/2024 9.5  8.6 - 10.5 mg/dL Final    Total Protein 05/14/2024 8.3  6.0 - 8.5 g/dL Final    Albumin 05/14/2024 4.1  3.5 - 5.2 g/dL Final    ALT (SGPT) 05/14/2024 12  1 - 33 U/L Final    AST (SGOT) 05/14/2024 18  1 - 32 U/L Final    Alkaline Phosphatase 05/14/2024 99  39 - 117 U/L Final    Total Bilirubin 05/14/2024 0.2  0.0 - 1.2 mg/dL Final    Globulin 05/14/2024 4.2  gm/dL Final    A/G Ratio 05/14/2024 1.0  g/dL Final    BUN/Creatinine Ratio 05/14/2024 6.0 (L)  7.0 - 25.0 Final    Anion Gap 05/14/2024 11.9  5.0 - 15.0 mmol/L Final    eGFR 05/14/2024 83.2  >60.0 mL/min/1.73 Final    Protime 05/14/2024 12.2  12.1 - 14.7 Seconds Final    INR 05/14/2024 0.89 (L)  0.90 - 1.10 Final    PTT 05/14/2024 28.6  26.5 - 34.5 seconds Final    HCG Qualitative 05/14/2024 Negative  Negative Final    proBNP 05/14/2024 354.8  0.0 - 900.0 pg/mL Final    Blood Culture 05/14/2024 No growth at 5 days "   Final    Blood Culture 05/14/2024 No growth at 5 days   Final    Lactate 05/14/2024 1.6  0.5 - 2.0 mmol/L Final    Procalcitonin 05/14/2024 0.14  0.00 - 0.25 ng/mL Final    Sed Rate 05/14/2024 45 (H)  0 - 30 mm/hr Final    C-Reactive Protein 05/14/2024 0.42  0.00 - 0.50 mg/dL Final    Creatine Kinase 05/14/2024 81  20 - 180 U/L Final    Magnesium 05/14/2024 1.8  1.6 - 2.6 mg/dL Final    TSH 05/14/2024 1.160  0.270 - 4.200 uIU/mL Final    Free T4 05/14/2024 1.17  0.93 - 1.70 ng/dL Final    WBC 05/14/2024 10.61  3.40 - 10.80 10*3/mm3 Final    RBC 05/14/2024 4.33  3.77 - 5.28 10*6/mm3 Final    Hemoglobin 05/14/2024 13.2  12.0 - 15.9 g/dL Final    Hematocrit 05/14/2024 40.6  34.0 - 46.6 % Final    MCV 05/14/2024 93.8  79.0 - 97.0 fL Final    MCH 05/14/2024 30.5  26.6 - 33.0 pg Final    MCHC 05/14/2024 32.5  31.5 - 35.7 g/dL Final    RDW 05/14/2024 13.1  12.3 - 15.4 % Final    RDW-SD 05/14/2024 44.9  37.0 - 54.0 fl Final    MPV 05/14/2024 9.8  6.0 - 12.0 fL Final    Platelets 05/14/2024 339  140 - 450 10*3/mm3 Final    Neutrophil % 05/14/2024 65.8  42.7 - 76.0 % Final    Lymphocyte % 05/14/2024 19.1 (L)  19.6 - 45.3 % Final    Monocyte % 05/14/2024 10.0  5.0 - 12.0 % Final    Eosinophil % 05/14/2024 3.9  0.3 - 6.2 % Final    Basophil % 05/14/2024 0.9  0.0 - 1.5 % Final    Immature Grans % 05/14/2024 0.3  0.0 - 0.5 % Final    Neutrophils, Absolute 05/14/2024 6.98  1.70 - 7.00 10*3/mm3 Final    Lymphocytes, Absolute 05/14/2024 2.03  0.70 - 3.10 10*3/mm3 Final    Monocytes, Absolute 05/14/2024 1.06 (H)  0.10 - 0.90 10*3/mm3 Final    Eosinophils, Absolute 05/14/2024 0.41 (H)  0.00 - 0.40 10*3/mm3 Final    Basophils, Absolute 05/14/2024 0.10  0.00 - 0.20 10*3/mm3 Final    Immature Grans, Absolute 05/14/2024 0.03  0.00 - 0.05 10*3/mm3 Final    nRBC 05/14/2024 0.0  0.0 - 0.2 /100 WBC Final    Extra Tube 05/14/2024 Hold for add-ons.   Final    Auto resulted.    Extra Tube 05/14/2024 hold for add-on   Final    Auto resulted     Extra Tube 05/14/2024 Hold for add-ons.   Final    Auto resulted   Admission on 04/26/2024, Discharged on 04/26/2024   Component Date Value Ref Range Status    Glucose 04/26/2024 114 (H)  65 - 99 mg/dL Final    BUN 04/26/2024 4 (L)  6 - 20 mg/dL Final    Creatinine 04/26/2024 0.82  0.57 - 1.00 mg/dL Final    Sodium 04/26/2024 136  136 - 145 mmol/L Final    Potassium 04/26/2024 3.5  3.5 - 5.2 mmol/L Final    Chloride 04/26/2024 98  98 - 107 mmol/L Final    CO2 04/26/2024 24.2  22.0 - 29.0 mmol/L Final    Calcium 04/26/2024 9.4  8.6 - 10.5 mg/dL Final    Total Protein 04/26/2024 8.1  6.0 - 8.5 g/dL Final    Albumin 04/26/2024 4.1  3.5 - 5.2 g/dL Final    ALT (SGPT) 04/26/2024 13  1 - 33 U/L Final    AST (SGOT) 04/26/2024 17  1 - 32 U/L Final    Alkaline Phosphatase 04/26/2024 86  39 - 117 U/L Final    Total Bilirubin 04/26/2024 0.3  0.0 - 1.2 mg/dL Final    Globulin 04/26/2024 4.0  gm/dL Final    A/G Ratio 04/26/2024 1.0  g/dL Final    BUN/Creatinine Ratio 04/26/2024 4.9 (L)  7.0 - 25.0 Final    Anion Gap 04/26/2024 13.8  5.0 - 15.0 mmol/L Final    eGFR 04/26/2024 85.7  >60.0 mL/min/1.73 Final    Color, UA 04/26/2024 Yellow  Yellow, Straw Final    Appearance, UA 04/26/2024 Clear  Clear Final    pH, UA 04/26/2024 7.0  5.0 - 8.0 Final    Specific Gravity, UA 04/26/2024 1.008  1.005 - 1.030 Final    Glucose, UA 04/26/2024 Negative  Negative Final    Ketones, UA 04/26/2024 Negative  Negative Final    Bilirubin, UA 04/26/2024 Negative  Negative Final    Blood, UA 04/26/2024 Trace (A)  Negative Final    Protein, UA 04/26/2024 Negative  Negative Final    Leuk Esterase, UA 04/26/2024 Moderate (2+) (A)  Negative Final    Nitrite, UA 04/26/2024 Positive (A)  Negative Final    Urobilinogen, UA 04/26/2024 1.0 E.U./dL  0.2 - 1.0 E.U./dL Final    THC, Screen, Urine 04/26/2024 Negative  Negative Final    Phencyclidine (PCP), Urine 04/26/2024 Negative  Negative Final    Cocaine Screen, Urine 04/26/2024 Negative  Negative Final     Methamphetamine, Ur 04/26/2024 Negative  Negative Final    Opiate Screen 04/26/2024 Negative  Negative Final    Amphetamine Screen, Urine 04/26/2024 Negative  Negative Final    Benzodiazepine Screen, Urine 04/26/2024 Negative  Negative Final    Tricyclic Antidepressants Screen 04/26/2024 Negative  Negative Final    Methadone Screen, Urine 04/26/2024 Negative  Negative Final    Barbiturates Screen, Urine 04/26/2024 Negative  Negative Final    Oxycodone Screen, Urine 04/26/2024 Negative  Negative Final    Buprenorphine, Screen, Urine 04/26/2024 Positive (A)  Negative Final    Magnesium 04/26/2024 1.8  1.6 - 2.6 mg/dL Final    HCG, Urine QL 04/26/2024 Negative  Negative Final    Ethanol 04/26/2024 <10  0 - 10 mg/dL Final    Ethanol % 04/26/2024 <0.010  % Final    WBC 04/26/2024 10.99 (H)  3.40 - 10.80 10*3/mm3 Final    RBC 04/26/2024 4.15  3.77 - 5.28 10*6/mm3 Final    Hemoglobin 04/26/2024 12.8  12.0 - 15.9 g/dL Final    Hematocrit 04/26/2024 39.0  34.0 - 46.6 % Final    MCV 04/26/2024 94.0  79.0 - 97.0 fL Final    MCH 04/26/2024 30.8  26.6 - 33.0 pg Final    MCHC 04/26/2024 32.8  31.5 - 35.7 g/dL Final    RDW 04/26/2024 13.1  12.3 - 15.4 % Final    RDW-SD 04/26/2024 44.9  37.0 - 54.0 fl Final    MPV 04/26/2024 10.0  6.0 - 12.0 fL Final    Platelets 04/26/2024 287  140 - 450 10*3/mm3 Final    Neutrophil % 04/26/2024 63.2  42.7 - 76.0 % Final    Lymphocyte % 04/26/2024 24.2  19.6 - 45.3 % Final    Monocyte % 04/26/2024 9.8  5.0 - 12.0 % Final    Eosinophil % 04/26/2024 1.5  0.3 - 6.2 % Final    Basophil % 04/26/2024 1.0  0.0 - 1.5 % Final    Immature Grans % 04/26/2024 0.3  0.0 - 0.5 % Final    Neutrophils, Absolute 04/26/2024 6.94  1.70 - 7.00 10*3/mm3 Final    Lymphocytes, Absolute 04/26/2024 2.66  0.70 - 3.10 10*3/mm3 Final    Monocytes, Absolute 04/26/2024 1.08 (H)  0.10 - 0.90 10*3/mm3 Final    Eosinophils, Absolute 04/26/2024 0.17  0.00 - 0.40 10*3/mm3 Final    Basophils, Absolute 04/26/2024 0.11  0.00 - 0.20  10*3/mm3 Final    Immature Grans, Absolute 04/26/2024 0.03  0.00 - 0.05 10*3/mm3 Final    nRBC 04/26/2024 0.0  0.0 - 0.2 /100 WBC Final    Extra Tube 04/26/2024 Hold for add-ons.   Final    Auto resulted.    Extra Tube 04/26/2024 hold for add-on   Final    Auto resulted    Extra Tube 04/26/2024 Hold for add-ons.   Final    Auto resulted.    Extra Tube 04/26/2024 Hold for add-ons.   Final    Auto resulted    Fentanyl, Urine 04/26/2024 Negative  Negative Final    RBC, UA 04/26/2024 3-5 (A)  None Seen, 0-2 /HPF Final    WBC, UA 04/26/2024 21-50 (A)  None Seen, 0-2 /HPF Final    Bacteria, UA 04/26/2024 4+ (A)  None Seen /HPF Final    Squamous Epithelial Cells, UA 04/26/2024 0-2  None Seen, 0-2 /HPF Final    Hyaline Casts, UA 04/26/2024 None Seen  None Seen /LPF Final    Methodology 04/26/2024 Automated Microscopy   Final    Extra Tube 04/26/2024 Hold for add-ons.   Final    Auto resulted.    Urine Culture 04/26/2024 >100,000 CFU/mL Escherichia coli (A)   Final        Procedure:       Assessment/Plan:   Stage IIIb cervical cancer undergoing radiation see back here on an as-needed basis.  Narcotic pain medication-patient has significant acute pain that I believe would be an indication for the use of narcotic pain medication.  I discussed the significant risks of pain medication and the fact that this will be a short only option and I will give her no more than a three-day supply of pain medication, I will not plan long-term medication, and that this will be sent to a pain clinic if it at all becomes necessary.  We discussed signing a pain medication agreement and the fact that we're going to run a [facility] review to be sure the patient is not getting pain medication from elsewhere.  If this is the case, we will not give pain medication as part of the patient's treatment plan of there being prescribed a controlled substance with potential for abuse.  This patient has been well aware of the appropriate dose of such  medications including the risks for somnolence, limited ability to drive and/or safety and the significant potential for overdose.  It has been made clear that these medications are for the prescribed patient only without concomitant use of alcohol or other substance unless prescribed by the medical provider.  Has completed prescribing agreement detailing the terms of continue prescribing him a controlled substance including monitoring Jesusita reports, the possibility of urine drug screens, and pill counts.  The patient is aware that we review JESUSITA reports on a regular basis and scan them into the chart.  History and physical examination exhibited continued safe and appropriate use of controlled substances. We also discussed the fact that the new [place] legislation allows only a three-day prescription for pain medication.  In this situation he will be referred to a chronic pain clinic.            This document has been electronically signed by JO ANN GOMEZ MD June 18, 2024 13:27 EDT    Dictated Utilizing Dragon Dictation: Part of this note may be an electronic transcription/translation of spoken language to printed text using the Dragon Dictation System.

## 2024-06-19 ENCOUNTER — TELEPHONE (OUTPATIENT)
Dept: RADIATION ONCOLOGY | Facility: HOSPITAL | Age: 54
End: 2024-06-19
Payer: COMMERCIAL

## 2024-06-19 NOTE — TELEPHONE ENCOUNTER
MA spoke with Anna about her appointments. MA explained that we needed to cancel her follow-up until she is able to complete both scans. Patient stated that she isn't able to lie flat to complete them. MA explained that she would be lying for Radiation Therapy as well and how important it was that we complete MRI and PET if at all possible. MA instructed her to take Ibuprofen or her pain medication 30 minutes prior to her imaging appointments to see if that would help her complete them. Patient agreed to try. MA transferred patient to [facility] Scheduling so she could get rescheduled. MA will call patient back with new follow-up appointment at completion.

## 2024-06-19 NOTE — TELEPHONE ENCOUNTER
MA tried calling patient to inform her that we have cancelled her follow-up appointment for 6/20/24 due to not completing her MRI and PET. Tried calling her mobile number but she was unable to hear anything. Tried a second time and no answer. Called her emergency contact, Haja, and informed him of change and that we need her to R/S her scans before we follow-up with her. He expressed understanding and said he would get in touch with her so she can get rescheduled.

## 2024-06-26 ENCOUNTER — HOSPITAL ENCOUNTER (OUTPATIENT)
Dept: PET IMAGING | Facility: HOSPITAL | Age: 54
Discharge: HOME | End: 2024-06-26
Payer: COMMERCIAL

## 2024-06-26 PROCEDURE — A9552 F18 FDG: HCPCS | Performed by: OBSTETRICS & GYNECOLOGY

## 2024-06-26 PROCEDURE — 0 FLUDEOXYGLUCOSE F18 SOLUTION: Performed by: OBSTETRICS & GYNECOLOGY

## 2024-06-26 RX ADMIN — FLUDEOXYGLUCOSE F 18 1 DOSE: 200 INJECTION, SOLUTION INTRAVENOUS at 14:23

## 2024-06-28 DIAGNOSIS — C53.9 CERVICAL CANCER, FIGO STAGE IIIB: ICD-10-CM

## 2024-06-28 RX ORDER — OXYCODONE HYDROCHLORIDE 5 MG/1
5 TABLET ORAL EVERY 6 HOURS PRN
Qty: 10 TABLET | Refills: 0 | Status: CANCELLED | OUTPATIENT
Start: 2024-06-28

## 2024-06-28 NOTE — TELEPHONE ENCOUNTER
Caller: Anna Moore    Relationship: Self    Best call back number: [phone]     What is the best time to reach you: ASAP    Who are you requesting to speak with (clinical staff, provider,  specific staff member): CLINICAL    What was the call regarding: PT IS HURTING AND PT IS CALLING BACK TO CHECK ON THIS TO SEE WHEN THIS IS GOING TO BE SENT IN. PLEASE ADVISE PT.     PT ALSO CALLED ABOUT ANOTHER RX AND IS CHECKING ON THAT ALSO.

## 2024-06-28 NOTE — TELEPHONE ENCOUNTER
Caller: Anna Moore    Relationship: Self    Best call back number: [phone]     Requested Prescriptions:   Requested Prescriptions     Pending Prescriptions Disp Refills    oxyCODONE (Roxicodone) 5 MG immediate release tablet 10 tablet 0     Sig: Take 1 tablet by mouth Every 6 (Six) Hours As Needed for Moderate Pain.        Pharmacy where request should be sent: [id number] [address] - [phone] PH - [phone] FX     Last office visit with prescribing clinician: 6/3/2024   Last telemedicine visit with prescribing clinician: Visit date not found   Next office visit with prescribing clinician: Visit date not found     Additional details provided by patient: PT RAN OUT OF MEDICATION YESTERDAY    Does the patient have less than a 3 day supply:  [x] Yes  [] No    Would you like a call back once the refill request has been completed: [] Yes [x] No    If the office needs to give you a call back, can they leave a voicemail: [] Yes [x] No

## 2024-07-01 ENCOUNTER — TELEPHONE (OUTPATIENT)
Dept: GYNECOLOGIC ONCOLOGY | Facility: CLINIC | Age: 54
End: 2024-07-01

## 2024-07-01 DIAGNOSIS — F41.9 ACUTE ANXIETY: ICD-10-CM

## 2024-07-01 DIAGNOSIS — F43.10 POST TRAUMATIC STRESS DISORDER (PTSD): Primary | ICD-10-CM

## 2024-07-01 RX ORDER — LORAZEPAM 1 MG/1
1 TABLET ORAL ONCE
Qty: 1 TABLET | Refills: 0 | Status: SHIPPED | OUTPATIENT
Start: 2024-07-01 | End: 2024-07-01

## 2024-07-01 NOTE — TELEPHONE ENCOUNTER
Caller: Anna Moore    Relationship: Self    Best call back number: [phone]      What was the call regarding: PATIENT CALLED WANTED TO LET DR HOPE KNOW THAT SHE CAN'T LAY IN THE MACHINE FOR THE MRI OR THE PET SCAN UNLESS THEY GIVE HER A MEDICATION TO HELP HER

## 2024-07-17 ENCOUNTER — HOSPITAL ENCOUNTER (OUTPATIENT)
Dept: PET IMAGING | Facility: HOSPITAL | Age: 54
Discharge: HOME | End: 2024-07-17
Payer: COMMERCIAL

## 2024-07-17 PROCEDURE — A9552 F18 FDG: HCPCS | Performed by: OBSTETRICS & GYNECOLOGY

## 2024-07-17 PROCEDURE — 0 FLUDEOXYGLUCOSE F18 SOLUTION: Performed by: OBSTETRICS & GYNECOLOGY

## 2024-07-17 RX ADMIN — FLUDEOXYGLUCOSE F 18 1 DOSE: 200 INJECTION, SOLUTION INTRAVENOUS at 08:43

## 2024-07-23 ENCOUNTER — TELEPHONE (OUTPATIENT)
Dept: ONCOLOGY | Facility: CLINIC | Age: 54
End: 2024-07-23
Payer: COMMERCIAL

## 2024-07-23 NOTE — TELEPHONE ENCOUNTER
Caller: Anna Moore    Relationship to patient: Self    Best call back number: [phone]    Chief complaint: PT CALLED TO RESCHEDULE HER APPOINTMENT SHE MISSED    Type of visit: LAB AND NEW    Requested date: MORNINGS     If rescheduling, when is the original appointment: 7-22-24

## 2024-07-24 ENCOUNTER — HOSPITAL ENCOUNTER (EMERGENCY)
Facility: HOSPITAL | Age: 54
Discharge: HOME | End: 2024-07-24
Attending: STUDENT IN AN ORGANIZED HEALTH CARE EDUCATION/TRAINING PROGRAM
Payer: COMMERCIAL

## 2024-07-24 ENCOUNTER — APPOINTMENT (OUTPATIENT)
Dept: CT IMAGING | Facility: HOSPITAL | Age: 54
End: 2024-07-24
Payer: COMMERCIAL

## 2024-07-24 VITALS
HEIGHT: 63 IN | BODY MASS INDEX: 26.58 KG/M2 | WEIGHT: 150 LBS | RESPIRATION RATE: 18 BRPM | TEMPERATURE: 97.9 F | DIASTOLIC BLOOD PRESSURE: 79 MMHG | OXYGEN SATURATION: 96 % | SYSTOLIC BLOOD PRESSURE: 177 MMHG | HEART RATE: 106 BPM

## 2024-07-24 DIAGNOSIS — N39.0 ACUTE UTI: Primary | ICD-10-CM

## 2024-07-24 DIAGNOSIS — N89.8 VAGINAL DISCHARGE: ICD-10-CM

## 2024-07-24 LAB
ALBUMIN SERPL-MCNC: 4.1 G/DL (ref 3.5–5.2)
ALBUMIN/GLOB SERPL: 0.9 G/DL
ALP SERPL-CCNC: 105 U/L (ref 39–117)
ALT SERPL W P-5'-P-CCNC: 9 U/L (ref 1–33)
AMPHET+METHAMPHET UR QL: NEGATIVE
AMPHETAMINES UR QL: NEGATIVE
ANION GAP SERPL CALCULATED.3IONS-SCNC: 12.1 MMOL/L (ref 5–15)
AST SERPL-CCNC: 13 U/L (ref 1–32)
BACTERIA UR QL AUTO: ABNORMAL /HPF
BARBITURATES UR QL SCN: NEGATIVE
BASOPHILS # BLD AUTO: 0.12 10*3/MM3 (ref 0–0.2)
BASOPHILS NFR BLD AUTO: 0.9 % (ref 0–1.5)
BENZODIAZ UR QL SCN: NEGATIVE
BILIRUB SERPL-MCNC: 0.2 MG/DL (ref 0–1.2)
BILIRUB UR QL STRIP: NEGATIVE
BUN SERPL-MCNC: 5 MG/DL (ref 6–20)
BUN/CREAT SERPL: 5.5 (ref 7–25)
BUPRENORPHINE SERPL-MCNC: POSITIVE NG/ML
CALCIUM SPEC-SCNC: 10.1 MG/DL (ref 8.6–10.5)
CANNABINOIDS SERPL QL: NEGATIVE
CHLORIDE SERPL-SCNC: 95 MMOL/L (ref 98–107)
CLARITY UR: ABNORMAL
CO2 SERPL-SCNC: 26.9 MMOL/L (ref 22–29)
COCAINE UR QL: NEGATIVE
COLOR UR: YELLOW
CREAT SERPL-MCNC: 0.91 MG/DL (ref 0.57–1)
CRP SERPL-MCNC: 0.82 MG/DL (ref 0–0.5)
DEPRECATED RDW RBC AUTO: 47.3 FL (ref 37–54)
EGFRCR SERPLBLD CKD-EPI 2021: 75.6 ML/MIN/1.73
EOSINOPHIL # BLD AUTO: 0.31 10*3/MM3 (ref 0–0.4)
EOSINOPHIL NFR BLD AUTO: 2.4 % (ref 0.3–6.2)
ERYTHROCYTE [DISTWIDTH] IN BLOOD BY AUTOMATED COUNT: 13.8 % (ref 12.3–15.4)
FENTANYL UR-MCNC: NEGATIVE NG/ML
GLOBULIN UR ELPH-MCNC: 4.8 GM/DL
GLUCOSE SERPL-MCNC: 114 MG/DL (ref 65–99)
GLUCOSE UR STRIP-MCNC: NEGATIVE MG/DL
HCT VFR BLD AUTO: 41 % (ref 34–46.6)
HGB BLD-MCNC: 13.2 G/DL (ref 12–15.9)
HGB UR QL STRIP.AUTO: ABNORMAL
HOLD SPECIMEN: NORMAL
HYALINE CASTS UR QL AUTO: ABNORMAL /LPF
IMM GRANULOCYTES # BLD AUTO: 0.04 10*3/MM3 (ref 0–0.05)
IMM GRANULOCYTES NFR BLD AUTO: 0.3 % (ref 0–0.5)
KETONES UR QL STRIP: NEGATIVE
LEUKOCYTE ESTERASE UR QL STRIP.AUTO: ABNORMAL
LYMPHOCYTES # BLD AUTO: 2.64 10*3/MM3 (ref 0.7–3.1)
LYMPHOCYTES NFR BLD AUTO: 20.8 % (ref 19.6–45.3)
MCH RBC QN AUTO: 29.7 PG (ref 26.6–33)
MCHC RBC AUTO-ENTMCNC: 32.2 G/DL (ref 31.5–35.7)
MCV RBC AUTO: 92.1 FL (ref 79–97)
METHADONE UR QL SCN: NEGATIVE
MONOCYTES # BLD AUTO: 1.43 10*3/MM3 (ref 0.1–0.9)
MONOCYTES NFR BLD AUTO: 11.3 % (ref 5–12)
NEUTROPHILS NFR BLD AUTO: 64.3 % (ref 42.7–76)
NEUTROPHILS NFR BLD AUTO: 8.17 10*3/MM3 (ref 1.7–7)
NITRITE UR QL STRIP: NEGATIVE
NRBC BLD AUTO-RTO: 0 /100 WBC (ref 0–0.2)
OPIATES UR QL: NEGATIVE
OXYCODONE UR QL SCN: NEGATIVE
PCP UR QL SCN: NEGATIVE
PH UR STRIP.AUTO: 6.5 [PH] (ref 5–8)
PLATELET # BLD AUTO: 358 10*3/MM3 (ref 140–450)
PMV BLD AUTO: 9.4 FL (ref 6–12)
POTASSIUM SERPL-SCNC: 4 MMOL/L (ref 3.5–5.2)
PROT SERPL-MCNC: 8.9 G/DL (ref 6–8.5)
PROT UR QL STRIP: ABNORMAL
RBC # BLD AUTO: 4.45 10*6/MM3 (ref 3.77–5.28)
RBC # UR STRIP: ABNORMAL /HPF
REF LAB TEST METHOD: ABNORMAL
SODIUM SERPL-SCNC: 134 MMOL/L (ref 136–145)
SP GR UR STRIP: 1.01 (ref 1–1.03)
SQUAMOUS #/AREA URNS HPF: ABNORMAL /HPF
TRICYCLICS UR QL SCN: POSITIVE
UROBILINOGEN UR QL STRIP: ABNORMAL
WBC # UR STRIP: ABNORMAL /HPF
WBC NRBC COR # BLD AUTO: 12.71 10*3/MM3 (ref 3.4–10.8)
WHOLE BLOOD HOLD COAG: NORMAL
WHOLE BLOOD HOLD SPECIMEN: NORMAL

## 2024-07-24 PROCEDURE — 74176 CT ABD & PELVIS W/O CONTRAST: CPT

## 2024-07-24 PROCEDURE — 99284 EMERGENCY DEPT VISIT MOD MDM: CPT

## 2024-07-24 PROCEDURE — 25010000002 CEFTRIAXONE PER 250 MG: Performed by: NURSE PRACTITIONER

## 2024-07-24 PROCEDURE — 80053 COMPREHEN METABOLIC PANEL: CPT | Performed by: NURSE PRACTITIONER

## 2024-07-24 PROCEDURE — 74176 CT ABD & PELVIS W/O CONTRAST: CPT | Performed by: RADIOLOGY

## 2024-07-24 PROCEDURE — 96372 THER/PROPH/DIAG INJ SC/IM: CPT

## 2024-07-24 PROCEDURE — 85025 COMPLETE CBC W/AUTO DIFF WBC: CPT | Performed by: NURSE PRACTITIONER

## 2024-07-24 PROCEDURE — 81001 URINALYSIS AUTO W/SCOPE: CPT | Performed by: NURSE PRACTITIONER

## 2024-07-24 PROCEDURE — 36415 COLL VENOUS BLD VENIPUNCTURE: CPT

## 2024-07-24 PROCEDURE — 80307 DRUG TEST PRSMV CHEM ANLYZR: CPT | Performed by: NURSE PRACTITIONER

## 2024-07-24 PROCEDURE — 87086 URINE CULTURE/COLONY COUNT: CPT | Performed by: NURSE PRACTITIONER

## 2024-07-24 PROCEDURE — 86140 C-REACTIVE PROTEIN: CPT | Performed by: NURSE PRACTITIONER

## 2024-07-24 PROCEDURE — 0 LIDOCAINE 1 % SOLUTION 2 ML VIAL: Performed by: NURSE PRACTITIONER

## 2024-07-24 RX ORDER — HYDROCODONE BITARTRATE AND ACETAMINOPHEN 7.5; 325 MG/1; MG/1
1 TABLET ORAL ONCE
Status: COMPLETED | OUTPATIENT
Start: 2024-07-24 | End: 2024-07-24

## 2024-07-24 RX ORDER — CEFDINIR 300 MG/1
300 CAPSULE ORAL 2 TIMES DAILY
Qty: 14 CAPSULE | Refills: 0 | Status: SHIPPED | OUTPATIENT
Start: 2024-07-24 | End: 2024-07-31

## 2024-07-24 RX ORDER — METRONIDAZOLE 500 MG/1
500 TABLET ORAL 2 TIMES DAILY
Qty: 14 TABLET | Refills: 0 | Status: SHIPPED | OUTPATIENT
Start: 2024-07-24 | End: 2024-07-31

## 2024-07-24 RX ADMIN — LIDOCAINE HYDROCHLORIDE 1 G: 10 INJECTION, SOLUTION EPIDURAL; INFILTRATION; INTRACAUDAL; PERINEURAL at 12:18

## 2024-07-24 RX ADMIN — HYDROCODONE BITARTRATE AND ACETAMINOPHEN 1 TABLET: 7.5; 325 TABLET ORAL at 10:25

## 2024-07-24 NOTE — ED NOTES
MEDICAL SCREENING:    Reason for Visit: Dysuria    Patient initially seen in triage.  The patient was advised further evaluation and diagnostic testing will be needed, some of the treatment and testing will be initiated in the lobby in order to begin the process.  The patient will be returned to the waiting area for the time being and possibly be re-assessed by a subsequent ED provider.  The patient will be brought back to the treatment area in as timely manner as possible.      Joanna Reich, APRN  07/24/24 0916

## 2024-07-24 NOTE — ED PROVIDER NOTES
Subjective   History of Present Illness  Patient is a 54-year-old female with past medical history significant for cervical cancer, COPD, hypertension, psychosis, substance abuse, arthritis, anxiety and depression.  She presents to the ED today with complaints of vaginal discharge and dysuria.  She denies any fever or abdominal pain.  Denies any other significant complaints.        Review of Systems   Constitutional: Negative.  Negative for fever.   HENT: Negative.     Eyes: Negative.    Respiratory: Negative.     Cardiovascular: Negative.  Negative for chest pain.   Gastrointestinal: Negative.  Negative for abdominal pain.   Endocrine: Negative.    Genitourinary:  Positive for dysuria and vaginal discharge.   Musculoskeletal: Negative.    Skin: Negative.    Allergic/Immunologic: Negative.    Neurological: Negative.    Hematological: Negative.    Psychiatric/Behavioral: Negative.     All other systems reviewed and are negative.      Past Medical History:   Diagnosis Date    Abdominal pain     Abnormal vaginal bleeding     Anxiety     Arthritis     Back pain     Bilateral swelling of feet     Cancer 2024    bladder    Cervical cancer     COPD (chronic obstructive pulmonary disease)     Hypertension     Joint pain     Muscle pain     Psychosis     Seizures     last seizure x2 year ago    Substance abuse        Allergies   Allergen Reactions    Codeine Itching and GI Intolerance       Past Surgical History:   Procedure Laterality Date    ANKLE SURGERY      APPENDECTOMY      CLAVICLE SURGERY Right     TONSILLECTOMY      TRANSURETHRAL RESECTION OF BLADDER TUMOR N/A 5/20/2024    Procedure: CYSTOSCOPY TRANSURETHRAL RESECTION OF BLADDER TUMOR;  Surgeon: Ron Alves MD;  Location: [facility];  Service: Urology;  Laterality: N/A;       Family History   Problem Relation Age of Onset    Heart disease Father     Cancer Mother     Lung cancer Mother        Social History     Socioeconomic History    Marital status:     Tobacco Use    Smoking status: Every Day     Current packs/day: 1.50     Types: Cigarettes    Smokeless tobacco: Never   Vaping Use    Vaping status: Never Used   Substance and Sexual Activity    Alcohol use: Not Currently     Comment: denies at this time.    Drug use: Defer     Types: Other, Marijuana     Comment: DENIES drug use but on suboxone. States she used xanax    Sexual activity: Defer     Partners: Male           Objective   Physical Exam  Vitals and nursing note reviewed.   Constitutional:       General: She is not in acute distress.     Appearance: She is well-developed. She is not diaphoretic.   HENT:      Head: Normocephalic and atraumatic.      Right Ear: External ear normal.      Left Ear: External ear normal.      Nose: Nose normal.      Mouth/Throat:      Mouth: Mucous membranes are moist.      Pharynx: Oropharynx is clear.   Eyes:      Conjunctiva/sclera: Conjunctivae normal.      Pupils: Pupils are equal, round, and reactive to light.   Neck:      Vascular: No JVD.      Trachea: No tracheal deviation.   Cardiovascular:      Rate and Rhythm: Normal rate and regular rhythm.      Heart sounds: Normal heart sounds. No murmur heard.  Pulmonary:      Effort: Pulmonary effort is normal. No respiratory distress.      Breath sounds: Normal breath sounds. No wheezing.   Abdominal:      General: Bowel sounds are normal.      Palpations: Abdomen is soft.      Tenderness: There is no abdominal tenderness.   Musculoskeletal:         General: No deformity. Normal range of motion.      Cervical back: Normal range of motion and neck supple.   Skin:     General: Skin is warm and dry.      Capillary Refill: Capillary refill takes less than 2 seconds.      Coloration: Skin is not pale.      Findings: No erythema or rash.   Neurological:      General: No focal deficit present.      Mental Status: She is alert and oriented to person, place, and time. Mental status is at baseline.      Cranial Nerves: No  cranial nerve deficit.   Psychiatric:         Behavior: Behavior normal.         Thought Content: Thought content normal.         Procedures       Results for orders placed or performed during the hospital encounter of 07/24/24   Comprehensive Metabolic Panel    Specimen: Arm, Right; Blood   Result Value Ref Range    Glucose 114 (H) 65 - 99 mg/dL    BUN 5 (L) 6 - 20 mg/dL    Creatinine 0.91 0.57 - 1.00 mg/dL    Sodium 134 (L) 136 - 145 mmol/L    Potassium 4.0 3.5 - 5.2 mmol/L    Chloride 95 (L) 98 - 107 mmol/L    CO2 26.9 22.0 - 29.0 mmol/L    Calcium 10.1 8.6 - 10.5 mg/dL    Total Protein 8.9 (H) 6.0 - 8.5 g/dL    Albumin 4.1 3.5 - 5.2 g/dL    ALT (SGPT) 9 1 - 33 U/L    AST (SGOT) 13 1 - 32 U/L    Alkaline Phosphatase 105 39 - 117 U/L    Total Bilirubin 0.2 0.0 - 1.2 mg/dL    Globulin 4.8 gm/dL    A/G Ratio 0.9 g/dL    BUN/Creatinine Ratio 5.5 (L) 7.0 - 25.0    Anion Gap 12.1 5.0 - 15.0 mmol/L    eGFR 75.6 >60.0 mL/min/1.73   Urinalysis With Microscopic If Indicated (No Culture) - Urine, Clean Catch    Specimen: Urine, Clean Catch   Result Value Ref Range    Color, UA Yellow Yellow, Straw    Appearance, UA Cloudy (A) Clear    pH, UA 6.5 5.0 - 8.0    Specific Gravity, UA 1.007 1.005 - 1.030    Glucose, UA Negative Negative    Ketones, UA Negative Negative    Bilirubin, UA Negative Negative    Blood, UA Moderate (2+) (A) Negative    Protein, UA Trace (A) Negative    Leuk Esterase, UA Large (3+) (A) Negative    Nitrite, UA Negative Negative    Urobilinogen, UA 0.2 E.U./dL 0.2 - 1.0 E.U./dL   C-reactive Protein    Specimen: Arm, Right; Blood   Result Value Ref Range    C-Reactive Protein 0.82 (H) 0.00 - 0.50 mg/dL   CBC Auto Differential    Specimen: Arm, Right; Blood   Result Value Ref Range    WBC 12.71 (H) 3.40 - 10.80 10*3/mm3    RBC 4.45 3.77 - 5.28 10*6/mm3    Hemoglobin 13.2 12.0 - 15.9 g/dL    Hematocrit 41.0 34.0 - 46.6 %    MCV 92.1 79.0 - 97.0 fL    MCH 29.7 26.6 - 33.0 pg    MCHC 32.2 31.5 - 35.7 g/dL     RDW 13.8 12.3 - 15.4 %    RDW-SD 47.3 37.0 - 54.0 fl    MPV 9.4 6.0 - 12.0 fL    Platelets 358 140 - 450 10*3/mm3    Neutrophil % 64.3 42.7 - 76.0 %    Lymphocyte % 20.8 19.6 - 45.3 %    Monocyte % 11.3 5.0 - 12.0 %    Eosinophil % 2.4 0.3 - 6.2 %    Basophil % 0.9 0.0 - 1.5 %    Immature Grans % 0.3 0.0 - 0.5 %    Neutrophils, Absolute 8.17 (H) 1.70 - 7.00 10*3/mm3    Lymphocytes, Absolute 2.64 0.70 - 3.10 10*3/mm3    Monocytes, Absolute 1.43 (H) 0.10 - 0.90 10*3/mm3    Eosinophils, Absolute 0.31 0.00 - 0.40 10*3/mm3    Basophils, Absolute 0.12 0.00 - 0.20 10*3/mm3    Immature Grans, Absolute 0.04 0.00 - 0.05 10*3/mm3    nRBC 0.0 0.0 - 0.2 /100 WBC   Urine Drug Screen - Urine, Clean Catch    Specimen: Urine, Clean Catch   Result Value Ref Range    THC, Screen, Urine Negative Negative    Phencyclidine (PCP), Urine Negative Negative    Cocaine Screen, Urine Negative Negative    Methamphetamine, Ur Negative Negative    Opiate Screen Negative Negative    Amphetamine Screen, Urine Negative Negative    Benzodiazepine Screen, Urine Negative Negative    Tricyclic Antidepressants Screen Positive (A) Negative    Methadone Screen, Urine Negative Negative    Barbiturates Screen, Urine Negative Negative    Oxycodone Screen, Urine Negative Negative    Buprenorphine, Screen, Urine Positive (A) Negative   Fentanyl, Urine - Urine, Clean Catch    Specimen: Urine, Clean Catch   Result Value Ref Range    Fentanyl, Urine Negative Negative   Urinalysis, Microscopic Only - Urine, Clean Catch    Specimen: Urine, Clean Catch   Result Value Ref Range    RBC, UA 11-20 (A) None Seen, 0-2 /HPF    WBC, UA Too Numerous to Count (A) None Seen, 0-2 /HPF    Bacteria, UA 1+ (A) None Seen /HPF    Squamous Epithelial Cells, UA 0-2 None Seen, 0-2 /HPF    Hyaline Casts, UA None Seen None Seen /LPF    Methodology Automated Microscopy    Hansen Urine Culture Tube - Urine, Clean Catch    Specimen: Urine, Clean Catch   Result Value Ref Range    Extra Tube  Hold for add-ons.    Green Top (Gel)   Result Value Ref Range    Extra Tube Hold for add-ons.    Lavender Top   Result Value Ref Range    Extra Tube hold for add-on    Gold Top - SST   Result Value Ref Range    Extra Tube Hold for add-ons.    Light Blue Top   Result Value Ref Range    Extra Tube Hold for add-ons.       CT Abdomen Pelvis Without Contrast   Final Result   1.  Again there is severe right hydronephrosis secondary to nonspecific   region of urinary bladder wall thickening, but without discernible mass   on this imaging possibly due to lack of distention of the urinary   bladder.   2.  Bilateral inguinal region lymphadenopathy with a right inguinal   region lymph node measuring 2.7 and the left up to about 2.8.   3.  Probable left obturator region lymph node measuring 1.8 cm.   4.  Degenerative changes lumbar spine as described.           This report was finalized on 7/24/2024 11:06 AM by Dr. Leonel Meehan MD.               ED Course  ED Course as of 07/24/24 1230   Wed Jul 24, 2024   1224 CT Abdomen Pelvis Without Contrast  IMPRESSION:  1.  Again there is severe right hydronephrosis secondary to nonspecific  region of urinary bladder wall thickening, but without discernible mass  on this imaging possibly due to lack of distention of the urinary  bladder.  2.  Bilateral inguinal region lymphadenopathy with a right inguinal  region lymph node measuring 2.7 and the left up to about 2.8.  3.  Probable left obturator region lymph node measuring 1.8 cm.  4.  Degenerative changes lumbar spine as described.   [MB]   1229 CT Abdomen Pelvis Without Contrast  Patient is being evaluated for cervical cancer at .  [MB]      ED Course User Index  [MB] Joanna Reich APRN                                             Medical Decision Making  Patient is a 54-year-old female with past medical history significant for cervical cancer, COPD, hypertension, psychosis, substance abuse, arthritis, anxiety and depression.  She  presents to the ED today with complaints of vaginal discharge and dysuria.  She denies any fever or abdominal pain.  Denies any other significant complaints.    Problems Addressed:  Acute UTI: complicated acute illness or injury  Vaginal discharge: complicated acute illness or injury    Amount and/or Complexity of Data Reviewed  Labs: ordered.  Radiology: ordered. Decision-making details documented in ED Course.    Risk  Prescription drug management.        Final diagnoses:   Acute UTI   Vaginal discharge       ED Disposition  ED Disposition       ED Disposition   Discharge    Condition   Stable    Comment   --               Shelia Leslie, APRN  [address]  [place] [id number]  [phone]    Call in 2 days           Medication List        New Prescriptions      cefdinir 300 MG capsule  Commonly known as: OMNICEF  Take 1 capsule by mouth 2 (Two) Times a Day for 7 days.     metroNIDAZOLE 500 MG tablet  Commonly known as: FLAGYL  Take 1 tablet by mouth 2 (Two) Times a Day for 7 days.               Where to Get Your Medications        These medications were sent to [address] [address] - [phone]  - [phone] [address], [facility] [address]      Phone: [phone]   cefdinir 300 MG capsule  metroNIDAZOLE 500 MG tablet            Joanna Reich APRN  07/24/24 1224       Joanna Reich APRN  07/24/24 1230

## 2024-07-25 LAB — BACTERIA SPEC AEROBE CULT: NORMAL

## 2024-08-05 ENCOUNTER — OFFICE VISIT (OUTPATIENT)
Dept: UROLOGY | Facility: CLINIC | Age: 54
End: 2024-08-05
Payer: MEDICAID

## 2024-08-05 VITALS
HEIGHT: 63 IN | DIASTOLIC BLOOD PRESSURE: 76 MMHG | WEIGHT: 156 LBS | HEART RATE: 110 BPM | BODY MASS INDEX: 27.64 KG/M2 | SYSTOLIC BLOOD PRESSURE: 140 MMHG

## 2024-08-05 DIAGNOSIS — C77.0 METASTATIC CANCER TO CERVICAL LYMPH NODES: ICD-10-CM

## 2024-08-05 DIAGNOSIS — R32 URINARY INCONTINENCE, UNSPECIFIED TYPE: Primary | ICD-10-CM

## 2024-08-05 DIAGNOSIS — M79.604 BILATERAL LEG PAIN: ICD-10-CM

## 2024-08-05 DIAGNOSIS — M79.605 BILATERAL LEG PAIN: ICD-10-CM

## 2024-08-05 DIAGNOSIS — B96.89 LOCALIZED BACTERIAL SKIN INFECTION: ICD-10-CM

## 2024-08-05 DIAGNOSIS — L08.9 LOCALIZED BACTERIAL SKIN INFECTION: ICD-10-CM

## 2024-08-05 RX ORDER — BUPRENORPHINE HYDROCHLORIDE AND NALOXONE HYDROCHLORIDE DIHYDRATE 8; 2 MG/1; MG/1
1 TABLET SUBLINGUAL
COMMUNITY

## 2024-08-05 NOTE — PROGRESS NOTES
Chief Complaint  Urinary Incontinence (ED FOLLOW FOR CERVICAL CANCER -METASTATIC /OAB/DI WITH URINE INCONTINENCE)    Gilson Solis presents to [facility] for CERVICAL CANCER -METASTATIC /OAB/DI WITH URINE INCONTINENCE  History of Present Illness   History of Present Illness  Mrs. MARY BETH SOLIS is a pleasant 54-year-old female who presents to clinic today for evaluation for urinary incontinence.THE  Patient is an ER follow-up for acute cystitis, and vaginal discharge. 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 She has a significant past medical history for cervical cancer, COPD, hypertension, psychosis, substance abuse, arthritis, anxiety and depression.    THE Patient underwent CT angiogram of the abdomen and pelvis which showed right hydronephrosis with ureteral dilatation to the level of the bladder concerning for neoplastic involvement right posterior bladder. She also had a cystoscopy, transurethral resection of the bladder tumor-TURBT for further classification. Pathology showed moderately differentiated squamous cell metastatic cervical cancer. She has had lots of issues with compliance and treatment. Nevertheless, she presented to [facility] ED on 07/24/2023 with complaints of vaginal discharge and difficulty urinating. She did deny fever, chills, denied any other symptoms. Her past medical history includes COPD, hypertension, psychosis, substance abuse, arthritis, and severe anxiety and depression.     POST ED EVALUATION, Her urine culture 07/24/2023 showed normal urogenital amanda. Nevertheless, she was prescribed cefdinir and metronidazole which she has since completed. On clinic evaluation today, she is unable to give us any urine for recheck, incontinent of bladder, UCS Depends. Postvoid residual is 0 mL. For her overactive bladder symptoms, she  was started on Ditropan 10 mg daily. Patient was also started on estradiol 0.1 mg vaginal cream.     The patient reports that she has been utilizing a diaper for the past 2 weeks due to frequent urination, particularly when wearing diapers. However, she is unable to urinate when seated on the commode. She has completed her course of antibiotics prescribed during her ER visit. She was diagnosed with lymphedema and requires a refill of Keflex. She experiences severe hip pain when lying flat. She has not undergone any PET scans and is not currently receiving treatment for cervical cancer. She was diagnosed with metastatic cancer on 05/14/2023. She has not discussed her metastatic cancer with her oncologist. She reports urinary incontinence when wearing diapers, but denies nocturia. She denies any bladder prolapse. She has not attempted to self-catheterize herself. She was discharged home to follow-up with PCP/oncology, and is currently on Keflex which she reports tolerating well bilateral lower extremity redness and swelling.     On clinic evaluation today 08/05/2024, she is in apparent discomfort with pelvic pain.  She is unable to give us any urine for analysis.  Patient does wear depends and is incontinent without any sensory awareness.  Nevertheless she reports pelvic pressure and suprapubic discomfort that has remained very bothersome to her.  She also reports bilateral lower extremity redness and irritation with multiple visible source from constant scratching she states.    Prior to ED evaluation, she was last evaluated on 06/18/2024 by Dr. Alves.  Prior to that she has been referred to oncology for treatment of her breast cervical cancer but apparently has refused chemotherapy.  Dr. Alves had discussed the need for cancer treatment/radiation ASAP.  Today we discussed the need for therapy but patient reports she has been recommended to complete a PET scan but has been unable to tolerate procedure secondary  "to severe hip pain and difficulty lying still for procedure.    Patient encouraged to discuss concerns with oncologist and need for PET scan to be done under sedation for definitive plan of care ASAP.    Patient is agreeable.        Active Ambulatory Problems     Diagnosis Date Noted    Severe mixed bipolar 1 disorder 05/31/2023    Post traumatic stress disorder (PTSD) 05/31/2023    Opioid use disorder, severe, on maintenance therapy, dependence 05/31/2023    Delusion 06/03/2023    Manic behavior 12/22/2023    Bladder mass 05/15/2024    Cervical cancer, FIGO stage IIIB 05/27/2024    Tobacco abuse 06/03/2024    Cellulitis of right lower extremity 06/03/2024    Metastatic cancer to cervical lymph nodes 08/06/2024    Bilateral leg pain 08/06/2024    Localized bacterial skin infection 08/06/2024    Urinary incontinence 08/06/2024     Resolved Ambulatory Problems     Diagnosis Date Noted    Hallucinations 05/23/2023     Past Medical History:   Diagnosis Date    Abdominal pain     Abnormal vaginal bleeding     Anxiety     Arthritis     Back pain     Bilateral swelling of feet     Cancer 2024    Cervical cancer     COPD (chronic obstructive pulmonary disease)     Hypertension     Joint pain     Muscle pain     Psychosis     Seizures     Substance abuse       Objective   Vital Signs:   /76 (BP Location: Right arm, Patient Position: Sitting)   Pulse 110   Ht 160 cm (62.99\")   Wt 70.8 kg (156 lb)   BMI 27.64 kg/m²       ROS:   Review of Systems   Constitutional:  Positive for activity change, appetite change, fatigue and unexpected weight gain. Negative for chills, diaphoresis, fever and unexpected weight loss.   HENT: Negative.  Negative for congestion, ear discharge, ear pain, nosebleeds, rhinorrhea, sinus pressure and sore throat.    Eyes: Negative.  Negative for blurred vision, double vision, photophobia, pain, redness and visual disturbance.   Respiratory:  Negative for apnea, cough, chest tightness, shortness " of breath, wheezing and stridor.    Cardiovascular:  Positive for leg swelling. Negative for chest pain and palpitations.   Gastrointestinal:  Positive for abdominal distention and abdominal pain. Negative for constipation, diarrhea, nausea and vomiting.   Endocrine: Negative.  Negative for polydipsia, polyphagia and polyuria.   Genitourinary:  Positive for decreased urine volume, flank pain, frequency, pelvic pain, pelvic pressure, urgency and urinary incontinence. Negative for difficulty urinating, dyspareunia, dysuria, genital sores, hematuria and vaginal discharge.   Musculoskeletal:  Positive for back pain and myalgias. Negative for arthralgias and joint swelling.   Skin:  Negative for pallor, rash and wound.   Allergic/Immunologic: Negative.    Neurological:  Negative for dizziness, tremors, syncope, weakness, light-headedness, headache, memory problem and confusion.   Hematological: Negative.    Psychiatric/Behavioral:  Positive for sleep disturbance and stress. Negative for behavioral problems and decreased concentration.         Physical Exam  Constitutional:       General: She is in acute distress.      Appearance: She is well-developed. She is obese. She is ill-appearing.   HENT:      Head: Normocephalic and atraumatic.   Eyes:      Pupils: Pupils are equal, round, and reactive to light.   Neck:      Thyroid: No thyromegaly.      Trachea: No tracheal deviation.   Cardiovascular:      Rate and Rhythm: Normal rate and regular rhythm.      Heart sounds: No murmur heard.  Pulmonary:      Effort: Pulmonary effort is normal. No respiratory distress.      Breath sounds: Normal breath sounds. No stridor. No wheezing.   Abdominal:      General: Bowel sounds are normal. There is distension.      Palpations: Abdomen is soft.      Tenderness: There is abdominal tenderness. There is guarding.   Genitourinary:     Labia:         Right: No tenderness.         Left: No tenderness.       Vagina: Normal. No vaginal  discharge.      Comments: Cervical cancer-metastatic.  Pelvic pain and discomfort.  Patient reports urinary incontinence without any sensory awareness.  PVR-0 mL  Musculoskeletal:         General: Tenderness present. No deformity. Normal range of motion.      Cervical back: Normal range of motion.   Skin:     General: Skin is warm and dry.      Capillary Refill: Capillary refill takes less than 2 seconds.      Coloration: Skin is pale.      Findings: No erythema or rash.   Neurological:      Mental Status: She is alert and oriented to person, place, and time.      Cranial Nerves: No cranial nerve deficit.      Sensory: No sensory deficit.      Motor: Weakness present.      Coordination: Coordination abnormal.      Gait: Gait abnormal.   Psychiatric:         Behavior: Behavior normal.         Thought Content: Thought content normal.         Judgment: Judgment normal.        Physical Exam    Result Review :     UA          12/22/2023    10:28 4/26/2024    12:26 7/24/2024    10:03   Urinalysis   Squamous Epithelial Cells, UA 0-2  0-2  0-2    Specific Gravity, UA 1.006  1.008  1.007    Ketones, UA Negative  Negative  Negative    Blood, UA Negative  Trace  Moderate (2+)    Leukocytes, UA Small (1+)  Moderate (2+)  Large (3+)    Nitrite, UA Positive  Positive  Negative    RBC, UA 0-2  3-5  11-20    WBC, UA 11-20  21-50  Too Numerous to Count    Bacteria, UA 4+  4+  1+      Urine Culture          12/22/2023    10:28 4/26/2024    12:26 7/24/2024    10:03   Urine Culture   Urine Culture >100,000 CFU/mL Escherichia coli  >100,000 CFU/mL Escherichia coli  >100,000 CFU/mL Normal Urogenital Yvonne               Assessment and Plan    Problem List Items Addressed This Visit          Genitourinary and Reproductive     Urinary incontinence - Primary    Relevant Orders    Bladder Scan (Completed)       Hematology and Neoplasia    Metastatic cancer to cervical lymph nodes       Musculoskeletal and Injuries    Bilateral leg pain     Relevant Medications    mupirocin (BACTROBAN) 2 % ointment       Other    Localized bacterial skin infection    Relevant Medications    mupirocin (BACTROBAN) 2 % ointment       Assessment & Plan       ASSESSMENT  CERVICAL CA /ACUTE CYSTITIS/OVERACTIVE BLADDER/DI WITH URINE INCONTINENCE  Mrs. MARY BETH SOLIS is a pleasant 54-year-old female with metastatic cervical cancer stage IIIb who presents to clinic today for evaluation for urinary incontinence. Acute cystitis without hematuria.  She reports urinary incontinence currently wearing depends on a daily basis.  Patient is unable to give us any urine for analysis, her PVR is 0 mL.    Overactive Bladder/Urinary Incontinence:  Very pleasant patient evaluated in clinic today with mixed urinary incontinence symptoms that have been ongoing, and now gradually becoming very bothersome to her.  She does not have recurrent UTIs, however has ongoing cervical cancer pending chemo/radiation therapy.  Reports intermittent episodes of  dysuria, burning on urination, or gross hematuria.  Her last urine dipstick in ED completely negative for any infection, it is negative for gross/microscopic hematuria.  Today she is unable to give us any urine for evaluation, she is has been incontinent with PVR 0 mL.      We discussed treatable and non-treatable causes of both stress and urge urinary incontinence.With regards to stress urinary incontinence we discussed its relationship to childbirth and pelvic health.  We discussed the grading of stress incontinence with trying to quantitate the number of pads used.  We talked about leaking urine with laughing, lifting, coughing, and sexual intercourse.      Talked about the Urge component and the concept of mixed incontinence where upon the stress is treatable, but the urge may exist and that 50% of the time the urge will resolve with treatment of the stress incontinence.  We talked with the diagnostic workup including a postvoid residual urine, OR  even a simple cystometrogram.  I discussed the findings that may be neurologically related including commonly seen with multiple sclerosis, Parkinson's disease, and stroke. I talked about the various therapeutic options including anticholinergics, beta 3 agonists, and alpha blockade if there is a component of obstruction.  I discussed the side effects of anti-cholinergic including dry mouth, double vision.  .      PLAN  Stage IIIb cervical cancer patient encouraged to follow-up with cervical cancer treatment/PET scan as recommended     She will drop off another urine in clinic if symptomatic, due to concerns of frequency, urgency, pelvic pain and pressure.  I will call her with results if any positive bacterial growth    Discussed increasing p.o. fluid intake of at least 1 to 2 L of water daily, but avoiding bladder irritants such as caffeine products, citrus/spicy foods     She should follow a timed voiding bladder training program, such as limiting the amount of time between bathroom breaks, using the bathroom at regular intervals such as every 2-3 hours.  And using techniques to suppress bladder urges.      Also discussed pelvic floor muscle exercises, and do Keagle exercises to strengthen the muscles to help control urination.    She will follow-up with oncology ASAP    Also follow-up with PCP for bilateral lower extremity swelling/redness    In the meantime mupirocin ointment sent to patient's pharmacy    Follow-up in clinic on as-needed basis    Patient is agreeable plan of care.     Patient reports that she is not currently experiencing any symptoms of urinary incontinence.      BMI is >= 25 and <30. (Overweight) The following options were offered after discussion;: weight loss educational material (shared in after visit summary), exercise counseling/recommendations, and nutrition counseling/recommendations      RADIOLOGY (CT AND/OR KUB):    CT Abdomen and Pelvis: No results found for this or any previous  visit.     CT Stone Protocol: No results found for this or any previous visit.     KUB: No results found for this or any previous visit.       [unfilled]  LABS (3 MONTHS):    Admission on 07/24/2024, Discharged on 07/24/2024   Component Date Value Ref Range Status    Glucose 07/24/2024 114 (H)  65 - 99 mg/dL Final    BUN 07/24/2024 5 (L)  6 - 20 mg/dL Final    Creatinine 07/24/2024 0.91  0.57 - 1.00 mg/dL Final    Sodium 07/24/2024 134 (L)  136 - 145 mmol/L Final    Potassium 07/24/2024 4.0  3.5 - 5.2 mmol/L Final    Slight hemolysis detected by analyzer. Result may be falsely elevated.    Chloride 07/24/2024 95 (L)  98 - 107 mmol/L Final    CO2 07/24/2024 26.9  22.0 - 29.0 mmol/L Final    Calcium 07/24/2024 10.1  8.6 - 10.5 mg/dL Final    Total Protein 07/24/2024 8.9 (H)  6.0 - 8.5 g/dL Final    Albumin 07/24/2024 4.1  3.5 - 5.2 g/dL Final    ALT (SGPT) 07/24/2024 9  1 - 33 U/L Final    AST (SGOT) 07/24/2024 13  1 - 32 U/L Final    Alkaline Phosphatase 07/24/2024 105  39 - 117 U/L Final    Total Bilirubin 07/24/2024 0.2  0.0 - 1.2 mg/dL Final    Globulin 07/24/2024 4.8  gm/dL Final    A/G Ratio 07/24/2024 0.9  g/dL Final    BUN/Creatinine Ratio 07/24/2024 5.5 (L)  7.0 - 25.0 Final    Anion Gap 07/24/2024 12.1  5.0 - 15.0 mmol/L Final    eGFR 07/24/2024 75.6  >60.0 mL/min/1.73 Final    Color, UA 07/24/2024 Yellow  Yellow, Straw Final    Appearance, UA 07/24/2024 Cloudy (A)  Clear Final    pH, UA 07/24/2024 6.5  5.0 - 8.0 Final    Specific Gravity, UA 07/24/2024 1.007  1.005 - 1.030 Final    Glucose, UA 07/24/2024 Negative  Negative Final    Ketones, UA 07/24/2024 Negative  Negative Final    Bilirubin, UA 07/24/2024 Negative  Negative Final    Blood, UA 07/24/2024 Moderate (2+) (A)  Negative Final    Protein, UA 07/24/2024 Trace (A)  Negative Final    Leuk Esterase, UA 07/24/2024 Large (3+) (A)  Negative Final    Nitrite, UA 07/24/2024 Negative  Negative Final    Urobilinogen, UA 07/24/2024 0.2 E.U./dL  0.2 - 1.0  E.U./dL Final    C-Reactive Protein 07/24/2024 0.82 (H)  0.00 - 0.50 mg/dL Final    WBC 07/24/2024 12.71 (H)  3.40 - 10.80 10*3/mm3 Final    RBC 07/24/2024 4.45  3.77 - 5.28 10*6/mm3 Final    Hemoglobin 07/24/2024 13.2  12.0 - 15.9 g/dL Final    Hematocrit 07/24/2024 41.0  34.0 - 46.6 % Final    MCV 07/24/2024 92.1  79.0 - 97.0 fL Final    MCH 07/24/2024 29.7  26.6 - 33.0 pg Final    MCHC 07/24/2024 32.2  31.5 - 35.7 g/dL Final    RDW 07/24/2024 13.8  12.3 - 15.4 % Final    RDW-SD 07/24/2024 47.3  37.0 - 54.0 fl Final    MPV 07/24/2024 9.4  6.0 - 12.0 fL Final    Platelets 07/24/2024 358  140 - 450 10*3/mm3 Final    Neutrophil % 07/24/2024 64.3  42.7 - 76.0 % Final    Lymphocyte % 07/24/2024 20.8  19.6 - 45.3 % Final    Monocyte % 07/24/2024 11.3  5.0 - 12.0 % Final    Eosinophil % 07/24/2024 2.4  0.3 - 6.2 % Final    Basophil % 07/24/2024 0.9  0.0 - 1.5 % Final    Immature Grans % 07/24/2024 0.3  0.0 - 0.5 % Final    Neutrophils, Absolute 07/24/2024 8.17 (H)  1.70 - 7.00 10*3/mm3 Final    Lymphocytes, Absolute 07/24/2024 2.64  0.70 - 3.10 10*3/mm3 Final    Monocytes, Absolute 07/24/2024 1.43 (H)  0.10 - 0.90 10*3/mm3 Final    Eosinophils, Absolute 07/24/2024 0.31  0.00 - 0.40 10*3/mm3 Final    Basophils, Absolute 07/24/2024 0.12  0.00 - 0.20 10*3/mm3 Final    Immature Grans, Absolute 07/24/2024 0.04  0.00 - 0.05 10*3/mm3 Final    nRBC 07/24/2024 0.0  0.0 - 0.2 /100 WBC Final    Extra Tube 07/24/2024 Hold for add-ons.   Final    Auto resulted.    Extra Tube 07/24/2024 hold for add-on   Final    Auto resulted    Extra Tube 07/24/2024 Hold for add-ons.   Final    Auto resulted.    Extra Tube 07/24/2024 Hold for add-ons.   Final    Auto resulted    THC, Screen, Urine 07/24/2024 Negative  Negative Final    Phencyclidine (PCP), Urine 07/24/2024 Negative  Negative Final    Cocaine Screen, Urine 07/24/2024 Negative  Negative Final    Methamphetamine, Ur 07/24/2024 Negative  Negative Final    Opiate Screen 07/24/2024  Negative  Negative Final    Amphetamine Screen, Urine 2024 Negative  Negative Final    Benzodiazepine Screen, Urine 2024 Negative  Negative Final    Tricyclic Antidepressants Screen 2024 Positive (A)  Negative Final    Methadone Screen, Urine 2024 Negative  Negative Final    Barbiturates Screen, Urine 2024 Negative  Negative Final    Oxycodone Screen, Urine 2024 Negative  Negative Final    Buprenorphine, Screen, Urine 2024 Positive (A)  Negative Final    Fentanyl, Urine 2024 Negative  Negative Final    RBC, UA 2024 11-20 (A)  None Seen, 0-2 /HPF Final    WBC, UA 2024 Too Numerous to Count (A)  None Seen, 0-2 /HPF Final    Bacteria, UA 2024 1+ (A)  None Seen /HPF Final    Squamous Epithelial Cells, UA 2024 0-2  None Seen, 0-2 /HPF Final    Hyaline Casts, UA 2024 None Seen  None Seen /LPF Final    Methodology 2024 Automated Microscopy   Final    Extra Tube 2024 Hold for add-ons.   Final    Auto resulted.    Urine Culture 2024 >100,000 CFU/mL Normal Urogenital Yvonne   Final   Admission on 2024, Discharged on 2024   Component Date Value Ref Range Status    HCG, Urine, QL 2024 Negative  Negative Final    Lot Number 2024 #[id number]   Final    Internal Positive Control 2024 Passed  Positive, Passed Final    Internal Negative Control 2024 Passed  Negative, Passed Final    Expiration Date 2024   Final    Reference Lab Report 2024    Final                    Value:[facility]  [address] [place] [address]  Phone: [phone] or [phone]  Fax: [phone]  Juventino Pride M.D., Medical Director    PATIENT NAME                           LABORATORY NO.  MARY BETH ZARATE                           [id number]  [id number]                         AGE              SEX  SSN           CLIENT REF #  [facility]              54   "    1970      [id number]   [id number]    [address]                     REQUESTING M.D.     ATTENDING M.D.     COPY TO.  MARIA DEL ROSARIO GIRON [id number]                   MARYFREDRICK  JO ANN  DATE COLLECTED      DATE RECEIVED      DATE REPORTED  05/20/2024 05/20/2024 05/22/2024    DIAGNOSIS:  URINARY BLADDER, TUR:  Moderately differentiated, p-16 positive squamous cell carcinoma involving  submucosal tissue, with background inflammation (see comment)    CAM    COMMENT:  The submucosa of the bladder is involved by moderately  differentiated squamous cell                           carcinoma.  Overlying urothelium is intact and  benign in appearance.  Review of the operative note in the electronic medical  record reveals an impression of \"right submucosal tumor consistent with cervical  cancer\".  In light of this information, immunohistochemical staining is  performed on block A1 to further evaluate the tumor.  The neoplastic cells are  diffusely positive for p16 and largely negative for GATA3.  The GATA3 stain  highlights intact benign urothelium.  The overall findings would support  gynecologic origin of the tumor.  Clinical correlation is essential.    Representative tumor block: A1    CLINICAL HISTORY:  Bladder mass    SPECIMENS RECEIVED:  URINARY BLADDER, TUR    MICROSCOPIC DESCRIPTION:  Tissue blocks are prepared and slides are examined microscopically. See  diagnosis for details.    The internal and external (both positive and negative) controls reacted  appropriately. Some of our immunohistochemical and in situ hybridization  studies are performed as                           analyte specific reagents. The following statement  applies to those tests: This test was developed, and its performance  characteristics determined by [facility]. It has not been  cleared or approved by the [place] Food and Drug Administration. However, the  FDA has determined that approval and clearance are " "not necessary.    Professional interpretation rendered by Clementina Casillas M.D., EVAN at  [facility] [employer], [address] [place] [id number].    GROSS DESCRIPTION:  Labeled as \"bladder tumor biopsy\", consisting of multiple pieces of tan-pink  bladder TUR weighing less than 1 g and measuring 1.5 x 0.8 x 0.2 cm in  aggregate, submitted entirely in 1 cassette.  SOG    REVIEWED, DIAGNOSED AND ELECTRONICALLY  SIGNED BY:    Clementina Casillas M.D., EVAN  CPT CODES:  80818, 34982, 75419     Admission on 05/14/2024, Discharged on 05/14/2024   Component Date Value Ref Range Status    Glucose 05/14/2024 119 (H)  65 - 99 mg/dL Final    BUN 05/14/2024 5 (L)  6 - 20 mg/dL Final    Creatinine 05/14/2024 0.84  0.57 - 1.00 mg/dL Final    Sodium 05/14/2024 139  136 - 145 mmol/L Final    Potassium 05/14/2024 3.5  3.5 - 5.2 mmol/L Final    Chloride 05/14/2024 99  98 - 107 mmol/L Final    CO2 05/14/2024 28.1  22.0 - 29.0 mmol/L Final    Calcium 05/14/2024 9.5  8.6 - 10.5 mg/dL Final    Total Protein 05/14/2024 8.3  6.0 - 8.5 g/dL Final    Albumin 05/14/2024 4.1  3.5 - 5.2 g/dL Final    ALT (SGPT) 05/14/2024 12  1 - 33 U/L Final    AST (SGOT) 05/14/2024 18  1 - 32 U/L Final    Alkaline Phosphatase 05/14/2024 99  39 - 117 U/L Final    Total Bilirubin 05/14/2024 0.2  0.0 - 1.2 mg/dL Final    Globulin 05/14/2024 4.2  gm/dL Final    A/G Ratio 05/14/2024 1.0  g/dL Final    BUN/Creatinine Ratio 05/14/2024 6.0 (L)  7.0 - 25.0 Final    Anion Gap 05/14/2024 11.9  5.0 - 15.0 mmol/L Final    eGFR 05/14/2024 83.2  >60.0 mL/min/1.73 Final    Protime 05/14/2024 12.2  12.1 - 14.7 Seconds Final    INR 05/14/2024 0.89 (L)  0.90 - 1.10 Final    PTT 05/14/2024 28.6  26.5 - 34.5 seconds Final    HCG Qualitative 05/14/2024 Negative  Negative Final    proBNP 05/14/2024 354.8  0.0 - 900.0 pg/mL Final    Blood Culture 05/14/2024 No growth at 5 days   Final    Blood Culture 05/14/2024 No growth at 5 days   Final    Lactate 05/14/2024 1.6  0.5 - 2.0 " mmol/L Final    Procalcitonin 05/14/2024 0.14  0.00 - 0.25 ng/mL Final    Sed Rate 05/14/2024 45 (H)  0 - 30 mm/hr Final    C-Reactive Protein 05/14/2024 0.42  0.00 - 0.50 mg/dL Final    Creatine Kinase 05/14/2024 81  20 - 180 U/L Final    Magnesium 05/14/2024 1.8  1.6 - 2.6 mg/dL Final    TSH 05/14/2024 1.160  0.270 - 4.200 uIU/mL Final    Free T4 05/14/2024 1.17  0.93 - 1.70 ng/dL Final    WBC 05/14/2024 10.61  3.40 - 10.80 10*3/mm3 Final    RBC 05/14/2024 4.33  3.77 - 5.28 10*6/mm3 Final    Hemoglobin 05/14/2024 13.2  12.0 - 15.9 g/dL Final    Hematocrit 05/14/2024 40.6  34.0 - 46.6 % Final    MCV 05/14/2024 93.8  79.0 - 97.0 fL Final    MCH 05/14/2024 30.5  26.6 - 33.0 pg Final    MCHC 05/14/2024 32.5  31.5 - 35.7 g/dL Final    RDW 05/14/2024 13.1  12.3 - 15.4 % Final    RDW-SD 05/14/2024 44.9  37.0 - 54.0 fl Final    MPV 05/14/2024 9.8  6.0 - 12.0 fL Final    Platelets 05/14/2024 339  140 - 450 10*3/mm3 Final    Neutrophil % 05/14/2024 65.8  42.7 - 76.0 % Final    Lymphocyte % 05/14/2024 19.1 (L)  19.6 - 45.3 % Final    Monocyte % 05/14/2024 10.0  5.0 - 12.0 % Final    Eosinophil % 05/14/2024 3.9  0.3 - 6.2 % Final    Basophil % 05/14/2024 0.9  0.0 - 1.5 % Final    Immature Grans % 05/14/2024 0.3  0.0 - 0.5 % Final    Neutrophils, Absolute 05/14/2024 6.98  1.70 - 7.00 10*3/mm3 Final    Lymphocytes, Absolute 05/14/2024 2.03  0.70 - 3.10 10*3/mm3 Final    Monocytes, Absolute 05/14/2024 1.06 (H)  0.10 - 0.90 10*3/mm3 Final    Eosinophils, Absolute 05/14/2024 0.41 (H)  0.00 - 0.40 10*3/mm3 Final    Basophils, Absolute 05/14/2024 0.10  0.00 - 0.20 10*3/mm3 Final    Immature Grans, Absolute 05/14/2024 0.03  0.00 - 0.05 10*3/mm3 Final    nRBC 05/14/2024 0.0  0.0 - 0.2 /100 WBC Final    Extra Tube 05/14/2024 Hold for add-ons.   Final    Auto resulted.    Extra Tube 05/14/2024 hold for add-on   Final    Auto resulted    Extra Tube 05/14/2024 Hold for add-ons.   Final    Auto resulted        Follow Up   Return if  symptoms worsen or fail to improve, for Next scheduled follow up, RECURRENT UTI/DYSURIA/DETRUSSOR INSTABILITY WITH URINE INCONTINENCE.    Patient was given instructions and counseling regarding her condition or for health maintenance advice. Please see specific information pulled into the AVS if appropriate.          This document has been electronically signed by Griselda Cheng-Akwa, APRN   August 6, 2024 23:20 EDT      Dictated Utilizing Dragon Dictation: Part of this note may be an electronic transcription/translation of spoken language to printed text using the Dragon Dictation System.      Patient or patient representative verbalized consent for the use of Ambient Listening during the visit with  Griselda Cheng-Akwa, APRN for chart documentation. 8/6/2024  22:36 EDT

## 2024-08-06 PROBLEM — L08.9 LOCALIZED BACTERIAL SKIN INFECTION: Status: ACTIVE | Noted: 2024-08-06

## 2024-08-06 PROBLEM — M79.604 BILATERAL LEG PAIN: Status: ACTIVE | Noted: 2024-08-06

## 2024-08-06 PROBLEM — R32 URINARY INCONTINENCE: Status: ACTIVE | Noted: 2024-08-06

## 2024-08-06 PROBLEM — M79.605 BILATERAL LEG PAIN: Status: ACTIVE | Noted: 2024-08-06

## 2024-08-06 PROBLEM — B96.89 LOCALIZED BACTERIAL SKIN INFECTION: Status: ACTIVE | Noted: 2024-08-06

## 2024-08-06 PROBLEM — C77.0 METASTATIC CANCER TO CERVICAL LYMPH NODES: Status: ACTIVE | Noted: 2024-08-06

## 2024-08-13 ENCOUNTER — HOSPITAL ENCOUNTER (OUTPATIENT)
Dept: MRI IMAGING | Facility: HOSPITAL | Age: 54
Discharge: HOME | End: 2024-08-13
Payer: COMMERCIAL

## 2024-08-13 NOTE — PROGRESS NOTES
Subjective     Date: 8/19/2024    Referring Provider  No ref. provider found    Chief Complaint  Cervical cancer, FIGO stage IIIB      Subjective          Anna Moore is a 54 y.o. female who presents today to [facility]Y for initial evaluation .    HPI:   54 y.o. female with past medical history of PTSD, Bipolar 1 disorder, h/o substance abuse currently on suboxone (sober 3 years), current smoker, history of seizures who presents due to diagnosis of cervical cancer.       Oncology History:    May 14, 2024: Presents to [facility] emergency room due to right lower extremity swelling, greater than left lower extremity.  CT angio of the abdomen shows right hydronephrosis with ureteral dilatation to the level of the urinary bladder where there is irregular wall thickening of the urinary bladder with enhancement suspicion for right posterior bladder wall urothelial neoplasm.  Gynecological neoplastic process with local invasion may be considered extending into the right UVJ region.  Underwent cystourethroscopy by Dr. Alves, noted to have lateral submucosal bladder tumor.  Underwent TURBT, found to have moderately differentiated, p16 positive, squamous cell carcinoma involving submucosal tissue.    Maritza 3 2024: Establish care with Dr. Benedict (gynecologic).  Not a surgical candidate, recommend curative chemotherapy with radiation followed by brachytherapy at [facility] under Dr. Panchal.      She presents today August 19, accompanied by her son, Haja.  She denies any vaginal bleed, recent unintentional weight loss.  Reports last Pap smear 5 to 10 years earlier.  She was unable to have PET/CT performed or MRI of the pelvis for radiation planning and staging, due to pain.    Current smoker, smokes 1 and half pack per day, denies any alcohol or drug use.  Has history of drug abuse, currently on Suboxone which she receives from Shruti Kumari in [place].   Last use 3 years ago.  Denies any neuropathy.  Currently on gabapentin for pain control.  Family history significant for mother with lung cancer, maternal aunts and uncles with lung cancer, father with cardiac arrest.      Objective     Objective     Allergy:   Allergies   Allergen Reactions    Codeine Itching and GI Intolerance        Current Medications:   Current Outpatient Medications   Medication Sig Dispense Refill    buprenorphine-naloxone (SUBOXONE) 8-2 MG per SL tablet 1 tablet.      FLUoxetine (PROzac) 40 MG capsule Take 1 capsule by mouth Daily. Indications: Depression 30 capsule 0    gabapentin (NEURONTIN) 400 MG capsule Take 1 capsule by mouth 3 (Three) Times a Day. 90 capsule 0    OLANZapine (zyPREXA) 20 MG tablet Take 1 tablet by mouth Every Night. Indications: psychosis 30 tablet 0    prazosin (MINIPRESS) 2 MG capsule Take 1 capsule by mouth Every Night. Indications: Frightening Dreams 30 capsule 0    QUEtiapine (SEROquel) 200 MG tablet Take 1 tablet by mouth Every Night. Indications: Major Depressive Disorder 30 tablet 0    traZODone (DESYREL) 100 MG tablet Take 1 tablet by mouth At Night As Needed for Sleep. Indications: Trouble Sleeping 30 tablet 0    acetaminophen (Tylenol) 325 MG tablet Take 2 tablets by mouth Every 6 (Six) Hours As Needed for Mild Pain. (Patient not taking: Reported on 8/19/2024) 60 tablet 1    cephalexin (Keflex) 500 MG capsule Take 1 capsule by mouth 4 (Four) Times a Day for 14 days. 56 capsule 0    ibuprofen (ADVIL,MOTRIN) 600 MG tablet Take 1 tablet by mouth Every 6 (Six) Hours As Needed for Mild Pain. (Patient not taking: Reported on 8/19/2024) 30 tablet 1    oxyCODONE (Roxicodone) 5 MG immediate release tablet Take 1 tablet by mouth Every 6 (Six) Hours As Needed for Moderate Pain. (Patient not taking: Reported on 8/19/2024) 10 tablet 0    oxyCODONE-acetaminophen (Percocet)  MG per tablet Take 1 tablet by mouth Every 6 (Six) Hours As Needed for Moderate Pain (Pain).  "(Patient not taking: Reported on 8/19/2024) 30 tablet 0     No current facility-administered medications for this visit.       Past Medical History:  Past Medical History:   Diagnosis Date    Abdominal pain     Abnormal vaginal bleeding     Anxiety     Arthritis     Back pain     Bilateral swelling of feet     Cancer 2024    bladder    Cervical cancer     COPD (chronic obstructive pulmonary disease)     Hypertension     Joint pain     Muscle pain     Psychosis     Seizures     last seizure x2 year ago    Substance abuse        Past Surgical History:  Past Surgical History:   Procedure Laterality Date    ANKLE SURGERY      APPENDECTOMY      CLAVICLE SURGERY Right     TONSILLECTOMY      TRANSURETHRAL RESECTION OF BLADDER TUMOR N/A 5/20/2024    Procedure: CYSTOSCOPY TRANSURETHRAL RESECTION OF BLADDER TUMOR;  Surgeon: Ron Alves MD;  Location: [facility];  Service: Urology;  Laterality: N/A;       Social History:  Social History     Socioeconomic History    Marital status:    Tobacco Use    Smoking status: Every Day     Current packs/day: 1.50     Types: Cigarettes    Smokeless tobacco: Never   Vaping Use    Vaping status: Never Used   Substance and Sexual Activity    Alcohol use: Not Currently     Comment: denies at this time.    Drug use: Defer     Types: Other, Marijuana     Comment: DENIES drug use but on suboxone. States she used xanax    Sexual activity: Defer     Partners: Male         Family History:  Family History   Problem Relation Age of Onset    Heart disease Father     Cancer Mother     Lung cancer Mother        Review of Systems:  Review of Systems   Cardiovascular:  Positive for leg swelling.   Genitourinary:  Positive for vaginal pain.   Musculoskeletal:  Positive for back pain.       Vital Signs:   /76   Pulse 107   Temp 98 °F (36.7 °C) (Temporal)   Resp 20   Ht 160 cm (63\")   Wt 68.2 kg (150 lb 6.4 oz)   SpO2 96%   BMI 26.64 kg/m²      Physical Exam:  Physical " "Exam  Vitals reviewed.   Constitutional:       General: She is not in acute distress.     Appearance: Normal appearance. She is not ill-appearing.   HENT:      Head: Normocephalic and atraumatic.      Mouth/Throat:      Mouth: Mucous membranes are moist.      Pharynx: Oropharynx is clear.   Eyes:      Conjunctiva/sclera: Conjunctivae normal.      Pupils: Pupils are equal, round, and reactive to light.   Cardiovascular:      Rate and Rhythm: Normal rate and regular rhythm.      Heart sounds: No murmur heard.  Pulmonary:      Effort: Pulmonary effort is normal. No respiratory distress.      Breath sounds: Normal breath sounds. No wheezing.   Abdominal:      General: Abdomen is flat. Bowel sounds are normal. There is no distension.      Palpations: Abdomen is soft. There is no mass.      Tenderness: There is no abdominal tenderness. There is no guarding.   Musculoskeletal:         General: No swelling. Normal range of motion.      Cervical back: Normal range of motion.      Right lower leg: Edema present.      Left lower leg: Edema present.   Lymphadenopathy:      Cervical: No cervical adenopathy.   Skin:     General: Skin is warm and dry.      Findings: Erythema, lesion and rash present.   Neurological:      General: No focal deficit present.      Mental Status: She is alert and oriented to person, place, and time. Mental status is at baseline.   Psychiatric:         Mood and Affect: Mood normal.         PHQ-9 Score  PHQ-9 Total Score:       Pain Score  Vitals:    08/19/24 1033   BP: 140/76   Pulse: 107   Resp: 20   Temp: 98 °F (36.7 °C)   TempSrc: Temporal   SpO2: 96%   Weight: 68.2 kg (150 lb 6.4 oz)   Height: 160 cm (63\")   PainSc:   8   PainLoc: Comment: left side pain       ECOG score: 0           PAINSCOREQUALITYMETRIC: Anna Moore reports a pain score of 8.  Given her pain assessment as noted, treatment options were discussed and the following options were decided upon as a follow-up plan to address the " patient's pain: referral to Primary Care for assistance in pain treatment guidance.     Lab Review  Lab Results   Component Value Date    WBC 13.50 (H) 08/19/2024    HGB 12.7 08/19/2024    HCT 39.4 08/19/2024    MCV 92.1 08/19/2024    RDW 14.4 08/19/2024     08/19/2024    NEUTRORELPCT 69.9 08/19/2024    LYMPHORELPCT 14.4 (L) 08/19/2024    MONORELPCT 10.1 08/19/2024    EOSRELPCT 4.4 08/19/2024    BASORELPCT 1.0 08/19/2024    NEUTROABS 9.42 (H) 08/19/2024    LYMPHSABS 1.95 08/19/2024     Lab Results   Component Value Date     (L) 08/19/2024    K 4.5 08/19/2024    CO2 25.8 08/19/2024    CL 98 08/19/2024    BUN 7 08/19/2024    CREATININE 0.84 08/19/2024    EGFRIFNONA 113 02/13/2019    GLUCOSE 102 (H) 08/19/2024    CALCIUM 9.9 08/19/2024    ALKPHOS 98 08/19/2024    AST 13 08/19/2024    ALT 6 08/19/2024    BILITOT 0.2 08/19/2024    ALBUMIN 3.8 08/19/2024    PROTEINTOT 8.0 08/19/2024    MG 1.8 05/14/2024         Radiology Results  CT Angio Abdominal Aorta Bilateral Iliofem Runoff (05/14/2024 12:26)   FINDINGS:   VASCULATURE:    Aorta:  Mild to moderate calcified plaque with minimal noncalcified  plaque of the distal abdominal aorta with mild medium segment stenosis.  No aneurysm or dissection identified.    Celiac trunk and mesenteric arteries:  No acute findings.  No  occlusion or significant stenosis.    Renal arteries:  No acute findings.  No occlusion or significant  stenosis.    Right iliac arteries:  Moderate-advanced calcified and noncalcified  plaque of the right common iliac artery with medium segment moderate  stenosis.    Right femoral/popliteal arteries:  No acute findings.  No occlusion or  significant stenosis.    Right calf/foot arteries:  Three-vessel runoff noted bilateral lower  extremities to level of ankles.    Left iliac arteries:  Moderate calcified plaque with mild noncalcified  plaque left common iliac artery with mild medium segment stenosis.    Left femoral/popliteal arteries:  No  acute findings.  No occlusion or  significant stenosis.    Left calf/foot arteries:  See above.    Lung bases:  Lung bases are clear.  No consolidation.    Heart:  Mild cardiomegaly.      ABDOMEN:    Liver:  Nodular liver margins indicative of cirrhosis.    Gallbladder and bile ducts:  Common bile duct dilatation is noted.,  Approximate 13.2 mm diameter.  No calcified stones.    Pancreas:  Unremarkable as visualized.  No ductal dilation.  No mass.    Spleen:  Unremarkable as visualized.  No splenomegaly.    Adrenals:  Unremarkable as visualized.  No mass.    Kidneys and ureters:  Marked chronic appearing right hydronephrosis is  noted with ureteral dilatation to the level of the urinary bladder where  there is irregular wall thickening of the urinary bladder with  enhancement suspicious for right posterior bladder wall urothelial  neoplasm. Alternatively, gynecologic neoplastic process with local  invasion may be considered as well extending into the right UVJ region.  This would likely account for asymmetric edema of the right lower  extremity. Cystoscopy is warranted for further evaluation. Thinning of  the right renal cortex and parenchyma would indicate chronic etiology.   Irregular soft tissue centered on the right UVJ region of the posterior  bladder wall extending into the perirectal space and right iliac fossa  spans approximately 5.6 x 4.0 cm.    Stomach and bowel:  Unremarkable as visualized.  No obstruction.  No  mucosal thickening.      PELVIS:    Appendix:  No findings to suggest acute appendicitis.    Bladder:  See above.    Reproductive:  Unremarkable as visualized.   ABDOMEN, PELVIS and LOWER EXTREMITIES:    Intraperitoneal space:  Unremarkable as visualized.  No significant  fluid collection.  No pneumoperitoneum.    Bones/joints:  Streak artifact from fixation hardware of the left  ankle is noted.  Advanced degenerative changes lumbar spine with  multilevel stenosis and sclerotic type Modic changes  at multiple levels.  No acute bony findings identified.    Soft tissues:  Diffuse edema of the right lower extremity beginning at  the level of the hip to the level of the foot and is nonspecific, but is  likely secondary to mass-occupying process in the right iliac fossa.    Lymph nodes:  Abnormal nodular-type soft tissue in the right  retroperitoneal stations along the IVC compatible with retroperitoneal  adenopathy.     IMPRESSION:  1.  Diffuse edema of the right lower extremity beginning at the level of  the hip to the level of the foot and is nonspecific, but is likely  secondary to mass-occupying process in the right iliac fossa.  2.  Mild to moderate calcified plaque with minimal noncalcified plaque  of the distal abdominal aorta with mild medium segment stenosis. No  aneurysm or dissection identified.  3.  Marked chronic appearing right hydronephrosis is noted with ureteral  dilatation to the level of the urinary bladder where there is irregular  wall thickening of the urinary bladder with enhancement suspicious for  right posterior bladder wall urothelial neoplasm. Alternatively,  gynecologic neoplastic process with local invasion may be considered as  well extending into the right UVJ region. This would likely account for  asymmetric edema of the right lower extremity. Cystoscopy is warranted  for further evaluation. Thinning of the right renal cortex and  parenchyma would indicate chronic etiology.  4.  Moderate-advanced calcified and noncalcified plaque of the right  common iliac artery with medium segment moderate stenosis.  5.  Moderate calcified plaque with mild noncalcified plaque left common  iliac artery with mild medium segment stenosis.  6.  Abnormal nodular-type soft tissue in the right retroperitoneal  stations along the IVC compatible with retroperitoneal adenopathy.  7.  Irregular soft tissue centered on the right UVJ region of the  posterior bladder wall extending into the perirectal space  and right  iliac fossa spans approximately 5.6 x 4.0 cm.  8.  Nodular liver margins indicative of cirrhosis.  9.  Common bile duct dilatation is noted., Approximate 13.2 mm diameter.  Correlate with laboratory data.  10. Other nonacute findings above.    US Arterial Doppler Lower Extremity Bilateral (05/14/2024 11:03)   FINDINGS:    Right common femoral artery:  Peak systolic velocity in the right  common femoral artery is 126 cm/s.  Monophasic waveform.    Right superficial femoral artery:  Peak systolic velocity in the right  superficial femoral artery is 113 cm/s.  Monophasic waveform.    Right popliteal artery:  Peak systolic velocity in the right popliteal  artery is 74 cm/s.  Monophasic waveform.    Right calf/foot arteries:  Peak systolic velocity in the right  posterior tibial artery is 89 cm/s.  Monophasic waveform.    Left common femoral artery:  Peak systolic velocity in the left common  femoral artery is 83 cm/s.  Monophasic waveform.    Left superficial femoral artery:  Peak systolic velocity in the left  superficial femoral artery is 87 cm/s.  Monophasic waveform.    Left popliteal artery:  Peak systolic velocity in the left popliteal  artery is 31 cm/s.  Monophasic waveform.    Left calf/foot arteries:  Peak systolic velocity in the left posterior  tibial artery is 38 cm/s.  Monophasic waveform.  Soft tissues:  Unremarkable as visualized.     IMPRESSION:  1. No segments of significant stenosis or occlusion.  2. Monophasic waveforms indicative of diffuse arterial inflow disease.    US Venous Doppler Lower Extremity Bilateral (duplex) (05/14/2024 11:02)    FINDINGS:    Right deep veins:  Unremarkable as visualized.  No DVT.    Left deep veins:  See above.    Soft tissues:  Soft tissue edema of the right greater than left lower  extremities.  Distal right SFV poorly visualized due to the extent of  edema in the soft tissues.  No popliteal cyst.    Lymph nodes:  Reactive appearing right inguinal lymph  node is noted.     IMPRESSION:  1.  No DVT.  2.  Soft tissue edema of the right greater than left lower extremities.  3.  Reactive appearing right inguinal lymph node is noted.        Pathology:   06/06/2024 05/20/2024          Assessment / Plan         Assessment and Plan   Anna Moore is a 54 y.o. presents for     Cervical cancer   Cancer Staging   FIGO Stage IIIB (cT3b, cNX, cM0)  -Patient presents with lower extremity swelling thought to be due to lymphedema, CT angio of the abdomen and lower extremity showed right hydronephrosis, ureteral dilation to the level of urinary bladder with bladder wall thickening.  She underwent TURP, with pathology showing squamous cell carcinoma, p16 positive, involving submucosal tissue of the bladder.  - Establish care with Dr. Benedict (gynecologic).  Not a surgical candidate, recommend curative chemotherapy with radiation followed by brachytherapy at [facility] under Dr. Panchal.  -She was unable to get PET/CT and MRI of the pelvis due to increased pain and inability to lie flat.  -We discussed chemotherapy today with cisplatin 40 mg/m² weekly for 5-6 cycles with concurrent radiation, with pembrolizumab 200 mg every 3 weeks for 5 cycles with chemoradiation followed by pembrolizumab 400 mg every 6 weeks for 15 cycles, based on KEYNOTE A-18 which has shown increased progression free survival (hazard ratio 0.59 and 21% of patients and pembrolizumab carbo), overall survival data is not mature.  Adverse effects were discussed including fatigue, nausea, vomiting, peripheral neuropathy, alopecia, myelosuppression, thyroiditis, pneumonitis, colitis, myocarditis.  Patient is agreeable to therapy.  Chemo expert handout is provided.  -Recommend staging studies with CT chest, abdomen, pelvis  -She is interested in port placement, referred to general surgery  -Chemotherapy baseline labs ordered today    2.  Malignancy associated pain  -She reports significant pain in her  right hip, with inability to lay flat  -Currently receiving Suboxone due to history of drug abuse, with Shr[facility]ari at [facility].  Recommend to continue care  -Gabapentin 400 mg increased to 3 times daily daily    3.  Nutrition  -Recommend increasing calories and protein through out treatment course    4.  Lower extremity swelling  -Patient requested antibiotics, we reviewed I do not think she has cellulitis, lower extremity swelling due to pelvis mass  -Due to her request, prescribed Keflex 400 mg 4 times a day for 14 days    5.  Current smoker  -Counseled on smoking cessation, patient is not interested in smoking cessation modalities at this time              Discussed possible differential diagnoses, testing, treatment, recommended non-pharmacological interventions, risks, warning signs to monitor for that would indicate need for follow-up in clinic or ER. If no improvement with these regimens or you have new or worsening symptoms follow-up. Patient verbalizes understanding and agreement with plan of care. Denies further needs or concerns.     Patient was given instructions and counseling regarding her condition and for health maintenance advised.       All questions were answered to her satisfaction.      ACO / PIOTR/Other  Quality measures  -  Anna Moore did not receive 2023 flu vaccine.  This was recommended.  -  Anna Moore reports a pain score of 8.  Given her pain assessment as noted, treatment options were discussed and the following options were decided upon as a follow-up plan to address the patient's pain: continuation of current treatment plan for pain.  -  Current outpatient and discharge medications have been reconciled for the patient.  Reviewed by: Ankita Uriarte MD        Meds ordered during this visit  New Medications Ordered This Visit   Medications    gabapentin (NEURONTIN) 400 MG capsule     Sig: Take 1 capsule by mouth 3 (Three) Times a Day.     Dispense:  90 capsule     Refill:  0     cephalexin (Keflex) 500 MG capsule     Sig: Take 1 capsule by mouth 4 (Four) Times a Day for 14 days.     Dispense:  56 capsule     Refill:  0       Visit Diagnoses    ICD-10-CM ICD-9-CM   1. Malignant neoplasm of cervix, unspecified site  C53.9 180.9       Follow Up   Prior to cycle 2 of chemotherapy        This document has been electronically signed by Ankita Uriarte MD   August 19, 2024 12:07 EDT    Dictated Utilizing Dragon Dictation: Part of this note may be an electronic transcription/translation of spoken language to printed text using the Dragon Dictation System.

## 2024-08-15 ENCOUNTER — DOCUMENTATION (OUTPATIENT)
Dept: ONCOLOGY | Facility: CLINIC | Age: 54
End: 2024-08-15
Payer: MEDICAID

## 2024-08-15 DIAGNOSIS — C53.9 CERVICAL CANCER, FIGO STAGE IIIB: Primary | ICD-10-CM

## 2024-08-16 ENCOUNTER — DOCUMENTATION (OUTPATIENT)
Dept: ONCOLOGY | Facility: CLINIC | Age: 54
End: 2024-08-16
Payer: MEDICAID

## 2024-08-16 NOTE — PROGRESS NOTES
SS spoke with patient as initial encounter. The role of SS was introduced and contact information was provided.     Please refer to the Social Work flowsheet for assessment details.    SS provided patient with contact information and encouraged patient to call with any questions, concerns, or needs.     Patient verbalized understanding and agreed with resource assistance and plan.    SS will follow and assist as needed

## 2024-08-19 ENCOUNTER — DOCUMENTATION (OUTPATIENT)
Dept: ONCOLOGY | Facility: CLINIC | Age: 54
End: 2024-08-19
Payer: COMMERCIAL

## 2024-08-19 ENCOUNTER — LAB (OUTPATIENT)
Dept: ONCOLOGY | Facility: CLINIC | Age: 54
End: 2024-08-19
Payer: COMMERCIAL

## 2024-08-19 ENCOUNTER — CONSULT (OUTPATIENT)
Dept: ONCOLOGY | Facility: CLINIC | Age: 54
End: 2024-08-19
Payer: COMMERCIAL

## 2024-08-19 VITALS
WEIGHT: 150.4 LBS | HEART RATE: 107 BPM | OXYGEN SATURATION: 96 % | HEIGHT: 63 IN | RESPIRATION RATE: 20 BRPM | TEMPERATURE: 98 F | DIASTOLIC BLOOD PRESSURE: 76 MMHG | SYSTOLIC BLOOD PRESSURE: 140 MMHG | BODY MASS INDEX: 26.65 KG/M2

## 2024-08-19 DIAGNOSIS — C53.9 MALIGNANT NEOPLASM OF CERVIX, UNSPECIFIED SITE: Primary | ICD-10-CM

## 2024-08-19 DIAGNOSIS — C53.9 MALIGNANT NEOPLASM OF CERVIX, UNSPECIFIED SITE: ICD-10-CM

## 2024-08-19 LAB
ALBUMIN SERPL-MCNC: 3.8 G/DL (ref 3.5–5.2)
ALBUMIN/GLOB SERPL: 0.9 G/DL
ALP SERPL-CCNC: 98 U/L (ref 39–117)
ALT SERPL W P-5'-P-CCNC: 6 U/L (ref 1–33)
ANION GAP SERPL CALCULATED.3IONS-SCNC: 10.2 MMOL/L (ref 5–15)
AST SERPL-CCNC: 13 U/L (ref 1–32)
BASOPHILS # BLD AUTO: 0.14 10*3/MM3 (ref 0–0.2)
BASOPHILS NFR BLD AUTO: 1 % (ref 0–1.5)
BILIRUB SERPL-MCNC: 0.2 MG/DL (ref 0–1.2)
BUN SERPL-MCNC: 7 MG/DL (ref 6–20)
BUN/CREAT SERPL: 8.3 (ref 7–25)
CALCIUM SPEC-SCNC: 9.9 MG/DL (ref 8.6–10.5)
CHLORIDE SERPL-SCNC: 98 MMOL/L (ref 98–107)
CO2 SERPL-SCNC: 25.8 MMOL/L (ref 22–29)
CREAT SERPL-MCNC: 0.84 MG/DL (ref 0.57–1)
DEPRECATED RDW RBC AUTO: 48.3 FL (ref 37–54)
EGFRCR SERPLBLD CKD-EPI 2021: 83.2 ML/MIN/1.73
EOSINOPHIL # BLD AUTO: 0.59 10*3/MM3 (ref 0–0.4)
EOSINOPHIL NFR BLD AUTO: 4.4 % (ref 0.3–6.2)
ERYTHROCYTE [DISTWIDTH] IN BLOOD BY AUTOMATED COUNT: 14.4 % (ref 12.3–15.4)
GLOBULIN UR ELPH-MCNC: 4.2 GM/DL
GLUCOSE SERPL-MCNC: 102 MG/DL (ref 65–99)
HCT VFR BLD AUTO: 39.4 % (ref 34–46.6)
HGB BLD-MCNC: 12.7 G/DL (ref 12–15.9)
IMM GRANULOCYTES # BLD AUTO: 0.03 10*3/MM3 (ref 0–0.05)
IMM GRANULOCYTES NFR BLD AUTO: 0.2 % (ref 0–0.5)
LYMPHOCYTES # BLD AUTO: 1.95 10*3/MM3 (ref 0.7–3.1)
LYMPHOCYTES NFR BLD AUTO: 14.4 % (ref 19.6–45.3)
MCH RBC QN AUTO: 29.7 PG (ref 26.6–33)
MCHC RBC AUTO-ENTMCNC: 32.2 G/DL (ref 31.5–35.7)
MCV RBC AUTO: 92.1 FL (ref 79–97)
MONOCYTES # BLD AUTO: 1.37 10*3/MM3 (ref 0.1–0.9)
MONOCYTES NFR BLD AUTO: 10.1 % (ref 5–12)
NEUTROPHILS NFR BLD AUTO: 69.9 % (ref 42.7–76)
NEUTROPHILS NFR BLD AUTO: 9.42 10*3/MM3 (ref 1.7–7)
NRBC BLD AUTO-RTO: 0 /100 WBC (ref 0–0.2)
PLATELET # BLD AUTO: 359 10*3/MM3 (ref 140–450)
PMV BLD AUTO: 9.6 FL (ref 6–12)
POTASSIUM SERPL-SCNC: 4.5 MMOL/L (ref 3.5–5.2)
PROT SERPL-MCNC: 8 G/DL (ref 6–8.5)
RBC # BLD AUTO: 4.28 10*6/MM3 (ref 3.77–5.28)
SODIUM SERPL-SCNC: 134 MMOL/L (ref 136–145)
WBC NRBC COR # BLD AUTO: 13.5 10*3/MM3 (ref 3.4–10.8)

## 2024-08-19 PROCEDURE — 99205 OFFICE O/P NEW HI 60 MIN: CPT | Performed by: INTERNAL MEDICINE

## 2024-08-19 PROCEDURE — 85025 COMPLETE CBC W/AUTO DIFF WBC: CPT | Performed by: INTERNAL MEDICINE

## 2024-08-19 PROCEDURE — 80053 COMPREHEN METABOLIC PANEL: CPT | Performed by: INTERNAL MEDICINE

## 2024-08-19 PROCEDURE — 1125F AMNT PAIN NOTED PAIN PRSNT: CPT | Performed by: INTERNAL MEDICINE

## 2024-08-19 RX ORDER — GABAPENTIN 400 MG/1
400 CAPSULE ORAL 3 TIMES DAILY
Qty: 90 CAPSULE | Refills: 0 | Status: SHIPPED | OUTPATIENT
Start: 2024-08-19

## 2024-08-19 RX ORDER — CEPHALEXIN 500 MG/1
500 CAPSULE ORAL 4 TIMES DAILY
Qty: 56 CAPSULE | Refills: 0 | Status: SHIPPED | OUTPATIENT
Start: 2024-08-19 | End: 2024-09-02

## 2024-08-19 NOTE — PROGRESS NOTES
Venipuncture Blood Specimen Collection  Venipuncture performed in right arm by Marilee Perez MA with good hemostasis. Patient tolerated the procedure well without complications.   08/19/24   Marilee Perez MA

## 2024-08-19 NOTE — PROGRESS NOTES
Please refer to the Social Work flowsheet for assessment details.    SS will follow and assist as needed.

## 2024-08-20 ENCOUNTER — DOCUMENTATION (OUTPATIENT)
Dept: ONCOLOGY | Facility: CLINIC | Age: 54
End: 2024-08-20
Payer: COMMERCIAL

## 2024-08-20 ENCOUNTER — OFFICE VISIT (OUTPATIENT)
Dept: SURGERY | Facility: CLINIC | Age: 54
End: 2024-08-20
Payer: COMMERCIAL

## 2024-08-20 ENCOUNTER — PATIENT ROUNDING (BHMG ONLY) (OUTPATIENT)
Dept: SURGERY | Facility: CLINIC | Age: 54
End: 2024-08-20
Payer: COMMERCIAL

## 2024-08-20 VITALS — BODY MASS INDEX: 26.58 KG/M2 | HEIGHT: 63 IN | WEIGHT: 150 LBS

## 2024-08-20 DIAGNOSIS — C53.9 CERVICAL CANCER, FIGO STAGE IIIB: Primary | ICD-10-CM

## 2024-08-20 PROCEDURE — 99203 OFFICE O/P NEW LOW 30 MIN: CPT | Performed by: SURGERY

## 2024-08-20 PROCEDURE — 1159F MED LIST DOCD IN RCRD: CPT | Performed by: SURGERY

## 2024-08-20 PROCEDURE — 1160F RVW MEDS BY RX/DR IN RCRD: CPT | Performed by: SURGERY

## 2024-08-20 NOTE — PROGRESS NOTES
August 20, 2024    Hello, may I speak with Anna Moore?    My name is MARISABEL      I am  with [facility]  [address] [facility] [place] [address] [phone].    Before we get started may I verify your date of birth? 1970    I am calling to officially welcome you to our practice and ask about your recent visit. Is this a good time to talk? yes    Tell me about your visit with us. What things went well?  PERFECT VISIT EVERYONE IS SO NICE        We're always looking for ways to make our patients' experiences even better. Do you have recommendations on ways we may improve?  no    Overall were you satisfied with your first visit to our practice? yes       I appreciate you taking the time to speak with me today. Is there anything else I can do for you? no      Thank you, and have a great day.

## 2024-08-20 NOTE — H&P (VIEW-ONLY)
Subjective   Anna Moore is a 54 y.o. female.     Chief Complaint: port placement    History of Present Illness She is a 55 yo who has cervical cancer and needs a port placed. She has had a right clavicle fracture repaired with hardware in the past.    The following portions of the patient's history were reviewed and updated as appropriate: current medications, past family history, past medical history, past social history, past surgical history and problem list.    Review of Systems   Constitutional:  Negative for activity change, appetite change, chills, fever and unexpected weight change.   HENT:  Negative for congestion, facial swelling and sore throat.    Eyes:  Negative for photophobia and visual disturbance.   Respiratory:  Negative for chest tightness, shortness of breath and wheezing.    Cardiovascular:  Negative for chest pain, palpitations and leg swelling.   Gastrointestinal:  Negative for abdominal distention, abdominal pain, anal bleeding, blood in stool, constipation, diarrhea, nausea, rectal pain and vomiting.   Endocrine: Negative for cold intolerance, heat intolerance, polydipsia and polyuria.   Genitourinary:  Negative for difficulty urinating, dysuria, flank pain and urgency.   Musculoskeletal:  Negative for back pain and myalgias.   Skin:  Negative for rash and wound.   Allergic/Immunologic: Negative for immunocompromised state.   Neurological:  Negative for dizziness, seizures, syncope, light-headedness, numbness and headaches.   Hematological:  Negative for adenopathy. Does not bruise/bleed easily.   Psychiatric/Behavioral:  Negative for behavioral problems and confusion. The patient is not nervous/anxious.        Objective   Physical Exam  Vitals reviewed.   Constitutional:       General: She is not in acute distress.     Appearance: She is well-developed. She is not ill-appearing.   HENT:      Head: Normocephalic. No laceration. Hair is normal.      Right Ear: Hearing and ear canal normal.       Left Ear: Hearing and ear canal normal.      Nose: Nose normal.      Right Sinus: No maxillary sinus tenderness or frontal sinus tenderness.      Left Sinus: No maxillary sinus tenderness or frontal sinus tenderness.   Eyes:      General: Lids are normal.      Conjunctiva/sclera: Conjunctivae normal.      Pupils: Pupils are equal, round, and reactive to light.   Neck:      Thyroid: No thyroid mass or thyromegaly.      Vascular: No JVD.      Trachea: No tracheal tenderness or tracheal deviation.   Cardiovascular:      Rate and Rhythm: Normal rate and regular rhythm.      Heart sounds: No murmur heard.     No gallop.   Pulmonary:      Effort: Pulmonary effort is normal.      Breath sounds: Normal breath sounds. No stridor. No wheezing.   Chest:      Chest wall: No tenderness.   Abdominal:      General: Bowel sounds are normal. There is no distension.      Palpations: Abdomen is soft. There is no mass.      Tenderness: There is no abdominal tenderness. There is no guarding or rebound.      Hernia: No hernia is present.   Musculoskeletal:         General: No deformity.      Cervical back: Normal range of motion.   Lymphadenopathy:      Cervical: No cervical adenopathy.      Upper Body:      Right upper body: No supraclavicular adenopathy.      Left upper body: No supraclavicular adenopathy.   Skin:     General: Skin is warm and dry.      Coloration: Skin is not pale.      Findings: No erythema or rash.   Neurological:      Mental Status: She is alert and oriented to person, place, and time.      Motor: No abnormal muscle tone.   Psychiatric:         Behavior: Behavior normal.         Thought Content: Thought content normal.         Past Medical History:   Diagnosis Date    Abdominal pain     Abnormal vaginal bleeding     Anxiety     Arthritis     Back pain     Bilateral swelling of feet     Cancer 2024    bladder    Cervical cancer     COPD (chronic obstructive pulmonary disease)     Hypertension     Joint pain      Muscle pain     Psychosis     Seizures     last seizure x2 year ago    Substance abuse        Family History   Problem Relation Age of Onset    Heart disease Father     Cancer Mother     Lung cancer Mother        Social History     Tobacco Use    Smoking status: Every Day     Current packs/day: 1.50     Types: Cigarettes     Passive exposure: Current    Smokeless tobacco: Never   Vaping Use    Vaping status: Never Used   Substance Use Topics    Alcohol use: Not Currently     Comment: denies at this time.    Drug use: Defer     Types: Other, Marijuana     Comment: DENIES drug use but on suboxone. States she used xanax       Past Surgical History:   Procedure Laterality Date    ANKLE SURGERY      APPENDECTOMY      CLAVICLE SURGERY Right     TONSILLECTOMY      TRANSURETHRAL RESECTION OF BLADDER TUMOR N/A 5/20/2024    Procedure: CYSTOSCOPY TRANSURETHRAL RESECTION OF BLADDER TUMOR;  Surgeon: Ron Alves MD;  Location: [facility];  Service: Urology;  Laterality: N/A;       Current Outpatient Medications   Medication Instructions    acetaminophen (TYLENOL) 650 mg, Oral, Every 6 Hours PRN    buprenorphine-naloxone (SUBOXONE) 8-2 MG per SL tablet 1 tablet    cephalexin (KEFLEX) 500 mg, Oral, 4 Times Daily    FLUoxetine (PROZAC) 40 mg, Oral, Daily    gabapentin (NEURONTIN) 400 mg, Oral, 3 Times Daily    ibuprofen (ADVIL,MOTRIN) 600 mg, Oral, Every 6 Hours PRN    OLANZapine (ZYPREXA) 20 mg, Oral, Nightly    oxyCODONE (ROXICODONE) 5 mg, Oral, Every 6 Hours PRN    oxyCODONE-acetaminophen (Percocet)  MG per tablet 1 tablet, Oral, Every 6 Hours PRN    prazosin (MINIPRESS) 2 mg, Oral, Nightly    QUEtiapine (SEROQUEL) 200 mg, Oral, Nightly    traZODone (DESYREL) 100 mg, Oral, Nightly PRN         Assessment & Plan   Diagnoses and all orders for this visit:    1. Cervical cancer, FIGO stage IIIB (Primary)    Place port on the left side             This document has been electronically signed by Shaji Bryant MD    August 20, 2024 10:19 EDT

## 2024-08-20 NOTE — PROGRESS NOTES
Subjective   Anna Moore is a 54 y.o. female.     Chief Complaint: port placement    History of Present Illness She is a 53 yo who has cervical cancer and needs a port placed. She has had a right clavicle fracture repaired with hardware in the past.    The following portions of the patient's history were reviewed and updated as appropriate: current medications, past family history, past medical history, past social history, past surgical history and problem list.    Review of Systems   Constitutional:  Negative for activity change, appetite change, chills, fever and unexpected weight change.   HENT:  Negative for congestion, facial swelling and sore throat.    Eyes:  Negative for photophobia and visual disturbance.   Respiratory:  Negative for chest tightness, shortness of breath and wheezing.    Cardiovascular:  Negative for chest pain, palpitations and leg swelling.   Gastrointestinal:  Negative for abdominal distention, abdominal pain, anal bleeding, blood in stool, constipation, diarrhea, nausea, rectal pain and vomiting.   Endocrine: Negative for cold intolerance, heat intolerance, polydipsia and polyuria.   Genitourinary:  Negative for difficulty urinating, dysuria, flank pain and urgency.   Musculoskeletal:  Negative for back pain and myalgias.   Skin:  Negative for rash and wound.   Allergic/Immunologic: Negative for immunocompromised state.   Neurological:  Negative for dizziness, seizures, syncope, light-headedness, numbness and headaches.   Hematological:  Negative for adenopathy. Does not bruise/bleed easily.   Psychiatric/Behavioral:  Negative for behavioral problems and confusion. The patient is not nervous/anxious.        Objective   Physical Exam  Vitals reviewed.   Constitutional:       General: She is not in acute distress.     Appearance: She is well-developed. She is not ill-appearing.   HENT:      Head: Normocephalic. No laceration. Hair is normal.      Right Ear: Hearing and ear canal normal.       Left Ear: Hearing and ear canal normal.      Nose: Nose normal.      Right Sinus: No maxillary sinus tenderness or frontal sinus tenderness.      Left Sinus: No maxillary sinus tenderness or frontal sinus tenderness.   Eyes:      General: Lids are normal.      Conjunctiva/sclera: Conjunctivae normal.      Pupils: Pupils are equal, round, and reactive to light.   Neck:      Thyroid: No thyroid mass or thyromegaly.      Vascular: No JVD.      Trachea: No tracheal tenderness or tracheal deviation.   Cardiovascular:      Rate and Rhythm: Normal rate and regular rhythm.      Heart sounds: No murmur heard.     No gallop.   Pulmonary:      Effort: Pulmonary effort is normal.      Breath sounds: Normal breath sounds. No stridor. No wheezing.   Chest:      Chest wall: No tenderness.   Abdominal:      General: Bowel sounds are normal. There is no distension.      Palpations: Abdomen is soft. There is no mass.      Tenderness: There is no abdominal tenderness. There is no guarding or rebound.      Hernia: No hernia is present.   Musculoskeletal:         General: No deformity.      Cervical back: Normal range of motion.   Lymphadenopathy:      Cervical: No cervical adenopathy.      Upper Body:      Right upper body: No supraclavicular adenopathy.      Left upper body: No supraclavicular adenopathy.   Skin:     General: Skin is warm and dry.      Coloration: Skin is not pale.      Findings: No erythema or rash.   Neurological:      Mental Status: She is alert and oriented to person, place, and time.      Motor: No abnormal muscle tone.   Psychiatric:         Behavior: Behavior normal.         Thought Content: Thought content normal.         Past Medical History:   Diagnosis Date    Abdominal pain     Abnormal vaginal bleeding     Anxiety     Arthritis     Back pain     Bilateral swelling of feet     Cancer 2024    bladder    Cervical cancer     COPD (chronic obstructive pulmonary disease)     Hypertension     Joint pain      Muscle pain     Psychosis     Seizures     last seizure x2 year ago    Substance abuse        Family History   Problem Relation Age of Onset    Heart disease Father     Cancer Mother     Lung cancer Mother        Social History     Tobacco Use    Smoking status: Every Day     Current packs/day: 1.50     Types: Cigarettes     Passive exposure: Current    Smokeless tobacco: Never   Vaping Use    Vaping status: Never Used   Substance Use Topics    Alcohol use: Not Currently     Comment: denies at this time.    Drug use: Defer     Types: Other, Marijuana     Comment: DENIES drug use but on suboxone. States she used xanax       Past Surgical History:   Procedure Laterality Date    ANKLE SURGERY      APPENDECTOMY      CLAVICLE SURGERY Right     TONSILLECTOMY      TRANSURETHRAL RESECTION OF BLADDER TUMOR N/A 5/20/2024    Procedure: CYSTOSCOPY TRANSURETHRAL RESECTION OF BLADDER TUMOR;  Surgeon: Ron Alves MD;  Location: [facility];  Service: Urology;  Laterality: N/A;       Current Outpatient Medications   Medication Instructions    acetaminophen (TYLENOL) 650 mg, Oral, Every 6 Hours PRN    buprenorphine-naloxone (SUBOXONE) 8-2 MG per SL tablet 1 tablet    cephalexin (KEFLEX) 500 mg, Oral, 4 Times Daily    FLUoxetine (PROZAC) 40 mg, Oral, Daily    gabapentin (NEURONTIN) 400 mg, Oral, 3 Times Daily    ibuprofen (ADVIL,MOTRIN) 600 mg, Oral, Every 6 Hours PRN    OLANZapine (ZYPREXA) 20 mg, Oral, Nightly    oxyCODONE (ROXICODONE) 5 mg, Oral, Every 6 Hours PRN    oxyCODONE-acetaminophen (Percocet)  MG per tablet 1 tablet, Oral, Every 6 Hours PRN    prazosin (MINIPRESS) 2 mg, Oral, Nightly    QUEtiapine (SEROQUEL) 200 mg, Oral, Nightly    traZODone (DESYREL) 100 mg, Oral, Nightly PRN         Assessment & Plan   Diagnoses and all orders for this visit:    1. Cervical cancer, FIGO stage IIIB (Primary)    Place port on the left side             This document has been electronically signed by Shaji Bryant MD    August 20, 2024 10:19 EDT

## 2024-08-21 ENCOUNTER — DOCUMENTATION (OUTPATIENT)
Dept: ONCOLOGY | Facility: CLINIC | Age: 54
End: 2024-08-21
Payer: COMMERCIAL

## 2024-08-22 ENCOUNTER — HOSPITAL ENCOUNTER (OUTPATIENT)
Facility: HOSPITAL | Age: 54
Setting detail: HOSPITAL OUTPATIENT SURGERY
Discharge: HOME | End: 2024-08-22
Attending: SURGERY | Admitting: SURGERY
Payer: COMMERCIAL

## 2024-08-22 ENCOUNTER — APPOINTMENT (OUTPATIENT)
Dept: GENERAL RADIOLOGY | Facility: HOSPITAL | Age: 54
End: 2024-08-22
Payer: COMMERCIAL

## 2024-08-22 ENCOUNTER — ANESTHESIA EVENT (OUTPATIENT)
Dept: PERIOP | Facility: HOSPITAL | Age: 54
End: 2024-08-22
Payer: COMMERCIAL

## 2024-08-22 ENCOUNTER — ANESTHESIA (OUTPATIENT)
Dept: PERIOP | Facility: HOSPITAL | Age: 54
End: 2024-08-22
Payer: COMMERCIAL

## 2024-08-22 VITALS
WEIGHT: 149 LBS | SYSTOLIC BLOOD PRESSURE: 160 MMHG | BODY MASS INDEX: 26.4 KG/M2 | TEMPERATURE: 97.5 F | DIASTOLIC BLOOD PRESSURE: 97 MMHG | RESPIRATION RATE: 18 BRPM | HEIGHT: 63 IN | HEART RATE: 94 BPM | OXYGEN SATURATION: 95 %

## 2024-08-22 DIAGNOSIS — C53.9 CERVICAL CANCER, FIGO STAGE IIIB: Primary | ICD-10-CM

## 2024-08-22 LAB
B-HCG UR QL: NEGATIVE
EXPIRATION DATE: NORMAL
INTERNAL NEGATIVE CONTROL: NEGATIVE
INTERNAL POSITIVE CONTROL: POSITIVE
Lab: NORMAL

## 2024-08-22 PROCEDURE — 25010000002 HEPARIN LOCK FLUSH PER 10 UNITS: Performed by: SURGERY

## 2024-08-22 PROCEDURE — 25010000002 BUPIVACAINE 0.5 % SOLUTION: Performed by: SURGERY

## 2024-08-22 PROCEDURE — 36561 INSERT TUNNELED CV CATH: CPT | Performed by: SURGERY

## 2024-08-22 PROCEDURE — 25010000002 DEXAMETHASONE PER 1 MG: Performed by: NURSE ANESTHETIST, CERTIFIED REGISTERED

## 2024-08-22 PROCEDURE — 71045 X-RAY EXAM CHEST 1 VIEW: CPT

## 2024-08-22 PROCEDURE — 25810000003 LACTATED RINGERS PER 1000 ML: Performed by: NURSE ANESTHETIST, CERTIFIED REGISTERED

## 2024-08-22 PROCEDURE — 71045 X-RAY EXAM CHEST 1 VIEW: CPT | Performed by: RADIOLOGY

## 2024-08-22 PROCEDURE — 25810000003 LACTATED RINGERS PER 1000 ML: Performed by: ANESTHESIOLOGY

## 2024-08-22 PROCEDURE — 25010000002 ONDANSETRON PER 1 MG: Performed by: NURSE ANESTHETIST, CERTIFIED REGISTERED

## 2024-08-22 PROCEDURE — C1788 PORT, INDWELLING, IMP: HCPCS | Performed by: SURGERY

## 2024-08-22 PROCEDURE — 76000 FLUOROSCOPY <1 HR PHYS/QHP: CPT

## 2024-08-22 PROCEDURE — 25010000002 FENTANYL CITRATE (PF) 50 MCG/ML SOLUTION: Performed by: NURSE ANESTHETIST, CERTIFIED REGISTERED

## 2024-08-22 PROCEDURE — 25010000002 MIDAZOLAM PER 1 MG: Performed by: NURSE ANESTHETIST, CERTIFIED REGISTERED

## 2024-08-22 PROCEDURE — 25010000002 PROPOFOL 200 MG/20ML EMULSION: Performed by: NURSE ANESTHETIST, CERTIFIED REGISTERED

## 2024-08-22 PROCEDURE — 25010000002 KETOROLAC TROMETHAMINE PER 15 MG: Performed by: NURSE ANESTHETIST, CERTIFIED REGISTERED

## 2024-08-22 PROCEDURE — 81025 URINE PREGNANCY TEST: CPT | Performed by: ANESTHESIOLOGY

## 2024-08-22 DEVICE — POWERPORT CLEARVUE ISP IMPLANTABLE PORT WITH ATTACHABLE 8F POLYURETHANE OPEN-ENDED SINGLE-LUMEN VENOUS CATHETER PROCEDURAL KIT
Type: IMPLANTABLE DEVICE | Site: SUBCLAVIAN | Status: FUNCTIONAL
Brand: POWERPORT CLEARVUE

## 2024-08-22 RX ORDER — SODIUM CHLORIDE 9 MG/ML
40 INJECTION, SOLUTION INTRAVENOUS AS NEEDED
Status: DISCONTINUED | OUTPATIENT
Start: 2024-08-22 | End: 2024-08-22 | Stop reason: HOSPADM

## 2024-08-22 RX ORDER — PALONOSETRON 0.05 MG/ML
0.25 INJECTION, SOLUTION INTRAVENOUS ONCE
OUTPATIENT
Start: 2024-08-26

## 2024-08-22 RX ORDER — SODIUM CHLORIDE 0.9 % (FLUSH) 0.9 %
10 SYRINGE (ML) INJECTION AS NEEDED
Status: DISCONTINUED | OUTPATIENT
Start: 2024-08-22 | End: 2024-08-22 | Stop reason: HOSPADM

## 2024-08-22 RX ORDER — PALONOSETRON 0.05 MG/ML
0.25 INJECTION, SOLUTION INTRAVENOUS ONCE
OUTPATIENT
Start: 2024-08-22

## 2024-08-22 RX ORDER — SODIUM CHLORIDE 9 MG/ML
20 INJECTION, SOLUTION INTRAVENOUS ONCE
OUTPATIENT
Start: 2024-08-22

## 2024-08-22 RX ORDER — BUPIVACAINE HYDROCHLORIDE 5 MG/ML
INJECTION, SOLUTION PERINEURAL AS NEEDED
Status: DISCONTINUED | OUTPATIENT
Start: 2024-08-22 | End: 2024-08-22 | Stop reason: HOSPADM

## 2024-08-22 RX ORDER — MAGNESIUM HYDROXIDE 1200 MG/15ML
LIQUID ORAL AS NEEDED
Status: DISCONTINUED | OUTPATIENT
Start: 2024-08-22 | End: 2024-08-22 | Stop reason: HOSPADM

## 2024-08-22 RX ORDER — KETOROLAC TROMETHAMINE 30 MG/ML
INJECTION, SOLUTION INTRAMUSCULAR; INTRAVENOUS AS NEEDED
Status: DISCONTINUED | OUTPATIENT
Start: 2024-08-22 | End: 2024-08-22 | Stop reason: SURG

## 2024-08-22 RX ORDER — MEPERIDINE HYDROCHLORIDE 25 MG/ML
12.5 INJECTION INTRAMUSCULAR; INTRAVENOUS; SUBCUTANEOUS
Status: DISCONTINUED | OUTPATIENT
Start: 2024-08-22 | End: 2024-08-22 | Stop reason: HOSPADM

## 2024-08-22 RX ORDER — PROPOFOL 10 MG/ML
INJECTION, EMULSION INTRAVENOUS AS NEEDED
Status: DISCONTINUED | OUTPATIENT
Start: 2024-08-22 | End: 2024-08-22 | Stop reason: SURG

## 2024-08-22 RX ORDER — FAMOTIDINE 10 MG/ML
INJECTION, SOLUTION INTRAVENOUS AS NEEDED
Status: DISCONTINUED | OUTPATIENT
Start: 2024-08-22 | End: 2024-08-22 | Stop reason: SURG

## 2024-08-22 RX ORDER — FENTANYL CITRATE 50 UG/ML
INJECTION, SOLUTION INTRAMUSCULAR; INTRAVENOUS AS NEEDED
Status: DISCONTINUED | OUTPATIENT
Start: 2024-08-22 | End: 2024-08-22 | Stop reason: SURG

## 2024-08-22 RX ORDER — SODIUM CHLORIDE 0.9 % (FLUSH) 0.9 %
10 SYRINGE (ML) INJECTION EVERY 12 HOURS SCHEDULED
Status: DISCONTINUED | OUTPATIENT
Start: 2024-08-22 | End: 2024-08-22 | Stop reason: HOSPADM

## 2024-08-22 RX ORDER — HEPARIN SODIUM (PORCINE) LOCK FLUSH IV SOLN 100 UNIT/ML 100 UNIT/ML
SOLUTION INTRAVENOUS AS NEEDED
Status: DISCONTINUED | OUTPATIENT
Start: 2024-08-22 | End: 2024-08-22 | Stop reason: HOSPADM

## 2024-08-22 RX ORDER — DEXAMETHASONE SODIUM PHOSPHATE 4 MG/ML
INJECTION, SOLUTION INTRA-ARTICULAR; INTRALESIONAL; INTRAMUSCULAR; INTRAVENOUS; SOFT TISSUE AS NEEDED
Status: DISCONTINUED | OUTPATIENT
Start: 2024-08-22 | End: 2024-08-22 | Stop reason: SURG

## 2024-08-22 RX ORDER — FENTANYL CITRATE 50 UG/ML
50 INJECTION, SOLUTION INTRAMUSCULAR; INTRAVENOUS
Status: DISCONTINUED | OUTPATIENT
Start: 2024-08-22 | End: 2024-08-22 | Stop reason: HOSPADM

## 2024-08-22 RX ORDER — SODIUM CHLORIDE 9 MG/ML
20 INJECTION, SOLUTION INTRAVENOUS ONCE
OUTPATIENT
Start: 2024-09-02

## 2024-08-22 RX ORDER — SODIUM CHLORIDE, SODIUM LACTATE, POTASSIUM CHLORIDE, CALCIUM CHLORIDE 600; 310; 30; 20 MG/100ML; MG/100ML; MG/100ML; MG/100ML
INJECTION, SOLUTION INTRAVENOUS CONTINUOUS PRN
Status: DISCONTINUED | OUTPATIENT
Start: 2024-08-22 | End: 2024-08-22 | Stop reason: SURG

## 2024-08-22 RX ORDER — KETOROLAC TROMETHAMINE 30 MG/ML
30 INJECTION, SOLUTION INTRAMUSCULAR; INTRAVENOUS EVERY 6 HOURS PRN
Status: DISCONTINUED | OUTPATIENT
Start: 2024-08-22 | End: 2024-08-22 | Stop reason: HOSPADM

## 2024-08-22 RX ORDER — LIDOCAINE HYDROCHLORIDE 20 MG/ML
INJECTION, SOLUTION EPIDURAL; INFILTRATION; INTRACAUDAL; PERINEURAL AS NEEDED
Status: DISCONTINUED | OUTPATIENT
Start: 2024-08-22 | End: 2024-08-22 | Stop reason: SURG

## 2024-08-22 RX ORDER — SODIUM CHLORIDE, SODIUM LACTATE, POTASSIUM CHLORIDE, CALCIUM CHLORIDE 600; 310; 30; 20 MG/100ML; MG/100ML; MG/100ML; MG/100ML
100 INJECTION, SOLUTION INTRAVENOUS ONCE AS NEEDED
Status: DISCONTINUED | OUTPATIENT
Start: 2024-08-22 | End: 2024-08-22 | Stop reason: HOSPADM

## 2024-08-22 RX ORDER — SODIUM CHLORIDE 9 MG/ML
20 INJECTION, SOLUTION INTRAVENOUS ONCE
OUTPATIENT
Start: 2024-08-26

## 2024-08-22 RX ORDER — MIDAZOLAM HYDROCHLORIDE 1 MG/ML
INJECTION INTRAMUSCULAR; INTRAVENOUS AS NEEDED
Status: DISCONTINUED | OUTPATIENT
Start: 2024-08-22 | End: 2024-08-22 | Stop reason: SURG

## 2024-08-22 RX ORDER — MIDAZOLAM HYDROCHLORIDE 1 MG/ML
1 INJECTION INTRAMUSCULAR; INTRAVENOUS
Status: DISCONTINUED | OUTPATIENT
Start: 2024-08-22 | End: 2024-08-22 | Stop reason: HOSPADM

## 2024-08-22 RX ORDER — OXYCODONE AND ACETAMINOPHEN 5; 325 MG/1; MG/1
1 TABLET ORAL ONCE AS NEEDED
Status: DISCONTINUED | OUTPATIENT
Start: 2024-08-22 | End: 2024-08-22 | Stop reason: HOSPADM

## 2024-08-22 RX ORDER — PALONOSETRON 0.05 MG/ML
0.25 INJECTION, SOLUTION INTRAVENOUS ONCE
OUTPATIENT
Start: 2024-09-02

## 2024-08-22 RX ORDER — SODIUM CHLORIDE, SODIUM LACTATE, POTASSIUM CHLORIDE, CALCIUM CHLORIDE 600; 310; 30; 20 MG/100ML; MG/100ML; MG/100ML; MG/100ML
125 INJECTION, SOLUTION INTRAVENOUS ONCE
Status: COMPLETED | OUTPATIENT
Start: 2024-08-22 | End: 2024-08-22

## 2024-08-22 RX ORDER — ONDANSETRON 2 MG/ML
4 INJECTION INTRAMUSCULAR; INTRAVENOUS AS NEEDED
Status: DISCONTINUED | OUTPATIENT
Start: 2024-08-22 | End: 2024-08-22 | Stop reason: HOSPADM

## 2024-08-22 RX ORDER — IPRATROPIUM BROMIDE AND ALBUTEROL SULFATE 2.5; .5 MG/3ML; MG/3ML
3 SOLUTION RESPIRATORY (INHALATION) ONCE AS NEEDED
Status: DISCONTINUED | OUTPATIENT
Start: 2024-08-22 | End: 2024-08-22 | Stop reason: HOSPADM

## 2024-08-22 RX ORDER — ONDANSETRON 2 MG/ML
INJECTION INTRAMUSCULAR; INTRAVENOUS AS NEEDED
Status: DISCONTINUED | OUTPATIENT
Start: 2024-08-22 | End: 2024-08-22 | Stop reason: SURG

## 2024-08-22 RX ADMIN — SODIUM CHLORIDE, POTASSIUM CHLORIDE, SODIUM LACTATE AND CALCIUM CHLORIDE 125 ML/HR: 600; 310; 30; 20 INJECTION, SOLUTION INTRAVENOUS at 07:54

## 2024-08-22 RX ADMIN — DEXAMETHASONE SODIUM PHOSPHATE 4 MG: 4 INJECTION, SOLUTION INTRA-ARTICULAR; INTRALESIONAL; INTRAMUSCULAR; INTRAVENOUS; SOFT TISSUE at 08:13

## 2024-08-22 RX ADMIN — LIDOCAINE HYDROCHLORIDE 60 MG: 20 INJECTION, SOLUTION EPIDURAL; INFILTRATION; INTRACAUDAL; PERINEURAL at 08:13

## 2024-08-22 RX ADMIN — KETOROLAC TROMETHAMINE 30 MG: 30 INJECTION, SOLUTION INTRAMUSCULAR; INTRAVENOUS at 08:24

## 2024-08-22 RX ADMIN — PROPOFOL 150 MG: 10 INJECTION, EMULSION INTRAVENOUS at 08:13

## 2024-08-22 RX ADMIN — ONDANSETRON 4 MG: 2 INJECTION INTRAMUSCULAR; INTRAVENOUS at 08:13

## 2024-08-22 RX ADMIN — FENTANYL CITRATE 50 MCG: 50 INJECTION, SOLUTION INTRAMUSCULAR; INTRAVENOUS at 09:06

## 2024-08-22 RX ADMIN — SODIUM CHLORIDE, POTASSIUM CHLORIDE, SODIUM LACTATE AND CALCIUM CHLORIDE: 600; 310; 30; 20 INJECTION, SOLUTION INTRAVENOUS at 08:08

## 2024-08-22 RX ADMIN — MIDAZOLAM HYDROCHLORIDE 2 MG: 1 INJECTION, SOLUTION INTRAMUSCULAR; INTRAVENOUS at 08:10

## 2024-08-22 RX ADMIN — FENTANYL CITRATE 50 MCG: 50 INJECTION, SOLUTION INTRAMUSCULAR; INTRAVENOUS at 08:47

## 2024-08-22 RX ADMIN — FENTANYL CITRATE 100 MCG: 50 INJECTION INTRAMUSCULAR; INTRAVENOUS at 08:24

## 2024-08-22 RX ADMIN — FAMOTIDINE 20 MG: 10 INJECTION, SOLUTION INTRAVENOUS at 08:13

## 2024-08-22 RX ADMIN — FENTANYL CITRATE 100 MCG: 50 INJECTION INTRAMUSCULAR; INTRAVENOUS at 08:13

## 2024-08-22 NOTE — ANESTHESIA PROCEDURE NOTES
Airway  Urgency: elective    Date/Time: 8/22/2024 8:15 AM    General Information and Staff    Patient location during procedure: OR  CRNA/CAA: Lisbeth Barton CRNA    Indications and Patient Condition    Preoxygenated: yes      Final Airway Details  Final airway type: supraglottic airway      Successful airway: unique  Size 4     Number of attempts at approach: 1  Assessment: lips, teeth, and gum same as pre-op and atraumatic intubation

## 2024-08-22 NOTE — ANESTHESIA PREPROCEDURE EVALUATION
Anesthesia Evaluation     Patient summary reviewed and Nursing notes reviewed   no history of anesthetic complications:   NPO Solid Status: > 8 hours  NPO Liquid Status: > 8 hours           Airway   Mallampati: II  TM distance: >3 FB  Neck ROM: full  No difficulty expected  Dental - normal exam     Pulmonary - normal exam    breath sounds clear to auscultation  (+) a smoker Current, Smoked day of surgery, cigarettes, COPD,  Cardiovascular - normal exam    Rhythm: regular  Rate: normal    (+) hypertension      Neuro/Psych  (+) seizures, psychiatric history Bipolar  GI/Hepatic/Renal/Endo - negative ROS     Musculoskeletal     (+) back pain  Abdominal  - normal exam   Substance History - negative use     OB/GYN negative ob/gyn ROS         Other   arthritis,   history of cancer active                Anesthesia Plan    ASA 2     general     intravenous induction     Anesthetic plan, risks, benefits, and alternatives have been provided, discussed and informed consent has been obtained with: patient.  Pre-procedure education provided  Plan discussed with CRNA.    CODE STATUS:

## 2024-08-22 NOTE — ANESTHESIA POSTPROCEDURE EVALUATION
Patient: Anna Moore    Procedure Summary       Date: 08/22/24 Room / Location: [facility] OR  /  [facility] OR    Anesthesia Start: 0808 Anesthesia Stop: 0838    Procedure: INSERTION OF PORTACATH (Left) Diagnosis:       Cervical cancer, FIGO stage IIIB      (Cervical cancer, FIGO stage IIIB [C53.9])    Surgeons: Shaji Bryant MD Provider: Bi Garcia MD    Anesthesia Type: general ASA Status: 2            Anesthesia Type: general    Vitals  Vitals Value Taken Time   /96 08/22/24 0910   Temp 97.6 °F (36.4 °C) 08/22/24 0840   Pulse 91 08/22/24 0911   Resp 15 08/22/24 0910   SpO2 92 % 08/22/24 0911   Vitals shown include unfiled device data.        Post Anesthesia Care and Evaluation    Patient location during evaluation: PHASE II  Patient participation: complete - patient participated  Level of consciousness: awake and alert  Pain score: 1  Pain management: adequate    Airway patency: patent  Anesthetic complications: No anesthetic complications  PONV Status: controlled  Cardiovascular status: acceptable  Respiratory status: acceptable  Hydration status: acceptable

## 2024-08-22 NOTE — OP NOTE
INSERTION OF PORTACATH  Procedure Note    Anna Moore  8/22/2024    Pre-op Diagnosis:   Cervical cancer, FIGO stage IIIB [C53.9]    Post-op Diagnosis: same        Procedure(s):  INSERTION OF PORTACATH    Surgeon(s):  Shaji Bryant MD    Anesthesia: Anesthesia type not filed in the log.    Staff:   Circulator: Jennie Read RN  Radiology Technologist: Yemi Barriga, RT  Scrub Person: Mehrdad Gutierrez Nicholas    Estimated Blood Loss: minimal    Specimens:                * No orders in the log *      Drains:   [REMOVED] Urethral Catheter Silicone 20 Fr. (Removed)       Procedure: The upper chest and neck was prepped and draped. The left clavicle area was injected with local. The subclavian vein was accessed and wire threaded in. The pocket was made with the cautery and the port placed. The catheter was trimmed and the dilator and sheath placed. The dilator was removed and the catheter placed. The port had good blood return and flushed easily. The c arm was used to  check the catheter. The port was sutured in with vicryl and the wound closed with vicryl.     Findings:              Complications: none   Grafts / Implants N/A    Shaji Bryant MD     Date: 8/22/2024  Time: 08:35 EDT

## 2024-08-28 ENCOUNTER — TELEPHONE (OUTPATIENT)
Dept: ONCOLOGY | Facility: CLINIC | Age: 54
End: 2024-08-28
Payer: COMMERCIAL

## 2024-08-28 NOTE — TELEPHONE ENCOUNTER
Caller: DEIDRE    Relationship:     Best call back number: [phone]    Who are you requesting to speak with (clinical staff, provider,  specific staff member): CLINICAL      What was the call regarding: PT HAS CT ON 8/29 AND DEIDRE ADVISES AUTH HAS NOT BEEN RECEIVED YET  PLEASE SEND OVER BEFORE 3PM.

## 2024-09-04 ENCOUNTER — TELEPHONE (OUTPATIENT)
Dept: ONCOLOGY | Facility: CLINIC | Age: 54
End: 2024-09-04
Payer: COMMERCIAL

## 2024-09-04 ENCOUNTER — DOCUMENTATION (OUTPATIENT)
Dept: ONCOLOGY | Facility: CLINIC | Age: 54
End: 2024-09-04
Payer: COMMERCIAL

## 2024-09-04 NOTE — TELEPHONE ENCOUNTER
ATTEMPTED TO CALL PT AND PT'S SON EARLENE LOW TO SCHEDULE HER FOR CHEMO EDUCATION. ALL TELEPHONE NUMBERS LISTED FOR PT RECEIVE NO ANSWERS AND UNABLE TO LVM. WILL CONTINUE TO TRY CALLING.

## 2024-09-05 ENCOUNTER — DOCUMENTATION (OUTPATIENT)
Dept: ONCOLOGY | Facility: CLINIC | Age: 54
End: 2024-09-05
Payer: COMMERCIAL

## 2024-09-05 NOTE — PROGRESS NOTES
Spoke with patient this date to make aware that check for [facility] and [place] [facility] can not be put in anyone else's name.    Patient to contact social security office for appointment to apply for new social security card.    Patient to contact  with any further questions or needs.    SS will follow.

## 2024-09-05 NOTE — TELEPHONE ENCOUNTER
Attempted to call PT again this morning to arrange chemo education. Unable to LVM as PT's phone does not have VM setup and son Haja VM is full.

## 2024-09-11 ENCOUNTER — TELEPHONE (OUTPATIENT)
Dept: RADIATION ONCOLOGY | Facility: HOSPITAL | Age: 54
End: 2024-09-11
Payer: COMMERCIAL

## 2024-09-11 DIAGNOSIS — C53.9 CERVICAL CANCER, FIGO STAGE IIIB: Primary | ICD-10-CM

## 2024-09-11 RX ORDER — PALONOSETRON 0.05 MG/ML
0.25 INJECTION, SOLUTION INTRAVENOUS ONCE
OUTPATIENT
Start: 2024-09-16

## 2024-09-11 RX ORDER — SODIUM CHLORIDE 9 MG/ML
20 INJECTION, SOLUTION INTRAVENOUS ONCE
OUTPATIENT
Start: 2024-09-11

## 2024-09-11 RX ORDER — SODIUM CHLORIDE 9 MG/ML
20 INJECTION, SOLUTION INTRAVENOUS ONCE
OUTPATIENT
Start: 2024-09-23

## 2024-09-11 RX ORDER — PALONOSETRON 0.05 MG/ML
0.25 INJECTION, SOLUTION INTRAVENOUS ONCE
OUTPATIENT
Start: 2024-09-11

## 2024-09-11 RX ORDER — SODIUM CHLORIDE 9 MG/ML
20 INJECTION, SOLUTION INTRAVENOUS ONCE
OUTPATIENT
Start: 2024-09-16

## 2024-09-11 RX ORDER — PALONOSETRON 0.05 MG/ML
0.25 INJECTION, SOLUTION INTRAVENOUS ONCE
OUTPATIENT
Start: 2024-09-23

## 2024-09-11 NOTE — TELEPHONE ENCOUNTER
MA spoke with patient and offered a follow-up appointment 9/12/24 at 12:30 Pm, she accepted. No questions or concerns at this time.

## 2024-09-12 ENCOUNTER — OFFICE VISIT (OUTPATIENT)
Dept: ONCOLOGY | Facility: CLINIC | Age: 54
End: 2024-09-12
Payer: COMMERCIAL

## 2024-09-12 ENCOUNTER — TELEPHONE (OUTPATIENT)
Dept: RADIATION ONCOLOGY | Facility: HOSPITAL | Age: 54
End: 2024-09-12
Payer: COMMERCIAL

## 2024-09-12 VITALS
TEMPERATURE: 97.6 F | HEART RATE: 114 BPM | OXYGEN SATURATION: 97 % | SYSTOLIC BLOOD PRESSURE: 125 MMHG | BODY MASS INDEX: 25.98 KG/M2 | WEIGHT: 146.6 LBS | DIASTOLIC BLOOD PRESSURE: 83 MMHG | RESPIRATION RATE: 18 BRPM | HEIGHT: 63 IN

## 2024-09-12 DIAGNOSIS — C53.9 MALIGNANT NEOPLASM OF CERVIX, UNSPECIFIED SITE: ICD-10-CM

## 2024-09-12 DIAGNOSIS — S81.801A WOUND OF RIGHT LOWER EXTREMITY, INITIAL ENCOUNTER: ICD-10-CM

## 2024-09-12 DIAGNOSIS — C53.9 CERVICAL CANCER, FIGO STAGE IIIB: Primary | ICD-10-CM

## 2024-09-12 RX ORDER — GABAPENTIN 300 MG/1
CAPSULE ORAL
Qty: 90 CAPSULE | Refills: 0 | Status: SHIPPED | OUTPATIENT
Start: 2024-09-12

## 2024-09-12 RX ORDER — ONDANSETRON 8 MG/1
8 TABLET, FILM COATED ORAL 3 TIMES DAILY PRN
Qty: 30 TABLET | Refills: 5 | Status: SHIPPED | OUTPATIENT
Start: 2024-09-12

## 2024-09-12 RX ORDER — CLINDAMYCIN HCL 300 MG
300 CAPSULE ORAL 2 TIMES DAILY
Qty: 20 CAPSULE | Refills: 0 | Status: SHIPPED | OUTPATIENT
Start: 2024-09-12

## 2024-09-12 RX ORDER — LIDOCAINE/PRILOCAINE 2.5 %-2.5%
CREAM (GRAM) TOPICAL
Qty: 30 G | Refills: 1 | Status: SHIPPED | OUTPATIENT
Start: 2024-09-12

## 2024-09-12 RX ORDER — PROCHLORPERAZINE MALEATE 10 MG
10 TABLET ORAL EVERY 6 HOURS PRN
Qty: 30 TABLET | Refills: 1 | Status: SHIPPED | OUTPATIENT
Start: 2024-09-12

## 2024-09-12 NOTE — TELEPHONE ENCOUNTER
RN spoke with patient to educate her how important it is for her to have CT scans performed prior to treatment. Patient voiced she could not stay for scans today as it hurt her back too bad. Patient asked if Dr Uriarte could increase gabapentin dose for her to take before her CT scans so it would be less painful on her back. RN discussed with MD who is agreeable; however, the patient needs to show up and those scans performed.     When RN called patient back to inform her of this, patient's son was not with patient at this time but stated he would relay this information to her. RN, then, reiterated that it is imperative that the patient has these scans completed so we can get her started with chemo and radiation. He voiced understanding and is participating in patient's care.

## 2024-09-12 NOTE — TELEPHONE ENCOUNTER
Patient arrived for follow-up accompanied by her son. Upon arrival, it was determined she had not completed her CT scans which were scheduled for this morning. MA spoke with provider and relayed information to patient that it was best to complete the scans prior to proceeding with a follow-up. Scans are needed for staging and planning purposes. Medical Oncology is aware of no show of scans this morning and are currently working to  rescheduled them for patient.

## 2024-09-12 NOTE — PROGRESS NOTES
CHEMOTHERAPY PREPARATION    Anna Moore  [id number]  1970    Chief Complaint: Chemotherapy Education    History of present illness:  Anna Moore is a 54 y.o. year old female who is here today for chemotherapy preparation and needs assessment. The patient has been diagnosed with cervical cancer and is planning to begin treatment with Cisplatin/Pembrolizumab q3 weeks. She was scheduled to have CT imaging performed this AM but states that she cancelled appointment due to back pain, stating that she does not think she could lay flat for imaging. She is requesting rx for antibiotics due to wound of right posterior leg and erythema of lower extremities. She is otherwise without specific complaints.    Oncology History:    Oncology/Hematology History   Cervical cancer, FIGO stage IIIB   5/27/2024 Initial Diagnosis    Cervical cancer, FIGO stage IIIB     8/19/2024 -  Chemotherapy    OP CERVICAL Pembrolizumab / CISplatin + XRT     8/19/2024 Cancer Staged    Staging form: ONC CERVIX UTERI AJCC V9  - Clinical stage from 8/19/2024: FIGO Stage IIIB (cT3b, cNX, cM0) - Signed by Ankita Uriarte MD on 8/19/2024       Past Medical History:   Diagnosis Date    Abdominal pain     Abnormal vaginal bleeding     Anxiety     Arthritis     Back pain     Bilateral swelling of feet     Cancer 2024    bladder    Cervical cancer     COPD (chronic obstructive pulmonary disease)     Hypertension     Joint pain     Lymphedema     laura legs    Muscle pain     Psychosis     Seizures     last seizure x2 year ago    Substance abuse      Past Surgical History:   Procedure Laterality Date    ANKLE SURGERY      APPENDECTOMY      CLAVICLE SURGERY Right     PORTACATH PLACEMENT Left 8/22/2024    Procedure: INSERTION OF PORTACATH;  Surgeon: Shaji Bryant MD;  Location: [facility];  Service: General;  Laterality: Left;    TONSILLECTOMY      TRANSURETHRAL RESECTION OF BLADDER TUMOR N/A 5/20/2024    Procedure: CYSTOSCOPY TRANSURETHRAL RESECTION OF  BLADDER TUMOR;  Surgeon: Ron Alves MD;  Location: [facility];  Service: Urology;  Laterality: N/A;     Family History   Problem Relation Age of Onset    Heart disease Father     Cancer Mother     Lung cancer Mother        Medications: The current medication list was reviewed and reconciled.     Allergies:  is allergic to codeine.    Review of Systems:  A comprehensive 14 point review of systems was conducted with patient and positive as per HPI otherwise negative.    Physical Exam:    Vitals:    09/12/24 1138   BP: 125/83   Pulse: 114   Resp: 18   Temp: 97.6 °F (36.4 °C)   SpO2: 97%     Vitals:    09/12/24 1138   PainSc:   9   PainLoc: Back        ECOG: (1) Restricted in Physically Strenuous Activity, Ambulatory & Able to Do Work of Light Nature    General/Constitutional: Awake, alert and oriented. No apparent acute distress is noted.  HEENT: Normocephalic, atraumatic. PERRLA, conjunctiva normal. Extraocular movements are intact.  Neck: No JVD, thyromegaly or cervical lymphadenopathy.  Cardiovascular: S1, S2. Regular rate and rhythm, no murmurs, rubs or gallops.  Respiratory: Pulmonary effort is normal, lungs are clear to auscultation bilaterally. No wheezing, rhonchi or rales. No use of accessory muscles, no retractions.  Abdomen: Soft, non-tender, non-distended with normoactive bowel sounds x 4 quadrants. No palpable hepatosplenomegaly.  Lymph: No cervical, supraclavicular, axillary, inguinal or femoral adenopathy.  Integumentary: Skin warm and dry. No bruising, ecchymosis.  Extremities: No clubbing, cyanosis or edema.  Neurological: Alert and oriented x 3. Grossly non-focal examination.            NEEDS ASSESSMENTS    Genetics  The patient's new diagnosis and family history have been reviewed for genetic counseling needs. A genetic referral is not recommended.     Barriers to care  A barriers form was also completed by the patient today. We discussed services offered by our facility to help her have  "adequate access to care. The patient was given the name and card for our Oncology Social Worker, Sheba Argueta. Based upon barriers assessment today, the patient will not require a follow-up call from the  to further discuss needs.     VAD Assessment  The patient and I discussed planned intervenous chemotherapy as well as other IV treatments that are often needed throughout the course of treatment. These may include, but are not limited to blood transfusions, antibiotics, and IV hydration. The vasculature does not appear to be adequate for multiple peripheral IVs throughout their treatment course. Discussed risks and benefits of VADs. The patient would like to pursue Port-A-Cath insertion prior to initiation of treatment.     Advanced Care Planning  The patient and I discussed advanced care planning, \"Conversations that Matter\".   This service was offered, free of charge, for development of advance directives with a certified ACP facilitator.  The patient does not have an up-to-date advanced directive. This document is not on file with our office. The patient is not interested in an appointment with one of our facilitators to create or update their advanced directives.      Palliative Care  The patient and I discussed palliative care services. Palliative care is not the same as Hospice care. This is specialized medical care for people living with serious illness with the goal of improving quality of life for the patient and their family. [facility] has partnered with [facility] to offer our patients outpatient palliative care early along with their treatment to assist in coordination of care, symptom management, pain management, and medical decision making.  Oncology criteria for palliative care referral is not met at this time. The patient is not interested in a palliative care consultation.     Additional Referral needs  none      CHEMOTHERAPY EDUCATION    Booklets Given: Chemotherapy and You " [x]  Eating Hints [x]    Sexuality/Fertility Books []      Chemotherapy/Biotherapy Education Sheets: (list all that apply)  nausea management, acid reflux management, diarrhea management, Cancer resourse contacts information, skin and mouth care, and vaccination information                                                                                                                                                                 Chemotherapy Regimen:   Treatment Plans       Name Type Plan Dates Plan Provider         Active    OP CERVICAL Pembrolizumab / CISplatin + XRT ONCOLOGY TREATMENT  8/18/2024 - Present Ankita Uriarte MD                      TOPICS EDUCATION PROVIDED COMMENTS   ANEMIA:  role of RBC, cause, s/s, ways to manage, role of transfusion [x]    THROMBOCYTOPENIA:  role of platelet, cause, s/s, ways to prevent bleeding, things to avoid, when to seek help [x]    NEUTROPENIA:  role of WBC, cause, infection precautions, s/s of infection, when to call MD [x]    NUTRITION & APPETITE CHANGES:  importance of maintaining healthy diet & weight, ways to manage to improve intake, dietary consult, exercise regimen [x]    DIARRHEA:  causes, s/s of dehydration, ways to manage, dietary changes, when to call MD [x]    CONSTIPATION:  causes, ways to manage, dietary changes, when to call MD [x]    NAUSEA & VOMITING:  cause, use of antiemetics, dietary changes, when to call MD [x]    MOUTH SORES:  causes, oral care, ways to manage [x]    ALOPECIA:  cause, ways to manage, resources [x]    INFERTILITY & SEXUALITY:  causes, fertility preservation options, sexuality changes, ways to manage, importance of birth control [x]    NERVOUS SYSTEM CHANGES:  causes, s/s, neuropathies, cognitive changes, ways to manage [x]    PAIN:  causes, ways to manage [x]    SKIN & NAIL CHANGES:  cause, s/s, ways to manage [x]    ORGAN TOXICITIES:  cause, s/s, need for diagnostic tests, labs, when to notify MD [x]    SURVIVORSHIP:  distress,  distress assessment, secondary malignancies, early/late effects, follow-up, social issues, social support [x]    HOME CARE:  use of spill kits, storing of PO chemo, how to manage bodily fluids [x]    MISCELLANEOUS:  drug interactions, administration, vesicant, et [x]      Assessment and Plan:    Diagnoses and all orders for this visit:    1. Cervical cancer, FIGO stage IIIB (Primary)  -     ondansetron (ZOFRAN) 8 MG tablet; Take 1 tablet by mouth 3 (Three) Times a Day As Needed for Nausea or Vomiting.  Dispense: 30 tablet; Refill: 5  -     Ambulatory Referral to Wound Clinic    2. Wound of right lower extremity, initial encounter  -     Ambulatory Referral to Wound Clinic    Other orders  -     prochlorperazine (COMPAZINE) 10 MG tablet; Take 1 tablet by mouth Every 6 (Six) Hours As Needed for Nausea or Vomiting.  Dispense: 30 tablet; Refill: 1  -     lidocaine-prilocaine (EMLA) 2.5-2.5 % cream; Apply to port-a-cath site 30 minutes prior to arrival at infusion center. Cover with plastic wrap.  Dispense: 30 g; Refill: 1  -     clindamycin (Cleocin) 300 MG capsule; Take 1 capsule by mouth 2 (Two) Times a Day.  Dispense: 20 capsule; Refill: 0      - Chemotherapy teaching was also completed today as documented above. Adequate time was given to answer all questions to her satisfaction. Patient and family are aware of their care team members and contact information if they have questions or problems throughout the treatment course. Needs assessments and education has been completed. The patient is adequately prepared to begin treatment as scheduled.   - Reviewed with patient education regarding EMLA cream, Compazine, and Zofran and prescriptions were sent to the pharmacy of patient's choice.  - I advised the patient that she can take Tylenol or Ibuprofen as needed for aches/pains related to cancer/treatment. I also advised patient that she could use Senakot or Miralax as needed for constipation or Imodium as needed for  diarrhea.    - I reviewed with the patient the care team members. I also reviewed the option of the urgent care clinic through our oncology office for evaluation and management of symptoms related to treatment. Patient was provided with phone number to call during regular office hours [phone] press #1 and for treatment related questions call [phone] to speak to a nurse. If after hours or on the weekend call [phone] and ask to page the MD on call for [facility].   - Consent for treatment with Cisplatin/Pembrolizumab as recommended by Dr. Uriarte for the treatment of cervical cancer was signed by the patient today.     - Will give rx for clindamycin 300 mg BID x 10 days and refer to wound care clinic for further evaluation of right posterior leg wound; previously took Keflex without improvement. She states that wounds continue to ooze and seem to be worse.        I spent 60 minutes with Anna Moore today.  In the office today, more than 50% of this time was spent face-to-face with her  in counseling / coordination of care, reviewing her interim medical history and counseling on the current treatment plan.  All questions were answered to her satisfaction.           Electronically Signed by: SANDHYA Corcoran

## 2024-10-12 ENCOUNTER — HOSPITAL ENCOUNTER (EMERGENCY)
Facility: HOSPITAL | Age: 54
Discharge: OTHER INSTITUTION | End: 2024-10-12
Attending: STUDENT IN AN ORGANIZED HEALTH CARE EDUCATION/TRAINING PROGRAM
Payer: COMMERCIAL

## 2024-10-12 VITALS
RESPIRATION RATE: 14 BRPM | OXYGEN SATURATION: 100 % | TEMPERATURE: 99.4 F | HEIGHT: 63 IN | DIASTOLIC BLOOD PRESSURE: 67 MMHG | SYSTOLIC BLOOD PRESSURE: 133 MMHG | BODY MASS INDEX: 25.98 KG/M2 | HEART RATE: 104 BPM | WEIGHT: 146.61 LBS

## 2024-10-12 DIAGNOSIS — T83.022A NEPHROSTOMY TUBE DISPLACED: Primary | ICD-10-CM

## 2024-10-12 LAB
ALBUMIN SERPL-MCNC: 3.3 G/DL (ref 3.5–5.2)
ALBUMIN/GLOB SERPL: 0.8 G/DL
ALP SERPL-CCNC: 89 U/L (ref 39–117)
ALT SERPL W P-5'-P-CCNC: 28 U/L (ref 1–33)
ANION GAP SERPL CALCULATED.3IONS-SCNC: 15.3 MMOL/L (ref 5–15)
AST SERPL-CCNC: 38 U/L (ref 1–32)
BACTERIA UR QL AUTO: ABNORMAL /HPF
BASOPHILS # BLD AUTO: 0.05 10*3/MM3 (ref 0–0.2)
BASOPHILS NFR BLD AUTO: 0.3 % (ref 0–1.5)
BILIRUB SERPL-MCNC: 0.3 MG/DL (ref 0–1.2)
BILIRUB UR QL STRIP: NEGATIVE
BUN SERPL-MCNC: 23 MG/DL (ref 6–20)
BUN/CREAT SERPL: 15 (ref 7–25)
CALCIUM SPEC-SCNC: 9.2 MG/DL (ref 8.6–10.5)
CHLORIDE SERPL-SCNC: 90 MMOL/L (ref 98–107)
CLARITY UR: ABNORMAL
CO2 SERPL-SCNC: 26.7 MMOL/L (ref 22–29)
COLOR UR: YELLOW
CREAT SERPL-MCNC: 1.53 MG/DL (ref 0.57–1)
CRP SERPL-MCNC: 11.82 MG/DL (ref 0–0.5)
D-LACTATE SERPL-SCNC: 1 MMOL/L (ref 0.5–2)
DEPRECATED RDW RBC AUTO: 49.6 FL (ref 37–54)
EGFRCR SERPLBLD CKD-EPI 2021: 40.5 ML/MIN/1.73
EOSINOPHIL # BLD AUTO: 0.08 10*3/MM3 (ref 0–0.4)
EOSINOPHIL NFR BLD AUTO: 0.4 % (ref 0.3–6.2)
ERYTHROCYTE [DISTWIDTH] IN BLOOD BY AUTOMATED COUNT: 14.8 % (ref 12.3–15.4)
ERYTHROCYTE [SEDIMENTATION RATE] IN BLOOD: 123 MM/HR (ref 0–30)
GLOBULIN UR ELPH-MCNC: 4.3 GM/DL
GLUCOSE SERPL-MCNC: 116 MG/DL (ref 65–99)
GLUCOSE UR STRIP-MCNC: NEGATIVE MG/DL
HCT VFR BLD AUTO: 32.4 % (ref 34–46.6)
HGB BLD-MCNC: 10.1 G/DL (ref 12–15.9)
HGB UR QL STRIP.AUTO: ABNORMAL
HOLD SPECIMEN: NORMAL
HOLD SPECIMEN: NORMAL
HYALINE CASTS UR QL AUTO: ABNORMAL /LPF
IMM GRANULOCYTES # BLD AUTO: 0.16 10*3/MM3 (ref 0–0.05)
IMM GRANULOCYTES NFR BLD AUTO: 0.8 % (ref 0–0.5)
KETONES UR QL STRIP: NEGATIVE
LEUKOCYTE ESTERASE UR QL STRIP.AUTO: ABNORMAL
LYMPHOCYTES # BLD AUTO: 1.23 10*3/MM3 (ref 0.7–3.1)
LYMPHOCYTES NFR BLD AUTO: 6.4 % (ref 19.6–45.3)
MCH RBC QN AUTO: 28.3 PG (ref 26.6–33)
MCHC RBC AUTO-ENTMCNC: 31.2 G/DL (ref 31.5–35.7)
MCV RBC AUTO: 90.8 FL (ref 79–97)
MONOCYTES # BLD AUTO: 2.37 10*3/MM3 (ref 0.1–0.9)
MONOCYTES NFR BLD AUTO: 12.4 % (ref 5–12)
NEUTROPHILS NFR BLD AUTO: 15.29 10*3/MM3 (ref 1.7–7)
NEUTROPHILS NFR BLD AUTO: 79.7 % (ref 42.7–76)
NITRITE UR QL STRIP: NEGATIVE
NRBC BLD AUTO-RTO: 0 /100 WBC (ref 0–0.2)
PH UR STRIP.AUTO: 7.5 [PH] (ref 5–8)
PLATELET # BLD AUTO: 360 10*3/MM3 (ref 140–450)
PMV BLD AUTO: 9.4 FL (ref 6–12)
POTASSIUM SERPL-SCNC: 3.6 MMOL/L (ref 3.5–5.2)
PROT SERPL-MCNC: 7.6 G/DL (ref 6–8.5)
PROT UR QL STRIP: ABNORMAL
RBC # BLD AUTO: 3.57 10*6/MM3 (ref 3.77–5.28)
RBC # UR STRIP: ABNORMAL /HPF
REF LAB TEST METHOD: ABNORMAL
SODIUM SERPL-SCNC: 132 MMOL/L (ref 136–145)
SP GR UR STRIP: 1.02 (ref 1–1.03)
SQUAMOUS #/AREA URNS HPF: ABNORMAL /HPF
UROBILINOGEN UR QL STRIP: ABNORMAL
WBC # UR STRIP: ABNORMAL /HPF
WBC NRBC COR # BLD AUTO: 19.18 10*3/MM3 (ref 3.4–10.8)
WHOLE BLOOD HOLD COAG: NORMAL
WHOLE BLOOD HOLD SPECIMEN: NORMAL
YEAST URNS QL MICRO: ABNORMAL /HPF

## 2024-10-12 PROCEDURE — 96365 THER/PROPH/DIAG IV INF INIT: CPT

## 2024-10-12 PROCEDURE — 85652 RBC SED RATE AUTOMATED: CPT | Performed by: PHYSICIAN ASSISTANT

## 2024-10-12 PROCEDURE — 87186 SC STD MICRODIL/AGAR DIL: CPT | Performed by: PHYSICIAN ASSISTANT

## 2024-10-12 PROCEDURE — 99285 EMERGENCY DEPT VISIT HI MDM: CPT

## 2024-10-12 PROCEDURE — 87077 CULTURE AEROBIC IDENTIFY: CPT | Performed by: PHYSICIAN ASSISTANT

## 2024-10-12 PROCEDURE — 80053 COMPREHEN METABOLIC PANEL: CPT | Performed by: PHYSICIAN ASSISTANT

## 2024-10-12 PROCEDURE — 85025 COMPLETE CBC W/AUTO DIFF WBC: CPT | Performed by: PHYSICIAN ASSISTANT

## 2024-10-12 PROCEDURE — 25010000002 CEFTRIAXONE PER 250 MG: Performed by: PHYSICIAN ASSISTANT

## 2024-10-12 PROCEDURE — 87147 CULTURE TYPE IMMUNOLOGIC: CPT | Performed by: PHYSICIAN ASSISTANT

## 2024-10-12 PROCEDURE — 87086 URINE CULTURE/COLONY COUNT: CPT | Performed by: PHYSICIAN ASSISTANT

## 2024-10-12 PROCEDURE — 86140 C-REACTIVE PROTEIN: CPT | Performed by: PHYSICIAN ASSISTANT

## 2024-10-12 PROCEDURE — 87040 BLOOD CULTURE FOR BACTERIA: CPT | Performed by: PHYSICIAN ASSISTANT

## 2024-10-12 PROCEDURE — 81001 URINALYSIS AUTO W/SCOPE: CPT | Performed by: PHYSICIAN ASSISTANT

## 2024-10-12 PROCEDURE — 87154 CUL TYP ID BLD PTHGN 6+ TRGT: CPT | Performed by: PHYSICIAN ASSISTANT

## 2024-10-12 PROCEDURE — 83605 ASSAY OF LACTIC ACID: CPT | Performed by: PHYSICIAN ASSISTANT

## 2024-10-12 PROCEDURE — 36415 COLL VENOUS BLD VENIPUNCTURE: CPT

## 2024-10-12 RX ORDER — LORAZEPAM 0.5 MG/1
0.5 TABLET ORAL EVERY 6 HOURS PRN
COMMUNITY

## 2024-10-12 RX ORDER — HYDROXYZINE HYDROCHLORIDE 50 MG/1
50 TABLET, FILM COATED ORAL EVERY 6 HOURS PRN
COMMUNITY

## 2024-10-12 RX ORDER — AMLODIPINE BESYLATE 10 MG/1
10 TABLET ORAL DAILY
COMMUNITY

## 2024-10-12 RX ORDER — DULOXETIN HYDROCHLORIDE 30 MG/1
30 CAPSULE, DELAYED RELEASE ORAL NIGHTLY
COMMUNITY

## 2024-10-12 RX ORDER — FENTANYL 25 UG/1
1 PATCH TRANSDERMAL
Status: DISCONTINUED | OUTPATIENT
Start: 2024-10-12 | End: 2024-10-13 | Stop reason: HOSPADM

## 2024-10-12 RX ORDER — SENNOSIDES A AND B 8.6 MG/1
1 TABLET, FILM COATED ORAL NIGHTLY
COMMUNITY

## 2024-10-12 RX ORDER — FENTANYL 25 UG/1
1 PATCH TRANSDERMAL
COMMUNITY

## 2024-10-12 RX ORDER — DEXAMETHASONE 4 MG/1
4 TABLET ORAL
COMMUNITY

## 2024-10-12 RX ADMIN — FENTANYL 1 PATCH: 25 PATCH TRANSDERMAL at 19:40

## 2024-10-12 RX ADMIN — SODIUM CHLORIDE 2000 MG: 9 INJECTION, SOLUTION INTRAVENOUS at 12:10

## 2024-10-12 NOTE — ED NOTES
[facility] unable to take transfer due festival going on in area and commitments to surrounding hospital

## 2024-10-12 NOTE — ED NOTES
Called House supervisor, Keenan, about our truck taking pt to , was told that they had just made pt contact with a nursing home return and to see if [facility] ems or [facility] ems would take pt in the meantime. Rn made aware of delay

## 2024-10-12 NOTE — ED PROVIDER NOTES
Subjective   History of Present Illness  54-year-old female presents secondary to cutaneous nephrostomy tube being displaced on the left side.  These were placed on 9/26 according to notes.  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  She previously had hydronephrosis and renal failure.  She states that after the tubes were placed that she has had a marked decrease in terms of fluid tension.  She also has a past medical history of COPD, hypertension, seizures.  She presents by ambulance.      Review of Systems   Constitutional: Negative.  Negative for fever.   HENT: Negative.     Respiratory: Negative.     Cardiovascular: Negative.  Negative for chest pain.   Gastrointestinal: Negative.  Negative for abdominal pain.   Endocrine: Negative.    Genitourinary: Negative.  Negative for dysuria.   Skin: Negative.    Neurological: Negative.    Psychiatric/Behavioral: Negative.     All other systems reviewed and are negative.      Past Medical History:   Diagnosis Date    Abdominal pain     Abnormal vaginal bleeding     Anxiety     Arthritis     Back pain     Bilateral swelling of feet     Cancer 2024    bladder    Cervical cancer     COPD (chronic obstructive pulmonary disease)     Hypertension     Joint pain     Lymphedema     laura legs    Muscle pain     Psychosis     Seizures     last seizure x2 year ago    Substance abuse        Allergies   Allergen Reactions    Codeine Itching and GI Intolerance       Past Surgical History:   Procedure Laterality Date    ANKLE SURGERY      APPENDECTOMY      CLAVICLE SURGERY Right     PORTACATH PLACEMENT Left 8/22/2024    Procedure: INSERTION OF PORTACATH;  Surgeon: Shaji Bryant MD;  Location: [facility];  Service: General;  Laterality: Left;    TONSILLECTOMY      TRANSURETHRAL RESECTION OF BLADDER TUMOR N/A 5/20/2024    Procedure: CYSTOSCOPY TRANSURETHRAL RESECTION OF BLADDER TUMOR;  Surgeon: Ron Alves MD;   Location: [facility];  Service: Urology;  Laterality: N/A;       Family History   Problem Relation Age of Onset    Heart disease Father     Cancer Mother     Lung cancer Mother        Social History     Socioeconomic History    Marital status:    Tobacco Use    Smoking status: Every Day     Current packs/day: 1.50     Types: Cigarettes     Passive exposure: Current    Smokeless tobacco: Never   Vaping Use    Vaping status: Never Used   Substance and Sexual Activity    Alcohol use: Not Currently     Comment: denies at this time.    Drug use: Defer     Types: Other, Marijuana     Comment: DENIES drug use but on suboxone. States she used xanax    Sexual activity: Defer     Partners: Male           Objective   Physical Exam  Vitals and nursing note reviewed.   Constitutional:       General: She is not in acute distress.     Appearance: She is well-developed. She is not diaphoretic.   HENT:      Head: Normocephalic and atraumatic.      Right Ear: External ear normal.      Left Ear: External ear normal.      Nose: Nose normal.   Eyes:      Conjunctiva/sclera: Conjunctivae normal.      Pupils: Pupils are equal, round, and reactive to light.   Neck:      Vascular: No JVD.      Trachea: No tracheal deviation.   Cardiovascular:      Rate and Rhythm: Normal rate and regular rhythm.      Heart sounds: Normal heart sounds. No murmur heard.  Pulmonary:      Effort: Pulmonary effort is normal. No respiratory distress.      Breath sounds: Normal breath sounds. No wheezing.   Abdominal:      General: Bowel sounds are normal.      Palpations: Abdomen is soft.      Tenderness: There is no abdominal tenderness.   Musculoskeletal:         General: No deformity. Normal range of motion.      Cervical back: Normal range of motion and neck supple.      Comments: Left percutaneous nephrostomy tube is displaced.   Skin:     General: Skin is warm and dry.      Coloration: Skin is not pale.      Findings: No erythema or rash.    Neurological:      Mental Status: She is alert and oriented to person, place, and time.      Cranial Nerves: No cranial nerve deficit.   Psychiatric:         Behavior: Behavior normal.         Thought Content: Thought content normal.         Procedures           ED Course  ED Course as of 10/12/24 1353   Sat Oct 12, 2024   1302 Accepted by Dr. Diaz at . [JI]      ED Course User Index  [JI] Edvin Vidal PA                                 Results for orders placed or performed during the hospital encounter of 10/12/24   Comprehensive Metabolic Panel    Collection Time: 10/12/24 11:04 AM    Specimen: Blood   Result Value Ref Range    Glucose 116 (H) 65 - 99 mg/dL    BUN 23 (H) 6 - 20 mg/dL    Creatinine 1.53 (H) 0.57 - 1.00 mg/dL    Sodium 132 (L) 136 - 145 mmol/L    Potassium 3.6 3.5 - 5.2 mmol/L    Chloride 90 (L) 98 - 107 mmol/L    CO2 26.7 22.0 - 29.0 mmol/L    Calcium 9.2 8.6 - 10.5 mg/dL    Total Protein 7.6 6.0 - 8.5 g/dL    Albumin 3.3 (L) 3.5 - 5.2 g/dL    ALT (SGPT) 28 1 - 33 U/L    AST (SGOT) 38 (H) 1 - 32 U/L    Alkaline Phosphatase 89 39 - 117 U/L    Total Bilirubin 0.3 0.0 - 1.2 mg/dL    Globulin 4.3 gm/dL    A/G Ratio 0.8 g/dL    BUN/Creatinine Ratio 15.0 7.0 - 25.0    Anion Gap 15.3 (H) 5.0 - 15.0 mmol/L    eGFR 40.5 (L) >60.0 mL/min/1.73   CBC Auto Differential    Collection Time: 10/12/24 11:04 AM    Specimen: Blood   Result Value Ref Range    WBC 19.18 (H) 3.40 - 10.80 10*3/mm3    RBC 3.57 (L) 3.77 - 5.28 10*6/mm3    Hemoglobin 10.1 (L) 12.0 - 15.9 g/dL    Hematocrit 32.4 (L) 34.0 - 46.6 %    MCV 90.8 79.0 - 97.0 fL    MCH 28.3 26.6 - 33.0 pg    MCHC 31.2 (L) 31.5 - 35.7 g/dL    RDW 14.8 12.3 - 15.4 %    RDW-SD 49.6 37.0 - 54.0 fl    MPV 9.4 6.0 - 12.0 fL    Platelets 360 140 - 450 10*3/mm3    Neutrophil % 79.7 (H) 42.7 - 76.0 %    Lymphocyte % 6.4 (L) 19.6 - 45.3 %    Monocyte % 12.4 (H) 5.0 - 12.0 %    Eosinophil % 0.4 0.3 - 6.2 %    Basophil % 0.3 0.0 - 1.5 %    Immature Grans % 0.8 (H)  0.0 - 0.5 %    Neutrophils, Absolute 15.29 (H) 1.70 - 7.00 10*3/mm3    Lymphocytes, Absolute 1.23 0.70 - 3.10 10*3/mm3    Monocytes, Absolute 2.37 (H) 0.10 - 0.90 10*3/mm3    Eosinophils, Absolute 0.08 0.00 - 0.40 10*3/mm3    Basophils, Absolute 0.05 0.00 - 0.20 10*3/mm3    Immature Grans, Absolute 0.16 (H) 0.00 - 0.05 10*3/mm3    nRBC 0.0 0.0 - 0.2 /100 WBC   C-reactive Protein    Collection Time: 10/12/24 11:04 AM    Specimen: Blood   Result Value Ref Range    C-Reactive Protein 11.82 (H) 0.00 - 0.50 mg/dL   Sedimentation Rate    Collection Time: 10/12/24 11:04 AM    Specimen: Blood   Result Value Ref Range    Sed Rate 123 (H) 0 - 30 mm/hr   Green Top (Gel)    Collection Time: 10/12/24 11:04 AM   Result Value Ref Range    Extra Tube Hold for add-ons.    Lavender Top    Collection Time: 10/12/24 11:04 AM   Result Value Ref Range    Extra Tube hold for add-on    Gold Top - SST    Collection Time: 10/12/24 11:04 AM   Result Value Ref Range    Extra Tube Hold for add-ons.    Light Blue Top    Collection Time: 10/12/24 11:04 AM   Result Value Ref Range    Extra Tube Hold for add-ons.    Lactic Acid, Plasma    Collection Time: 10/12/24 11:56 AM    Specimen: Blood   Result Value Ref Range    Lactate 1.0 0.5 - 2.0 mmol/L   Urinalysis With Culture If Indicated - Nephrostomy Right    Collection Time: 10/12/24 12:11 PM    Specimen: Nephrostomy Right; Urine   Result Value Ref Range    Color, UA Yellow Yellow, Straw    Appearance, UA Turbid (A) Clear    pH, UA 7.5 5.0 - 8.0    Specific Gravity, UA 1.016 1.005 - 1.030    Glucose, UA Negative Negative    Ketones, UA Negative Negative    Bilirubin, UA Negative Negative    Blood, UA Moderate (2+) (A) Negative    Protein, UA >=300 mg/dL (3+) (A) Negative    Leuk Esterase, UA Large (3+) (A) Negative    Nitrite, UA Negative Negative    Urobilinogen, UA 0.2 E.U./dL 0.2 - 1.0 E.U./dL   Urinalysis, Microscopic Only - Nephrostomy Right    Collection Time: 10/12/24 12:11 PM    Specimen:  Nephrostomy Right; Urine   Result Value Ref Range    RBC, UA 6-10 (A) None Seen, 0-2 /HPF    WBC, UA 21-50 (A) None Seen, 0-2 /HPF    Bacteria, UA 2+ (A) None Seen /HPF    Squamous Epithelial Cells, UA 3-6 (A) None Seen, 0-2 /HPF    Yeast, UA Small/1+ Budding Yeast (A) None Seen /HPF    Hyaline Casts, UA None Seen None Seen /LPF    Methodology Manual Light Microscopy                  Medical Decision Making  54-year-old female presents secondary to cutaneous nephrostomy tube being displaced on the left side.  These were placed on 9/26 according to notes.  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  She previously had hydronephrosis and renal failure.  She states that after the tubes were placed that she has had a marked decrease in terms of fluid tension.  She also has a past medical history of COPD, hypertension, seizures.  She presents by ambulance.    Problems Addressed:  Nephrostomy tube displaced: complicated acute illness or injury    Amount and/or Complexity of Data Reviewed  Labs: ordered. Decision-making details documented in ED Course.  Discussion of management or test interpretation with external provider(s): Dr. Diaz at [facility] who accepts the patient.        Final diagnoses:   Nephrostomy tube displaced       ED Disposition  ED Disposition       ED Disposition   Transfer to Another Facility     Condition   --    Comment   --               No follow-up provider specified.       Medication List      No changes were made to your prescriptions during this visit.            Edvin Vidal PA  10/12/24 5668

## 2024-10-12 NOTE — ED NOTES
Called [facility] back to check on pt bed status, was told pt will be going to room 6123, RN to call report to [phone]. Ems to call the same number when they are 10 minutes out from facility.

## 2024-10-12 NOTE — ED NOTES
72h fentanyl patch removed from patient's right arm that was placed by patient's son at home (verbal order by JACQUELYN Lees).

## 2024-10-12 NOTE — ED NOTES
Was told by [facility] that a  had to go home, and stated that would drop them down to one truck due to another truck being at a community activity that required them to have a truck on stand by on scene and would make them unable to take the transfer at this time. She also stated that  they were trying to find a replacement, had an unknown eta at this time. RN, lead nurse, as well as house supervisor have been made aware of the delay.

## 2024-10-12 NOTE — ED NOTES
Pt has a 72h Fentanyl patch on pts right arm that was placed by pts son at home and he states it is due to be changed at 1600 this day.

## 2024-10-12 NOTE — ED NOTES
Called  for possible transfer, was told they had a couple of other transfer calls to finish up then they would give us a call back

## 2024-10-14 LAB
BACTERIA BLD CULT: ABNORMAL
BACTERIA SPEC AEROBE CULT: ABNORMAL
BOTTLE TYPE: ABNORMAL

## 2024-10-16 LAB
BACTERIA SPEC AEROBE CULT: ABNORMAL
BACTERIA SPEC AEROBE CULT: ABNORMAL
GRAM STN SPEC: ABNORMAL
ISOLATED FROM: ABNORMAL
ISOLATED FROM: ABNORMAL

## (undated) DEVICE — SYR LL TP 10ML STRL

## (undated) DEVICE — DRAPE,T,LAPARO,TRANS,STERILE: Brand: MEDLINE

## (undated) DEVICE — GLV SURG PREMIERPRO MIC LTX PF SZ8 BRN

## (undated) DEVICE — HYPODERMIC SAFETY NEEDLE: Brand: MONOJECT

## (undated) DEVICE — AMD ANTIMICROBIAL NON-ADHERENT ISLAND DRESSING,0.2% POLYHEXAMETHYLENE BIGUANIDE HCI (PHMB): Brand: TELFA

## (undated) DEVICE — DRAPE,UTILTY,TAPE,15X26, 4EA/PK: Brand: MEDLINE

## (undated) DEVICE — SUT VIC 3/0 SH 27IN J416H

## (undated) DEVICE — EVAC BLDR UROVAC W ADAPT

## (undated) DEVICE — GLV SURG PREMIERPRO MIC LTX PF SZ7.5 BRN

## (undated) DEVICE — DRAINBAG,ANTI-REFLUX TOWER,L/F,2000ML,LL: Brand: MEDLINE

## (undated) DEVICE — COR CYSTO: Brand: MEDLINE INDUSTRIES, INC.

## (undated) DEVICE — PATIENT RETURN ELECTRODE, SINGLE-USE, CONTACT QUALITY MONITORING, ADULT, WITH 9FT CORD, FOR PATIENTS WEIGING OVER 33LBS. (15KG): Brand: MEGADYNE

## (undated) DEVICE — SYRINGE,TOOMEY,IRRIGATION,70CC,STERILE: Brand: MEDLINE

## (undated) DEVICE — Y-TYPE TUR/BLADDER IRRIGATION SET, REGULATING CLAMP

## (undated) DEVICE — SYR LUERLOK 30CC

## (undated) DEVICE — HOLDER: Brand: DEROYAL

## (undated) DEVICE — ANTIBACTERIAL UNDYED BRAIDED (POLYGLACTIN 910), SYNTHETIC ABSORBABLE SUTURE: Brand: COATED VICRYL

## (undated) DEVICE — DRP C/ARM W/BAND W/CLIPS 41X74IN

## (undated) DEVICE — PK BASIC 70

## (undated) DEVICE — HF-RESECTION ELECTRODE PLASMALOOP LOOP, MEDIUM, 24 FR., 12°-30°, ESG TURIS: Brand: OLYMPUS

## (undated) DEVICE — CATHETER,FOLEY,100%SILICONE,20FR,10ML,LF: Brand: MEDLINE